# Patient Record
Sex: FEMALE | Race: BLACK OR AFRICAN AMERICAN | Employment: STUDENT | URBAN - METROPOLITAN AREA
[De-identification: names, ages, dates, MRNs, and addresses within clinical notes are randomized per-mention and may not be internally consistent; named-entity substitution may affect disease eponyms.]

---
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
## 2018-03-12 ENCOUNTER — HOSPITAL ENCOUNTER (EMERGENCY)
Facility: HOSPITAL | Age: 18
Discharge: HOME | End: 2018-03-12
Attending: EMERGENCY MEDICINE
Payer: COMMERCIAL

## 2018-03-12 VITALS
RESPIRATION RATE: 16 BRPM | TEMPERATURE: 99 F | SYSTOLIC BLOOD PRESSURE: 117 MMHG | DIASTOLIC BLOOD PRESSURE: 80 MMHG | OXYGEN SATURATION: 100 % | HEART RATE: 72 BPM

## 2018-03-12 DIAGNOSIS — L30.9 DERMATITIS: ICD-10-CM

## 2018-03-12 DIAGNOSIS — B35.4 RINGWORM OF BODY: Primary | ICD-10-CM

## 2018-03-12 PROCEDURE — 99283 EMERGENCY DEPT VISIT LOW MDM: CPT

## 2018-03-12 RX ORDER — CLOTRIMAZOLE 1 %
1 CREAM (GRAM) TOPICAL 2 TIMES DAILY
Qty: 1 TUBE | Refills: 0 | Status: SHIPPED | OUTPATIENT
Start: 2018-03-12 | End: 2019-07-02

## 2018-03-13 NOTE — ED PROVIDER NOTES
Patient Seen in: [facility] AND [facility] Emergency Department    History   Patient presents with:  Rash Skin Problem (integumentary)    Stated Complaint: rash    HPI    Patient presents with scattered rash she is noted on her arms one on her back as well as on shoulder over the scapula she has an area of what it looks to be ringworm. It is slightly raised flaky circular she had a bandage over it. I remove the bandage.   As far as the rash she is here with today she has a few scattered maculopapular areas on the

## 2018-03-13 NOTE — ED NOTES
Patient to ED from home for rash. Reviewed all discharge information and prescriptions with patient. Patient verbalized understanding, no further questions or complaints at this time.  Patient is alert and oriented x4, in no apparent distress, ambulating wi

## 2018-06-13 ENCOUNTER — HOSPITAL ENCOUNTER (EMERGENCY)
Facility: HOSPITAL | Age: 18
Discharge: HOME | End: 2018-06-13
Attending: EMERGENCY MEDICINE
Payer: COMMERCIAL

## 2018-06-13 VITALS
RESPIRATION RATE: 18 BRPM | HEART RATE: 76 BPM | OXYGEN SATURATION: 100 % | BODY MASS INDEX: 28.63 KG/M2 | WEIGHT: 200 LBS | SYSTOLIC BLOOD PRESSURE: 120 MMHG | DIASTOLIC BLOOD PRESSURE: 88 MMHG | TEMPERATURE: 97 F | HEIGHT: 70 IN

## 2018-06-13 DIAGNOSIS — N30.90 CYSTITIS: Primary | ICD-10-CM

## 2018-06-13 PROCEDURE — 87077 CULTURE AEROBIC IDENTIFY: CPT | Performed by: EMERGENCY MEDICINE

## 2018-06-13 PROCEDURE — 81001 URINALYSIS AUTO W/SCOPE: CPT | Performed by: EMERGENCY MEDICINE

## 2018-06-13 PROCEDURE — 87086 URINE CULTURE/COLONY COUNT: CPT | Performed by: EMERGENCY MEDICINE

## 2018-06-13 PROCEDURE — 99283 EMERGENCY DEPT VISIT LOW MDM: CPT

## 2018-06-13 PROCEDURE — 81025 URINE PREGNANCY TEST: CPT

## 2018-06-13 RX ORDER — NITROFURANTOIN 25; 75 MG/1; MG/1
100 CAPSULE ORAL 2 TIMES DAILY
Qty: 10 CAPSULE | Refills: 0 | Status: SHIPPED | OUTPATIENT
Start: 2018-06-13 | End: 2018-06-18

## 2018-06-13 NOTE — ED PROVIDER NOTES
Patient Seen in: [facility] AND [facility] Emergency Department    History   Patient presents with:  Urinary Symptoms (urologic): onset Monday    Stated Complaint: Painful urination    HPI  Patient is a 75-year-old female that complains of a few days of dysuria distension and no mass. There is no tenderness. There is no rebound and no guarding. Musculoskeletal: Normal range of motion. Exhibits no edema or tenderness. Lymphadenopathy: No cervical adenopathy.    Neurological: Alert and oriented to person, place,
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
## 2018-07-31 ENCOUNTER — HOSPITAL ENCOUNTER (EMERGENCY)
Facility: HOSPITAL | Age: 18
Discharge: HOME | End: 2018-07-31
Attending: PHYSICIAN ASSISTANT
Payer: COMMERCIAL

## 2018-07-31 VITALS
DIASTOLIC BLOOD PRESSURE: 69 MMHG | RESPIRATION RATE: 18 BRPM | BODY MASS INDEX: 33 KG/M2 | TEMPERATURE: 98 F | HEART RATE: 69 BPM | SYSTOLIC BLOOD PRESSURE: 121 MMHG | WEIGHT: 230 LBS | OXYGEN SATURATION: 98 %

## 2018-07-31 DIAGNOSIS — L30.9 ECZEMA OF BOTH HANDS: Primary | ICD-10-CM

## 2018-07-31 PROCEDURE — 99282 EMERGENCY DEPT VISIT SF MDM: CPT

## 2018-07-31 RX ORDER — DIPHENHYDRAMINE HCL 25 MG
50 CAPSULE ORAL ONCE
Status: COMPLETED | OUTPATIENT
Start: 2018-07-31 | End: 2018-07-31

## 2018-07-31 RX ORDER — DIPHENHYDRAMINE HCL 25 MG
CAPSULE ORAL EVERY 6 HOURS PRN
Qty: 40 CAPSULE | Refills: 0 | Status: SHIPPED | OUTPATIENT
Start: 2018-07-31 | End: 2018-08-05

## 2018-07-31 RX ORDER — HYDROCORTISONE VALERATE 2 MG/G
OINTMENT TOPICAL
Qty: 1 TUBE | Refills: 0 | Status: SHIPPED | OUTPATIENT
Start: 2018-07-31 | End: 2019-07-02

## 2018-07-31 NOTE — ED PROVIDER NOTES
Patient Seen in: [facility] AND [facility] Emergency Department    History   Patient presents with:  Rash Skin Problem (integumentary)    Stated Complaint: bumps on hand, itchy    HPI     Corrinne Nasuti is a 21year old female who presents with chief complaint o elements reviewed from today and agreed except as otherwise stated in HPI.     Physical Exam   ED Triage Vitals [07/31/18 1335]  BP: 121/69  Pulse: 69  Resp: 18  Temp: 97.9 °F (36.6 °C)  Temp src: Temporal  SpO2: 98 %  O2 Device: None (Room air)    Current: Physical exam remained stable over serial reexaminations as previously documented. Need for follow-up discussed with patient.             Disposition and Plan     Clinical Impression:  Eczema of both hands  (primary encounter diagnosis)    Dispositio

## 2018-07-31 NOTE — ED INITIAL ASSESSMENT (HPI)
Triage:  Patient noted itchy bumps to bilateral hands since Friday or Saturday. Has not tried anything for rash.

## 2018-09-01 ENCOUNTER — HOSPITAL ENCOUNTER (EMERGENCY)
Facility: HOSPITAL | Age: 18
Discharge: HOME | End: 2018-09-01
Attending: EMERGENCY MEDICINE
Payer: COMMERCIAL

## 2018-09-01 ENCOUNTER — APPOINTMENT (OUTPATIENT)
Dept: ULTRASOUND IMAGING | Facility: HOSPITAL | Age: 18
End: 2018-09-01
Attending: EMERGENCY MEDICINE
Payer: COMMERCIAL

## 2018-09-01 VITALS
OXYGEN SATURATION: 98 % | RESPIRATION RATE: 18 BRPM | HEART RATE: 54 BPM | HEIGHT: 70 IN | DIASTOLIC BLOOD PRESSURE: 76 MMHG | TEMPERATURE: 98 F | SYSTOLIC BLOOD PRESSURE: 103 MMHG

## 2018-09-01 DIAGNOSIS — M79.604 MUSCULOSKELETAL LEG PAIN, RIGHT: Primary | ICD-10-CM

## 2018-09-01 PROCEDURE — 93971 EXTREMITY STUDY: CPT | Performed by: EMERGENCY MEDICINE

## 2018-09-01 PROCEDURE — 99284 EMERGENCY DEPT VISIT MOD MDM: CPT

## 2018-09-01 NOTE — ED INITIAL ASSESSMENT (HPI)
Pt c/o right upper leg pain since Monday. Pt state she is unable to go to work d/t pain. Denies injury.

## 2018-09-01 NOTE — ED NOTES
Care assumed Alert and interactive Presents with several day hx nontraumatic RLE pain that radiates from hip to calf + distal pulses

## 2018-09-01 NOTE — ED PROVIDER NOTES
Patient Seen in: [facility] AND [facility] Emergency Department    History   Patient presents with:  Lower Extremity Injury (musculoskeletal)    Stated Complaint: right leg pain x 6 days.     HPI    The patient is a 12-year-old female who presents with 5 days of Abdominal: Soft. Bowel sounds are normal. She exhibits no mass. There is no tenderness. Musculoskeletal:        Right hip: She exhibits tenderness (Soft tissue see diagram). She exhibits normal range of motion, normal strength and no swelling.         Leg

## 2018-10-27 ENCOUNTER — HOSPITAL ENCOUNTER (EMERGENCY)
Facility: HOSPITAL | Age: 18
Discharge: HOME | End: 2018-10-27
Attending: EMERGENCY MEDICINE
Payer: COMMERCIAL

## 2018-10-27 VITALS
HEIGHT: 70 IN | BODY MASS INDEX: 31.5 KG/M2 | SYSTOLIC BLOOD PRESSURE: 121 MMHG | TEMPERATURE: 98 F | HEART RATE: 71 BPM | RESPIRATION RATE: 16 BRPM | WEIGHT: 220 LBS | DIASTOLIC BLOOD PRESSURE: 76 MMHG | OXYGEN SATURATION: 98 %

## 2018-10-27 DIAGNOSIS — N30.00 ACUTE CYSTITIS WITHOUT HEMATURIA: Primary | ICD-10-CM

## 2018-10-27 PROCEDURE — 81001 URINALYSIS AUTO W/SCOPE: CPT | Performed by: EMERGENCY MEDICINE

## 2018-10-27 PROCEDURE — 87186 SC STD MICRODIL/AGAR DIL: CPT | Performed by: EMERGENCY MEDICINE

## 2018-10-27 PROCEDURE — 87086 URINE CULTURE/COLONY COUNT: CPT | Performed by: EMERGENCY MEDICINE

## 2018-10-27 PROCEDURE — 99283 EMERGENCY DEPT VISIT LOW MDM: CPT

## 2018-10-27 PROCEDURE — 87088 URINE BACTERIA CULTURE: CPT | Performed by: EMERGENCY MEDICINE

## 2018-10-27 RX ORDER — NITROFURANTOIN 25; 75 MG/1; MG/1
100 CAPSULE ORAL 2 TIMES DAILY
Qty: 10 CAPSULE | Refills: 0 | Status: SHIPPED | OUTPATIENT
Start: 2018-10-27 | End: 2018-11-01

## 2018-10-27 NOTE — ED PROVIDER NOTES
Patient Seen in: [facility] Emergency Department    History   Patient presents with:  Dysuria    Stated Complaint: abdominal pain    HPI    23-year-old female with no significant past medical history presents emergency department for evaluation of Normal range of motion. No deformity. Lymphadenopathy: No sig cervical LAD   Neurological: Awake, alert. Normal reflexes. No cranial nerve deficit. Skin: Skin is warm and dry. No rash noted. No erythema.    Psychiatric:    ED Course     Labs Reviewed

## 2018-11-05 ENCOUNTER — APPOINTMENT (OUTPATIENT)
Dept: GENERAL RADIOLOGY | Facility: HOSPITAL | Age: 18
End: 2018-11-05
Attending: NURSE PRACTITIONER
Payer: COMMERCIAL

## 2018-11-05 ENCOUNTER — HOSPITAL ENCOUNTER (EMERGENCY)
Facility: HOSPITAL | Age: 18
Discharge: HOME | End: 2018-11-05
Payer: COMMERCIAL

## 2018-11-05 VITALS
HEART RATE: 72 BPM | TEMPERATURE: 99 F | SYSTOLIC BLOOD PRESSURE: 117 MMHG | OXYGEN SATURATION: 99 % | RESPIRATION RATE: 18 BRPM | DIASTOLIC BLOOD PRESSURE: 84 MMHG

## 2018-11-05 DIAGNOSIS — M79.671 FOOT PAIN, RIGHT: Primary | ICD-10-CM

## 2018-11-05 PROCEDURE — 73630 X-RAY EXAM OF FOOT: CPT | Performed by: NURSE PRACTITIONER

## 2018-11-05 PROCEDURE — 99283 EMERGENCY DEPT VISIT LOW MDM: CPT

## 2018-11-05 RX ORDER — IBUPROFEN 600 MG/1
600 TABLET ORAL EVERY 6 HOURS PRN
Qty: 20 TABLET | Refills: 0 | Status: SHIPPED | OUTPATIENT
Start: 2018-11-05 | End: 2018-11-12

## 2018-11-06 NOTE — ED NOTES
Pt provided with discharge instructions and prescriptions. Verbalized understanding for plan of care at home and follow up. All questions/concerns addressed prior to discharge. Pt wheeled out of er with family member.

## 2018-11-06 NOTE — ED PROVIDER NOTES
Patient Seen in: [facility] AND [facility] Emergency Department    History   CC: foot pain  HPI: Kim Jameson 19year old female  who presents to the ER c/o right sole of the foot pain since waking yesterday am. No known injury/trauma. No redness/wound/fever. General - Appears well, non-toxic and in NAD  Head - Appears symmetrical without deformity/swelling cranium, scalp, or facial bones  Skin - no rashes or petechiae noted, pink warm and dry throughout, mmm, no obvious signs of swelling/trauma/deformi swelling.    EFFUSION: None visible.    OTHER: Negative.                Results discussed with patient as well as general sprain/strain/contusion instructions. Patient is afebrile and without overlying warmth or erythema.   Will discharge home with  podiat

## 2019-01-08 ENCOUNTER — HOSPITAL ENCOUNTER (EMERGENCY)
Facility: HOSPITAL | Age: 19
Discharge: HOME | End: 2019-01-08
Payer: COMMERCIAL

## 2019-01-08 VITALS
DIASTOLIC BLOOD PRESSURE: 83 MMHG | HEIGHT: 70 IN | OXYGEN SATURATION: 99 % | RESPIRATION RATE: 15 BRPM | SYSTOLIC BLOOD PRESSURE: 128 MMHG | TEMPERATURE: 98 F | WEIGHT: 243.19 LBS | BODY MASS INDEX: 34.82 KG/M2 | HEART RATE: 73 BPM

## 2019-01-08 DIAGNOSIS — M72.2 PLANTAR FASCIITIS: Primary | ICD-10-CM

## 2019-01-08 PROCEDURE — 99282 EMERGENCY DEPT VISIT SF MDM: CPT

## 2019-01-08 RX ORDER — IBUPROFEN 600 MG/1
600 TABLET ORAL ONCE
Status: COMPLETED | OUTPATIENT
Start: 2019-01-08 | End: 2019-01-08

## 2019-01-09 NOTE — ED PROVIDER NOTES
Patient Seen in: [facility] Emergency Department    History   Patient presents with:  Musculoskeletal Problem    Stated Complaint: right foot pain    HPI    18-year-old female presents to the emergency department complaining of pain to the bottom time. She displays normal reflexes. No cranial nerve deficit. Skin: Skin is warm and dry. Capillary refill takes less than 2 seconds. No erythema. Nursing note and vitals reviewed.           ED Course   Labs Reviewed - No data to display             MDM

## 2019-01-09 NOTE — ED INITIAL ASSESSMENT (HPI)
Right foot pain 2 months ago, seen in ED and given post-op shoe. Patient presents wearing shoe saying that the pain is back.  Denies following up with ortho or podiatry

## 2019-03-04 ENCOUNTER — HOSPITAL ENCOUNTER (EMERGENCY)
Facility: HOSPITAL | Age: 19
Discharge: HOME | End: 2019-03-05
Attending: EMERGENCY MEDICINE
Payer: COMMERCIAL

## 2019-03-04 DIAGNOSIS — B34.9 VIRAL SYNDROME: Primary | ICD-10-CM

## 2019-03-04 LAB
BACTERIA UR QL AUTO: NEGATIVE /HPF
BILIRUB UR QL: NEGATIVE
CLARITY UR: CLEAR
COLOR UR: YELLOW
GLUCOSE UR-MCNC: NEGATIVE MG/DL
KETONES UR-MCNC: NEGATIVE MG/DL
LEUKOCYTE ESTERASE UR QL STRIP.AUTO: NEGATIVE
NITRITE UR QL STRIP.AUTO: NEGATIVE
PH UR: 5 [PH] (ref 5–8)
PROT UR-MCNC: NEGATIVE MG/DL
RBC #/AREA URNS AUTO: <1 /HPF
SP GR UR STRIP: 1.02 (ref 1–1.03)
UROBILINOGEN UR STRIP-ACNC: <2
VIT C UR-MCNC: NEGATIVE MG/DL
WBC #/AREA URNS AUTO: 1 /HPF

## 2019-03-04 PROCEDURE — 96374 THER/PROPH/DIAG INJ IV PUSH: CPT

## 2019-03-04 PROCEDURE — 81001 URINALYSIS AUTO W/SCOPE: CPT | Performed by: EMERGENCY MEDICINE

## 2019-03-04 PROCEDURE — 99284 EMERGENCY DEPT VISIT MOD MDM: CPT

## 2019-03-04 PROCEDURE — 96361 HYDRATE IV INFUSION ADD-ON: CPT

## 2019-03-04 RX ORDER — ONDANSETRON 2 MG/ML
4 INJECTION INTRAMUSCULAR; INTRAVENOUS ONCE
Status: COMPLETED | OUTPATIENT
Start: 2019-03-04 | End: 2019-03-05

## 2019-03-05 VITALS
DIASTOLIC BLOOD PRESSURE: 81 MMHG | OXYGEN SATURATION: 98 % | RESPIRATION RATE: 18 BRPM | SYSTOLIC BLOOD PRESSURE: 131 MMHG | WEIGHT: 246.94 LBS | BODY MASS INDEX: 35.35 KG/M2 | HEIGHT: 70 IN | TEMPERATURE: 99 F | HEART RATE: 87 BPM

## 2019-03-05 LAB
ANION GAP SERPL CALC-SCNC: 7 MMOL/L (ref 0–18)
BASOPHILS # BLD AUTO: 0.01 X10(3) UL (ref 0–0.2)
BASOPHILS NFR BLD AUTO: 0.2 %
BUN BLD-MCNC: 8 MG/DL (ref 7–18)
BUN/CREAT SERPL: 9.8 (ref 10–20)
CALCIUM BLD-MCNC: 8.3 MG/DL (ref 8.5–10.1)
CHLORIDE SERPL-SCNC: 107 MMOL/L (ref 98–107)
CO2 SERPL-SCNC: 22 MMOL/L (ref 21–32)
CREAT BLD-MCNC: 0.82 MG/DL (ref 0.55–1.02)
DEPRECATED RDW RBC AUTO: 44.2 FL (ref 35.1–46.3)
EOSINOPHIL # BLD AUTO: 0.06 X10(3) UL (ref 0–0.7)
EOSINOPHIL NFR BLD AUTO: 1 %
ERYTHROCYTE [DISTWIDTH] IN BLOOD BY AUTOMATED COUNT: 14.5 % (ref 11–15)
GLUCOSE BLD-MCNC: 92 MG/DL (ref 70–99)
HCT VFR BLD AUTO: 40.2 % (ref 35–48)
HGB BLD-MCNC: 12.7 G/DL (ref 12–16)
IMM GRANULOCYTES # BLD AUTO: 0.01 X10(3) UL (ref 0–1)
IMM GRANULOCYTES NFR BLD: 0.2 %
LYMPHOCYTES # BLD AUTO: 1.2 X10(3) UL (ref 1–4)
LYMPHOCYTES NFR BLD AUTO: 20.5 %
MCH RBC QN AUTO: 26.5 PG (ref 26–34)
MCHC RBC AUTO-ENTMCNC: 31.6 G/DL (ref 31–37)
MCV RBC AUTO: 83.9 FL (ref 80–100)
MONOCYTES # BLD AUTO: 0.32 X10(3) UL (ref 0.1–1)
MONOCYTES NFR BLD AUTO: 5.5 %
NEUTROPHILS # BLD AUTO: 4.26 X10 (3) UL (ref 1.5–7.7)
NEUTROPHILS # BLD AUTO: 4.26 X10(3) UL (ref 1.5–7.7)
NEUTROPHILS NFR BLD AUTO: 72.6 %
OSMOLALITY SERPL CALC.SUM OF ELEC: 280 MOSM/KG (ref 275–295)
PLATELET # BLD AUTO: 354 10(3)UL (ref 150–450)
POTASSIUM SERPL-SCNC: 3.4 MMOL/L (ref 3.5–5.1)
RBC # BLD AUTO: 4.79 X10(6)UL (ref 3.8–5.3)
SODIUM SERPL-SCNC: 136 MMOL/L (ref 136–145)
WBC # BLD AUTO: 5.9 X10(3) UL (ref 4–11)

## 2019-03-05 PROCEDURE — 80048 BASIC METABOLIC PNL TOTAL CA: CPT | Performed by: EMERGENCY MEDICINE

## 2019-03-05 PROCEDURE — 85025 COMPLETE CBC W/AUTO DIFF WBC: CPT | Performed by: EMERGENCY MEDICINE

## 2019-03-05 RX ORDER — ONDANSETRON 4 MG/1
4 TABLET, ORALLY DISINTEGRATING ORAL EVERY 6 HOURS PRN
Qty: 5 TABLET | Refills: 0 | Status: SHIPPED | OUTPATIENT
Start: 2019-03-05 | End: 2019-03-12

## 2019-03-05 NOTE — ED PROVIDER NOTES
Patient Seen in: [facility] AND [facility] Emergency Department    History   Patient presents with:  Fever (infectious)    Stated Complaint: fever    HPI    The patient is a 20-year-old female who presents with nausea abdominal pain diarrhea and subjective fever She exhibits no distension and no mass. There is no tenderness. Musculoskeletal: She exhibits no edema. Neurological: She is alert and oriented to person, place, and time. She has normal reflexes. Skin: Skin is warm and dry.  Capillary refill takes le am    Follow-up:  Jamshid Waters MD  [address] [place] [id number]  [phone]    Schedule an appointment as soon as possible for a visit in 2 days          Medications Prescribed:  Current Discharge Medication List    STA

## 2019-03-27 ENCOUNTER — HOSPITAL ENCOUNTER (EMERGENCY)
Facility: HOSPITAL | Age: 19
Discharge: HOME | End: 2019-03-27
Admitting: NURSE PRACTITIONER
Payer: COMMERCIAL

## 2019-03-27 VITALS
DIASTOLIC BLOOD PRESSURE: 82 MMHG | OXYGEN SATURATION: 98 % | RESPIRATION RATE: 18 BRPM | SYSTOLIC BLOOD PRESSURE: 124 MMHG | TEMPERATURE: 98 F | HEART RATE: 77 BPM

## 2019-03-27 DIAGNOSIS — R30.0 DYSURIA: Primary | ICD-10-CM

## 2019-03-27 LAB
BILIRUB UR QL: NEGATIVE
CLARITY UR: CLEAR
COLOR UR: YELLOW
GLUCOSE UR-MCNC: NEGATIVE MG/DL
HGB UR QL STRIP.AUTO: NEGATIVE
LEUKOCYTE ESTERASE UR QL STRIP.AUTO: NEGATIVE
NITRITE UR QL STRIP.AUTO: NEGATIVE
PH UR: 5 [PH] (ref 5–8)
PROT UR-MCNC: 30 MG/DL
RBC #/AREA URNS AUTO: 7 /HPF
SP GR UR STRIP: 1.03 (ref 1–1.03)
UROBILINOGEN UR STRIP-ACNC: <2
VIT C UR-MCNC: NEGATIVE MG/DL
WBC #/AREA URNS AUTO: 2 /HPF

## 2019-03-27 PROCEDURE — 81001 URINALYSIS AUTO W/SCOPE: CPT | Performed by: NURSE PRACTITIONER

## 2019-03-27 PROCEDURE — 99283 EMERGENCY DEPT VISIT LOW MDM: CPT

## 2019-03-27 RX ORDER — NITROFURANTOIN 25; 75 MG/1; MG/1
100 CAPSULE ORAL 2 TIMES DAILY
Qty: 20 CAPSULE | Refills: 0 | Status: SHIPPED | OUTPATIENT
Start: 2019-03-27 | End: 2019-04-06

## 2019-03-27 NOTE — ED INITIAL ASSESSMENT (HPI)
C/o lower abd pain for a few days, urge to go to bathroom, thinks its urine related because of urinary frequency. States it is intermittent pain. Denies vomiting. Denies fevers. Denies pain to back.

## 2019-03-27 NOTE — ED PROVIDER NOTES
Patient Seen in: [facility] AND [facility] Emergency Department    History   Patient presents with:  Abdomen/Flank Pain (GI/)    Stated Complaint: ABD pain/pressure     HPI    64-year-old female to the emergency department with complaints of lower abdominal pr (Room air)       Current:/82   Pulse 77   Temp 98.3 °F (36.8 °C) (Oral)   Resp 18   LMP 03/13/2019   SpO2 98%   Pulse ox 98% on RA      Physical Exam  General Appearance: alert, no distress  Eyes: pupils equal and round no pallor or injection  ENT, M

## 2019-03-27 NOTE — ED NOTES
Received pt from triage. Pt here with c/o low abd pain and painful urination. Pt states symptoms began a couple days ago. States she drank [place] ton\" of water and symptoms improved. Abd soft, no tender.  Resting comfortably on cart, urine specimen obtained a

## 2019-04-18 ENCOUNTER — HOSPITAL ENCOUNTER (EMERGENCY)
Facility: HOSPITAL | Age: 19
Discharge: HOME | End: 2019-04-18
Attending: EMERGENCY MEDICINE
Payer: COMMERCIAL

## 2019-04-18 VITALS
HEART RATE: 71 BPM | OXYGEN SATURATION: 99 % | TEMPERATURE: 97 F | SYSTOLIC BLOOD PRESSURE: 122 MMHG | DIASTOLIC BLOOD PRESSURE: 71 MMHG | HEIGHT: 70 IN | BODY MASS INDEX: 35.35 KG/M2 | WEIGHT: 246.94 LBS | RESPIRATION RATE: 18 BRPM

## 2019-04-18 DIAGNOSIS — N30.90 CYSTITIS: Primary | ICD-10-CM

## 2019-04-18 PROCEDURE — 87086 URINE CULTURE/COLONY COUNT: CPT | Performed by: EMERGENCY MEDICINE

## 2019-04-18 PROCEDURE — 81001 URINALYSIS AUTO W/SCOPE: CPT | Performed by: EMERGENCY MEDICINE

## 2019-04-18 PROCEDURE — 81025 URINE PREGNANCY TEST: CPT

## 2019-04-18 PROCEDURE — 99283 EMERGENCY DEPT VISIT LOW MDM: CPT

## 2019-04-18 RX ORDER — PHENAZOPYRIDINE HYDROCHLORIDE 100 MG/1
100 TABLET, FILM COATED ORAL 3 TIMES DAILY PRN
Qty: 6 TABLET | Refills: 0 | Status: SHIPPED | OUTPATIENT
Start: 2019-04-18 | End: 2019-04-25

## 2019-04-18 RX ORDER — NITROFURANTOIN 25; 75 MG/1; MG/1
100 CAPSULE ORAL 2 TIMES DAILY
Qty: 10 CAPSULE | Refills: 0 | Status: SHIPPED | OUTPATIENT
Start: 2019-04-18 | End: 2019-04-23

## 2019-04-18 NOTE — ED PROVIDER NOTES
Patient Seen in: [facility] AND [facility] Emergency Department    History   Patient presents with:  Urinary Symptoms (urologic)    Stated Complaint:     HPI    Patient complains of urinary frequency, urgency and dysuria that began 2 days ago.   Patient denies atraumatic, normocephalic, ears and throat are clear  EYES: sclera non icteric, conjunctiva non-injected  NECK: supple,  LUNGS:no resp distress  CARDIO:regular rate  EXTREMITIES: no cyanosis, clubbing or edema  BACK: no CVA tenderness  GI: soft, mild supra (two) times daily for 5 days. , Print Script, Disp-10 capsule, R-0

## 2019-05-23 ENCOUNTER — HOSPITAL ENCOUNTER (EMERGENCY)
Facility: HOSPITAL | Age: 19
Discharge: HOME | End: 2019-05-23
Attending: EMERGENCY MEDICINE
Payer: COMMERCIAL

## 2019-05-23 VITALS
SYSTOLIC BLOOD PRESSURE: 110 MMHG | TEMPERATURE: 98 F | HEIGHT: 70 IN | DIASTOLIC BLOOD PRESSURE: 66 MMHG | BODY MASS INDEX: 32.93 KG/M2 | RESPIRATION RATE: 17 BRPM | WEIGHT: 230 LBS | OXYGEN SATURATION: 98 % | HEART RATE: 72 BPM

## 2019-05-23 DIAGNOSIS — N30.00 ACUTE CYSTITIS WITHOUT HEMATURIA: Primary | ICD-10-CM

## 2019-05-23 PROCEDURE — 81025 URINE PREGNANCY TEST: CPT

## 2019-05-23 PROCEDURE — 81001 URINALYSIS AUTO W/SCOPE: CPT | Performed by: EMERGENCY MEDICINE

## 2019-05-23 PROCEDURE — 87086 URINE CULTURE/COLONY COUNT: CPT | Performed by: EMERGENCY MEDICINE

## 2019-05-23 PROCEDURE — 99283 EMERGENCY DEPT VISIT LOW MDM: CPT

## 2019-05-23 RX ORDER — CEPHALEXIN 500 MG/1
500 CAPSULE ORAL 2 TIMES DAILY
Qty: 10 CAPSULE | Refills: 0 | Status: SHIPPED | OUTPATIENT
Start: 2019-05-23 | End: 2019-05-28

## 2019-05-23 NOTE — ED PROVIDER NOTES
Patient Seen in: [facility] Emergency Department    History   Patient presents with:  Urinary Symptoms (urologic)    Stated Complaint: painful urination    HPI    25-year-old female with history of recurrent UTI presents to the emergency departmen Back:   : Musculoskeletal: Normal range of motion. No deformity. Lymphadenopathy: No sig cervical LAD   Neurological: Awake, alert. Normal reflexes. No cranial nerve deficit. Skin: Skin is warm and dry. No rash noted. No erythema.    Psychiatric:

## 2019-05-23 NOTE — ED INITIAL ASSESSMENT (HPI)
Pt came in for dysuria for 3 days. Hx of UTIs. RR even and nonlabored, speaking in full sentences, ambulatory with steady gait.

## 2019-06-27 ENCOUNTER — HOSPITAL ENCOUNTER (EMERGENCY)
Facility: HOSPITAL | Age: 19
Discharge: HOME | End: 2019-06-27
Attending: PHYSICIAN ASSISTANT
Payer: COMMERCIAL

## 2019-06-27 VITALS
HEIGHT: 70 IN | WEIGHT: 244.94 LBS | DIASTOLIC BLOOD PRESSURE: 71 MMHG | OXYGEN SATURATION: 99 % | HEART RATE: 64 BPM | TEMPERATURE: 98 F | RESPIRATION RATE: 14 BRPM | BODY MASS INDEX: 35.07 KG/M2 | SYSTOLIC BLOOD PRESSURE: 118 MMHG

## 2019-06-27 DIAGNOSIS — N93.9 ABNORMAL UTERINE BLEEDING: Primary | ICD-10-CM

## 2019-06-27 PROCEDURE — 96360 HYDRATION IV INFUSION INIT: CPT

## 2019-06-27 PROCEDURE — 80048 BASIC METABOLIC PNL TOTAL CA: CPT | Performed by: PHYSICIAN ASSISTANT

## 2019-06-27 PROCEDURE — 85025 COMPLETE CBC W/AUTO DIFF WBC: CPT | Performed by: PHYSICIAN ASSISTANT

## 2019-06-27 PROCEDURE — 81001 URINALYSIS AUTO W/SCOPE: CPT | Performed by: PHYSICIAN ASSISTANT

## 2019-06-27 PROCEDURE — 99284 EMERGENCY DEPT VISIT MOD MDM: CPT

## 2019-06-27 PROCEDURE — 81025 URINE PREGNANCY TEST: CPT

## 2019-06-27 RX ORDER — NAPROXEN 500 MG/1
500 TABLET ORAL 2 TIMES DAILY PRN
Qty: 20 TABLET | Refills: 0 | Status: SHIPPED | OUTPATIENT
Start: 2019-06-27 | End: 2019-07-02

## 2019-06-27 NOTE — ED PROVIDER NOTES
Patient Seen in: [facility] Emergency Department    History   Patient presents with:  Eval-G (genital)    Stated Complaint:     HPI    Saadia Arzola is a 19year old female who presents with chief complaint of vaginal bleeding.   Onset 1 month ago Current:/71   Pulse 64   Temp 97.6 °F (36.4 °C) (Temporal)   Resp 14   Ht 177.8 cm (5' 10\")   Wt 111.1 kg   LMP  (LMP Unknown)   SpO2 99%   BMI 35.14 kg/m²     PULSE OX within normal limits on room air as interpreted by this provider.         P RBC URINE 823 (*)     All other components within normal limits   CBC W/ DIFFERENTIAL - Abnormal; Notable for the following components:    RDW-SD 46.4 (*)     All other components within normal limits   BASIC METABOLIC PANEL (8) - Normal   [facility] POCT PREGNAN

## 2019-12-24 ENCOUNTER — HOSPITAL ENCOUNTER (EMERGENCY)
Facility: HOSPITAL | Age: 19
Discharge: HOME | End: 2019-12-24
Attending: PHYSICIAN ASSISTANT
Payer: COMMERCIAL

## 2019-12-24 VITALS
OXYGEN SATURATION: 95 % | HEART RATE: 90 BPM | DIASTOLIC BLOOD PRESSURE: 71 MMHG | SYSTOLIC BLOOD PRESSURE: 115 MMHG | TEMPERATURE: 98 F | RESPIRATION RATE: 18 BRPM

## 2019-12-24 DIAGNOSIS — N76.0 BACTERIAL VAGINOSIS: ICD-10-CM

## 2019-12-24 DIAGNOSIS — R31.9 URINARY TRACT INFECTION WITH HEMATURIA, SITE UNSPECIFIED: Primary | ICD-10-CM

## 2019-12-24 DIAGNOSIS — B96.89 BACTERIAL VAGINOSIS: ICD-10-CM

## 2019-12-24 DIAGNOSIS — Z20.2 POSSIBLE EXPOSURE TO STD: ICD-10-CM

## 2019-12-24 DIAGNOSIS — N39.0 URINARY TRACT INFECTION WITH HEMATURIA, SITE UNSPECIFIED: Primary | ICD-10-CM

## 2019-12-24 PROCEDURE — 87808 TRICHOMONAS ASSAY W/OPTIC: CPT | Performed by: PHYSICIAN ASSISTANT

## 2019-12-24 PROCEDURE — 87106 FUNGI IDENTIFICATION YEAST: CPT | Performed by: PHYSICIAN ASSISTANT

## 2019-12-24 PROCEDURE — 81001 URINALYSIS AUTO W/SCOPE: CPT | Performed by: PHYSICIAN ASSISTANT

## 2019-12-24 PROCEDURE — 99284 EMERGENCY DEPT VISIT MOD MDM: CPT

## 2019-12-24 PROCEDURE — 87086 URINE CULTURE/COLONY COUNT: CPT | Performed by: PHYSICIAN ASSISTANT

## 2019-12-24 PROCEDURE — 96372 THER/PROPH/DIAG INJ SC/IM: CPT

## 2019-12-24 PROCEDURE — 87205 SMEAR GRAM STAIN: CPT | Performed by: PHYSICIAN ASSISTANT

## 2019-12-24 PROCEDURE — 81025 URINE PREGNANCY TEST: CPT

## 2019-12-24 PROCEDURE — 87591 N.GONORRHOEAE DNA AMP PROB: CPT | Performed by: PHYSICIAN ASSISTANT

## 2019-12-24 PROCEDURE — 87491 CHLMYD TRACH DNA AMP PROBE: CPT | Performed by: PHYSICIAN ASSISTANT

## 2019-12-24 RX ORDER — AZITHROMYCIN 250 MG/1
1000 TABLET, FILM COATED ORAL ONCE
Status: COMPLETED | OUTPATIENT
Start: 2019-12-24 | End: 2019-12-24

## 2019-12-24 RX ORDER — CEPHALEXIN 500 MG/1
500 CAPSULE ORAL 3 TIMES DAILY
Qty: 21 CAPSULE | Refills: 0 | Status: SHIPPED | OUTPATIENT
Start: 2019-12-24 | End: 2019-12-31

## 2019-12-24 RX ORDER — METRONIDAZOLE 500 MG/1
500 TABLET ORAL 2 TIMES DAILY
Qty: 14 TABLET | Refills: 0 | Status: SHIPPED | OUTPATIENT
Start: 2019-12-24 | End: 2019-12-31

## 2019-12-25 NOTE — ED PROVIDER NOTES
Patient Seen in: [facility] AND [facility] Emergency Department    History   Patient presents with:  Urinary Symptoms    Stated Complaint: uti symptoms    HPI    Aditi Ackerman is a 22year old female who presents with chief complaint of dysuria.   Onset 3 days a within normal limits on room air as interpreted by this provider. Physical Exam    Constitutional: The patient is cooperative. Appears well-developed and well-nourished. No acute distress. Psychological: Alert, No abnormalities of mood, affect.   H normal limits   [facility] POCT PREGNANCY URINE - Normal   CHLAMYDIA/GONOCOCCUS, THANIA   GENITAL VAGINOSIS SCREEN   URINE CULTURE, ROUTINE       MDM       Physical exam remained stable over serial reexaminations as previously documented.   Results reviewed and need

## 2020-01-05 ENCOUNTER — HOSPITAL ENCOUNTER (EMERGENCY)
Facility: HOSPITAL | Age: 20
Discharge: HOME | End: 2020-01-05
Attending: EMERGENCY MEDICINE
Payer: MEDICAID

## 2020-01-05 VITALS
HEART RATE: 63 BPM | SYSTOLIC BLOOD PRESSURE: 124 MMHG | DIASTOLIC BLOOD PRESSURE: 75 MMHG | BODY MASS INDEX: 33.07 KG/M2 | HEIGHT: 70 IN | OXYGEN SATURATION: 98 % | TEMPERATURE: 98 F | RESPIRATION RATE: 20 BRPM | WEIGHT: 231 LBS

## 2020-01-05 DIAGNOSIS — N76.0 ACUTE VAGINITIS: ICD-10-CM

## 2020-01-05 DIAGNOSIS — A54.9 GONORRHEA: Primary | ICD-10-CM

## 2020-01-05 LAB
B-HCG UR QL: NEGATIVE
BILIRUB UR QL: NEGATIVE
COLOR UR: YELLOW
GLUCOSE UR-MCNC: NEGATIVE MG/DL
HGB UR QL STRIP.AUTO: NEGATIVE
NITRITE UR QL STRIP.AUTO: NEGATIVE
PH UR: 7 [PH] (ref 5–8)
PROT UR-MCNC: NEGATIVE MG/DL
RBC #/AREA URNS AUTO: 2 /HPF
SP GR UR STRIP: 1.02 (ref 1–1.03)
UROBILINOGEN UR STRIP-ACNC: 4
WBC #/AREA URNS AUTO: 15 /HPF

## 2020-01-05 PROCEDURE — 99283 EMERGENCY DEPT VISIT LOW MDM: CPT

## 2020-01-05 PROCEDURE — 87186 SC STD MICRODIL/AGAR DIL: CPT | Performed by: EMERGENCY MEDICINE

## 2020-01-05 PROCEDURE — 87077 CULTURE AEROBIC IDENTIFY: CPT | Performed by: EMERGENCY MEDICINE

## 2020-01-05 PROCEDURE — 81001 URINALYSIS AUTO W/SCOPE: CPT | Performed by: EMERGENCY MEDICINE

## 2020-01-05 PROCEDURE — 81025 URINE PREGNANCY TEST: CPT

## 2020-01-05 PROCEDURE — 87086 URINE CULTURE/COLONY COUNT: CPT | Performed by: EMERGENCY MEDICINE

## 2020-01-05 RX ORDER — DOXYCYCLINE HYCLATE 100 MG/1
100 CAPSULE ORAL 2 TIMES DAILY
Qty: 14 CAPSULE | Refills: 0 | Status: SHIPPED | OUTPATIENT
Start: 2020-01-05 | End: 2020-01-12

## 2020-01-06 NOTE — ED PROVIDER NOTES
Patient Seen in: [facility] AND [facility] Emergency Department      History   Patient presents with:  Juan    Stated Complaint: \"bacterial vaginosis\"    HPI    25-year-old female presents the ER with complaint of vaginal discharge.   Patient was seen on Dece Pupils: Pupils are equal, round, and reactive to light. Neck:      Musculoskeletal: Normal range of motion and neck supple. Cardiovascular:      Rate and Rhythm: Normal rate and regular rhythm. Pulses: Normal pulses.       Heart sounds: Normal hear Cap  Take 1 capsule (100 mg total) by mouth 2 (two) times daily for 7 days. Qty: 14 capsule Refills: 0    !! Doxycycline Hyclate 100 MG Oral Cap  Take 1 capsule (100 mg total) by mouth 2 (two) times daily for 7 days.   Qty: 14 capsule Refills: 0    !! - Po

## 2020-01-06 NOTE — ED INITIAL ASSESSMENT (HPI)
Pt was seen in ER last week and was dx with bacterial vaginosis. Treated with cephalexin and meloxicam. Completed treatment but still having same symptoms. +discharge and itching. Denies fevers. No OTC medications taken pta.

## 2020-01-06 NOTE — ED NOTES
Pt took the ANBX though continued to have same symptoms: whitish vaginal discharge & itchiness. Pt denied having intercourse since her diagnosis. Pt denied fevers or urinary symptoms.

## 2020-01-08 NOTE — ED NOTES
Called in Cipro script per Dr. Mike Rodriguez orders to Lindsay at [facility], informed pt of results and needed rx.

## 2020-01-21 ENCOUNTER — HOSPITAL ENCOUNTER (EMERGENCY)
Facility: HOSPITAL | Age: 20
Discharge: HOME | End: 2020-01-21
Attending: PHYSICIAN ASSISTANT
Payer: MEDICAID

## 2020-01-21 VITALS
BODY MASS INDEX: 34.09 KG/M2 | DIASTOLIC BLOOD PRESSURE: 58 MMHG | SYSTOLIC BLOOD PRESSURE: 119 MMHG | TEMPERATURE: 98 F | RESPIRATION RATE: 18 BRPM | HEIGHT: 70 IN | OXYGEN SATURATION: 100 % | WEIGHT: 238.13 LBS | HEART RATE: 74 BPM

## 2020-01-21 DIAGNOSIS — R11.0 NAUSEA: Primary | ICD-10-CM

## 2020-01-21 DIAGNOSIS — R10.9 ABDOMINAL PAIN, ACUTE: ICD-10-CM

## 2020-01-21 LAB
ALBUMIN SERPL-MCNC: 3.4 G/DL (ref 3.4–5)
ALP LIVER SERPL-CCNC: 47 U/L (ref 52–144)
ALT SERPL-CCNC: 15 U/L (ref 13–56)
ANION GAP SERPL CALC-SCNC: 6 MMOL/L (ref 0–18)
AST SERPL-CCNC: 13 U/L (ref 15–37)
B-HCG UR QL: NEGATIVE
BASOPHILS # BLD AUTO: 0.02 X10(3) UL (ref 0–0.2)
BASOPHILS NFR BLD AUTO: 0.4 %
BILIRUB DIRECT SERPL-MCNC: <0.1 MG/DL (ref 0–0.2)
BILIRUB SERPL-MCNC: 0.3 MG/DL (ref 0.1–2)
BILIRUB UR QL: NEGATIVE
BUN BLD-MCNC: 9 MG/DL (ref 7–18)
BUN/CREAT SERPL: 9.9 (ref 10–20)
CALCIUM BLD-MCNC: 8.5 MG/DL (ref 8.5–10.1)
CHLORIDE SERPL-SCNC: 106 MMOL/L (ref 98–112)
CLARITY UR: CLEAR
CO2 SERPL-SCNC: 26 MMOL/L (ref 21–32)
COLOR UR: YELLOW
CREAT BLD-MCNC: 0.91 MG/DL (ref 0.55–1.02)
DEPRECATED RDW RBC AUTO: 48.6 FL (ref 35.1–46.3)
EOSINOPHIL # BLD AUTO: 0.11 X10(3) UL (ref 0–0.7)
EOSINOPHIL NFR BLD AUTO: 2.2 %
ERYTHROCYTE [DISTWIDTH] IN BLOOD BY AUTOMATED COUNT: 15.7 % (ref 11–15)
GLUCOSE BLD-MCNC: 85 MG/DL (ref 70–99)
GLUCOSE UR-MCNC: NEGATIVE MG/DL
HCT VFR BLD AUTO: 36.2 % (ref 35–48)
HGB BLD-MCNC: 11.5 G/DL (ref 12–16)
HGB UR QL STRIP.AUTO: NEGATIVE
IMM GRANULOCYTES # BLD AUTO: 0.01 X10(3) UL (ref 0–1)
IMM GRANULOCYTES NFR BLD: 0.2 %
KETONES UR-MCNC: NEGATIVE MG/DL
LEUKOCYTE ESTERASE UR QL STRIP.AUTO: NEGATIVE
LIPASE SERPL-CCNC: 113 U/L (ref 73–393)
LYMPHOCYTES # BLD AUTO: 2.04 X10(3) UL (ref 1–4)
LYMPHOCYTES NFR BLD AUTO: 40.6 %
M PROTEIN MFR SERPL ELPH: 7.9 G/DL (ref 6.4–8.2)
MCH RBC QN AUTO: 26.9 PG (ref 26–34)
MCHC RBC AUTO-ENTMCNC: 31.8 G/DL (ref 31–37)
MCV RBC AUTO: 84.6 FL (ref 80–100)
MONOCYTES # BLD AUTO: 0.28 X10(3) UL (ref 0.1–1)
MONOCYTES NFR BLD AUTO: 5.6 %
NEUTROPHILS # BLD AUTO: 2.57 X10 (3) UL (ref 1.5–7.7)
NEUTROPHILS # BLD AUTO: 2.57 X10(3) UL (ref 1.5–7.7)
NEUTROPHILS NFR BLD AUTO: 51 %
NITRITE UR QL STRIP.AUTO: NEGATIVE
OSMOLALITY SERPL CALC.SUM OF ELEC: 284 MOSM/KG (ref 275–295)
PH UR: 6 [PH] (ref 5–8)
PLATELET # BLD AUTO: 304 10(3)UL (ref 150–450)
POTASSIUM SERPL-SCNC: 3.6 MMOL/L (ref 3.5–5.1)
PROT UR-MCNC: NEGATIVE MG/DL
RBC # BLD AUTO: 4.28 X10(6)UL (ref 3.8–5.3)
SODIUM SERPL-SCNC: 138 MMOL/L (ref 136–145)
SP GR UR STRIP: 1.02 (ref 1–1.03)
UROBILINOGEN UR STRIP-ACNC: 4
WBC # BLD AUTO: 5 X10(3) UL (ref 4–11)

## 2020-01-21 PROCEDURE — 87808 TRICHOMONAS ASSAY W/OPTIC: CPT | Performed by: PHYSICIAN ASSISTANT

## 2020-01-21 PROCEDURE — 99284 EMERGENCY DEPT VISIT MOD MDM: CPT

## 2020-01-21 PROCEDURE — S0028 INJECTION, FAMOTIDINE, 20 MG: HCPCS | Performed by: PHYSICIAN ASSISTANT

## 2020-01-21 PROCEDURE — 80048 BASIC METABOLIC PNL TOTAL CA: CPT | Performed by: PHYSICIAN ASSISTANT

## 2020-01-21 PROCEDURE — 83690 ASSAY OF LIPASE: CPT | Performed by: PHYSICIAN ASSISTANT

## 2020-01-21 PROCEDURE — 96374 THER/PROPH/DIAG INJ IV PUSH: CPT

## 2020-01-21 PROCEDURE — 85025 COMPLETE CBC W/AUTO DIFF WBC: CPT | Performed by: PHYSICIAN ASSISTANT

## 2020-01-21 PROCEDURE — 80076 HEPATIC FUNCTION PANEL: CPT | Performed by: PHYSICIAN ASSISTANT

## 2020-01-21 PROCEDURE — 87591 N.GONORRHOEAE DNA AMP PROB: CPT | Performed by: PHYSICIAN ASSISTANT

## 2020-01-21 PROCEDURE — 81003 URINALYSIS AUTO W/O SCOPE: CPT | Performed by: PHYSICIAN ASSISTANT

## 2020-01-21 PROCEDURE — 87205 SMEAR GRAM STAIN: CPT | Performed by: PHYSICIAN ASSISTANT

## 2020-01-21 PROCEDURE — 87491 CHLMYD TRACH DNA AMP PROBE: CPT | Performed by: PHYSICIAN ASSISTANT

## 2020-01-21 PROCEDURE — 81025 URINE PREGNANCY TEST: CPT

## 2020-01-21 PROCEDURE — 96375 TX/PRO/DX INJ NEW DRUG ADDON: CPT

## 2020-01-21 PROCEDURE — 87106 FUNGI IDENTIFICATION YEAST: CPT | Performed by: PHYSICIAN ASSISTANT

## 2020-01-21 PROCEDURE — 96361 HYDRATE IV INFUSION ADD-ON: CPT

## 2020-01-21 RX ORDER — ONDANSETRON 4 MG/1
4 TABLET, ORALLY DISINTEGRATING ORAL EVERY 6 HOURS PRN
Qty: 10 TABLET | Refills: 0 | Status: SHIPPED | OUTPATIENT
Start: 2020-01-21 | End: 2020-01-24

## 2020-01-21 RX ORDER — FAMOTIDINE 10 MG/ML
20 INJECTION, SOLUTION INTRAVENOUS ONCE
Status: COMPLETED | OUTPATIENT
Start: 2020-01-21 | End: 2020-01-21

## 2020-01-21 RX ORDER — ONDANSETRON 2 MG/ML
4 INJECTION INTRAMUSCULAR; INTRAVENOUS ONCE
Status: COMPLETED | OUTPATIENT
Start: 2020-01-21 | End: 2020-01-21

## 2020-01-21 RX ORDER — FAMOTIDINE 20 MG/1
20 TABLET ORAL 2 TIMES DAILY
Qty: 28 TABLET | Refills: 0 | Status: SHIPPED | OUTPATIENT
Start: 2020-01-21 | End: 2020-02-04

## 2020-01-21 NOTE — ED PROVIDER NOTES
Patient Seen in: [facility] AND [facility] Emergency Department    History   Patient presents with:  Abdomen/Flank Pain    Stated Complaint: Nausea, abd pain    HPI    Mason Del Real is a 22year old female who presents with chief complaint of epigastric abdomin PULSE OX within normal limits on room air as interpreted by this provider. Physical Exam    Constitutional: The patient is cooperative. Appears well-developed and well-nourished. No acute distress.   Psychological: Alert, No abnormalities of moo FUNCTION PANEL (7) - Abnormal; Notable for the following components:    AST 13 (*)     Alkaline Phosphatase 47 (*)     All other components within normal limits   CBC W/ DIFFERENTIAL - Abnormal; Notable for the following components:    HGB 11.5 (*)     RDW Medication List    START taking these medications    famoTIDine (PEPCID) 20 MG Oral Tab  Take 1 tablet (20 mg total) by mouth 2 (two) times daily for 14 days.   Qty: 28 tablet Refills: 0    ondansetron 4 MG Oral Tablet Dispersible  Take 1 tablet (4 mg total

## 2020-01-22 ENCOUNTER — TELEPHONE (OUTPATIENT)
Dept: INTERNAL MEDICINE CLINIC | Facility: CLINIC | Age: 20
End: 2020-01-22

## 2020-01-22 LAB
C TRACH DNA SPEC QL NAA+PROBE: NEGATIVE
GENITAL VAGINOSIS SCREEN: NEGATIVE
N GONORRHOEA DNA SPEC QL NAA+PROBE: NEGATIVE
TRICHOMONAS SCREEN: NEGATIVE

## 2020-02-05 ENCOUNTER — HOSPITAL ENCOUNTER (EMERGENCY)
Facility: HOSPITAL | Age: 20
Discharge: HOME | End: 2020-02-06
Attending: EMERGENCY MEDICINE
Payer: MEDICAID

## 2020-02-05 DIAGNOSIS — N30.00 ACUTE CYSTITIS WITHOUT HEMATURIA: Primary | ICD-10-CM

## 2020-02-05 LAB
BILIRUB UR QL: NEGATIVE
COLOR UR: YELLOW
GLUCOSE UR-MCNC: NEGATIVE MG/DL
KETONES UR-MCNC: NEGATIVE MG/DL
NITRITE UR QL STRIP.AUTO: NEGATIVE
PH UR: 7 [PH] (ref 5–8)
PROT UR-MCNC: NEGATIVE MG/DL
RBC #/AREA URNS AUTO: 6 /HPF
SP GR UR STRIP: 1.02 (ref 1–1.03)
UROBILINOGEN UR STRIP-ACNC: <2
WBC #/AREA URNS AUTO: 10 /HPF

## 2020-02-05 PROCEDURE — 87591 N.GONORRHOEAE DNA AMP PROB: CPT | Performed by: EMERGENCY MEDICINE

## 2020-02-05 PROCEDURE — 87086 URINE CULTURE/COLONY COUNT: CPT | Performed by: EMERGENCY MEDICINE

## 2020-02-05 PROCEDURE — 87808 TRICHOMONAS ASSAY W/OPTIC: CPT | Performed by: EMERGENCY MEDICINE

## 2020-02-05 PROCEDURE — 87106 FUNGI IDENTIFICATION YEAST: CPT | Performed by: EMERGENCY MEDICINE

## 2020-02-05 PROCEDURE — 87205 SMEAR GRAM STAIN: CPT | Performed by: EMERGENCY MEDICINE

## 2020-02-05 PROCEDURE — 81001 URINALYSIS AUTO W/SCOPE: CPT | Performed by: EMERGENCY MEDICINE

## 2020-02-05 PROCEDURE — 99283 EMERGENCY DEPT VISIT LOW MDM: CPT

## 2020-02-05 PROCEDURE — 87491 CHLMYD TRACH DNA AMP PROBE: CPT | Performed by: EMERGENCY MEDICINE

## 2020-02-06 VITALS
OXYGEN SATURATION: 99 % | SYSTOLIC BLOOD PRESSURE: 106 MMHG | HEART RATE: 53 BPM | TEMPERATURE: 98 F | DIASTOLIC BLOOD PRESSURE: 59 MMHG | RESPIRATION RATE: 18 BRPM

## 2020-02-06 LAB
B-HCG UR QL: NEGATIVE
C TRACH DNA SPEC QL NAA+PROBE: NEGATIVE
N GONORRHOEA DNA SPEC QL NAA+PROBE: NEGATIVE

## 2020-02-06 PROCEDURE — 81025 URINE PREGNANCY TEST: CPT

## 2020-02-06 RX ORDER — METRONIDAZOLE 500 MG/1
500 TABLET ORAL 2 TIMES DAILY
Qty: 14 TABLET | Refills: 0 | Status: SHIPPED | OUTPATIENT
Start: 2020-02-06 | End: 2020-02-13

## 2020-02-06 RX ORDER — CEPHALEXIN 500 MG/1
500 CAPSULE ORAL 2 TIMES DAILY
Qty: 10 CAPSULE | Refills: 0 | Status: SHIPPED | OUTPATIENT
Start: 2020-02-06 | End: 2020-02-11

## 2020-02-06 RX ORDER — FLUCONAZOLE 150 MG/1
150 TABLET ORAL ONCE
Qty: 1 TABLET | Refills: 0 | Status: SHIPPED | OUTPATIENT
Start: 2020-02-06 | End: 2020-02-06

## 2020-02-06 NOTE — ED INITIAL ASSESSMENT (HPI)
The patient reports 2 days of vaginal itching. The patient also complains of right anterior lower to upper arm pain that she attributes to getting an intravenous catheter here on 1/21/2020.

## 2020-02-06 NOTE — ED PROVIDER NOTES
Patient Seen in: [facility] AND [facility] Emergency Department      History   Patient presents with:  Yeast Infection  Pain    Stated Complaint: vaginal itching and right arm pain    HPI    68-year-old female with no significant past medical history presents to tracheal deviation present. CV: s1s2+, RRR, no m/r/g, normal distal pulses  Pulmonary/Chest: CTA b/l with no rales, wheezes. No chest wall tenderness  Abdominal: Nontender. Nondistended. Soft.  Bowel sounds are normal.   Back:   : Yellowish discharge 0    fluconazole 150 MG Oral Tab  Take 1 tablet (150 mg total) by mouth once for 1 dose.   Qty: 1 tablet Refills: 0

## 2020-02-08 LAB
GENITAL VAGINOSIS SCREEN: NEGATIVE
TRICHOMONAS SCREEN: NEGATIVE

## 2020-04-01 PROCEDURE — 36415 COLL VENOUS BLD VENIPUNCTURE: CPT

## 2020-04-01 PROCEDURE — 99283 EMERGENCY DEPT VISIT LOW MDM: CPT

## 2020-04-02 ENCOUNTER — HOSPITAL ENCOUNTER (EMERGENCY)
Facility: HOSPITAL | Age: 20
Discharge: HOME | End: 2020-04-02
Attending: EMERGENCY MEDICINE
Payer: MEDICAID

## 2020-04-02 VITALS
WEIGHT: 235.69 LBS | SYSTOLIC BLOOD PRESSURE: 112 MMHG | DIASTOLIC BLOOD PRESSURE: 80 MMHG | OXYGEN SATURATION: 100 % | RESPIRATION RATE: 18 BRPM | BODY MASS INDEX: 33.74 KG/M2 | HEIGHT: 70 IN | TEMPERATURE: 98 F | HEART RATE: 76 BPM

## 2020-04-02 DIAGNOSIS — R20.8 SUPRAPUBIC ABDOMINAL BURNING SENSATION: Primary | ICD-10-CM

## 2020-04-02 PROCEDURE — 83690 ASSAY OF LIPASE: CPT | Performed by: EMERGENCY MEDICINE

## 2020-04-02 PROCEDURE — 85025 COMPLETE CBC W/AUTO DIFF WBC: CPT | Performed by: EMERGENCY MEDICINE

## 2020-04-02 PROCEDURE — 81001 URINALYSIS AUTO W/SCOPE: CPT | Performed by: EMERGENCY MEDICINE

## 2020-04-02 PROCEDURE — 80076 HEPATIC FUNCTION PANEL: CPT | Performed by: EMERGENCY MEDICINE

## 2020-04-02 PROCEDURE — 80048 BASIC METABOLIC PNL TOTAL CA: CPT | Performed by: EMERGENCY MEDICINE

## 2020-04-02 RX ORDER — CEPHALEXIN 500 MG/1
500 CAPSULE ORAL 3 TIMES DAILY
Qty: 15 CAPSULE | Refills: 0 | Status: SHIPPED | OUTPATIENT
Start: 2020-04-02 | End: 2020-04-07

## 2020-04-02 NOTE — ED INITIAL ASSESSMENT (HPI)
C/o mid abd burning that radiates to lower abd. Also with increased frequency with burning that started today. Pt states +nauea x1 week. No medications taken pta.

## 2020-04-02 NOTE — ED PROVIDER NOTES
Patient Seen in: [facility] AND [facility] Emergency Department      History   Patient presents with:  Abdomen/Flank Pain    Stated Complaint: Periumbilical Burning -Denies radiating pain or pos UCG    HPI    56-year-old female presenting for evaluation of lower effort is normal.      Breath sounds: Normal breath sounds. Abdominal:      General: There is no distension. Palpations: Abdomen is soft. Tenderness: There is no tenderness. There is no right CVA tenderness or left CVA tenderness.    Musculoskel no abdominal pain on exam.    Laboratory studies reviewed as above. There is no leukocytosis, electrolyte imbalance or anemia. Urine pregnancy negative.   Possible UTI however there is some contamination with squamous cells, will send for culture and alex

## 2020-04-06 ENCOUNTER — HOSPITAL ENCOUNTER (EMERGENCY)
Facility: HOSPITAL | Age: 20
Discharge: HOME | End: 2020-04-06
Attending: EMERGENCY MEDICINE
Payer: MEDICAID

## 2020-04-06 ENCOUNTER — APPOINTMENT (OUTPATIENT)
Dept: ULTRASOUND IMAGING | Facility: HOSPITAL | Age: 20
End: 2020-04-06
Attending: EMERGENCY MEDICINE
Payer: MEDICAID

## 2020-04-06 VITALS
HEIGHT: 70 IN | TEMPERATURE: 98 F | BODY MASS INDEX: 33.64 KG/M2 | HEART RATE: 77 BPM | WEIGHT: 235 LBS | SYSTOLIC BLOOD PRESSURE: 119 MMHG | OXYGEN SATURATION: 99 % | DIASTOLIC BLOOD PRESSURE: 70 MMHG | RESPIRATION RATE: 15 BRPM

## 2020-04-06 DIAGNOSIS — M72.2 PLANTAR FASCIITIS OF LEFT FOOT: Primary | ICD-10-CM

## 2020-04-06 DIAGNOSIS — S86.812A STRAIN OF CALF MUSCLE, LEFT, INITIAL ENCOUNTER: ICD-10-CM

## 2020-04-06 PROCEDURE — 99284 EMERGENCY DEPT VISIT MOD MDM: CPT

## 2020-04-06 PROCEDURE — 93971 EXTREMITY STUDY: CPT | Performed by: EMERGENCY MEDICINE

## 2020-04-07 NOTE — ED PROVIDER NOTES
Patient Seen in: [facility] AND [facility] Emergency Department      History   Patient presents with:  Lower Extremity Injury    Stated Complaint: left foot and calf pain    HPI    The patient is a 69-year-old female who woke up yesterday morning with severe sanchez Breath sounds: Normal breath sounds. Musculoskeletal:      Left lower leg: She exhibits tenderness. She exhibits no bony tenderness and no swelling. No edema. Legs:       Left foot: Normal range of motion and normal capillary refill.  Tenderness

## 2020-04-07 NOTE — ED INITIAL ASSESSMENT (HPI)
Pain in foot starting yesterday morning after waking. No trauma. Patient unsure if there is swelling.

## 2020-04-13 ENCOUNTER — HOSPITAL ENCOUNTER (EMERGENCY)
Facility: HOSPITAL | Age: 20
Discharge: HOME | End: 2020-04-13
Attending: EMERGENCY MEDICINE
Payer: MEDICAID

## 2020-04-13 ENCOUNTER — APPOINTMENT (OUTPATIENT)
Dept: GENERAL RADIOLOGY | Facility: HOSPITAL | Age: 20
End: 2020-04-13
Attending: EMERGENCY MEDICINE
Payer: MEDICAID

## 2020-04-13 VITALS
WEIGHT: 235 LBS | HEART RATE: 107 BPM | DIASTOLIC BLOOD PRESSURE: 88 MMHG | HEIGHT: 70 IN | TEMPERATURE: 97 F | SYSTOLIC BLOOD PRESSURE: 122 MMHG | BODY MASS INDEX: 33.64 KG/M2 | RESPIRATION RATE: 18 BRPM | OXYGEN SATURATION: 100 %

## 2020-04-13 DIAGNOSIS — B34.9 VIRAL SYNDROME: Primary | ICD-10-CM

## 2020-04-13 PROCEDURE — 99284 EMERGENCY DEPT VISIT MOD MDM: CPT

## 2020-04-13 PROCEDURE — 93005 ELECTROCARDIOGRAM TRACING: CPT

## 2020-04-13 PROCEDURE — 71045 X-RAY EXAM CHEST 1 VIEW: CPT | Performed by: EMERGENCY MEDICINE

## 2020-04-13 PROCEDURE — 93010 ELECTROCARDIOGRAM REPORT: CPT | Performed by: EMERGENCY MEDICINE

## 2020-04-14 NOTE — ED NOTES
Patient complains of upper back pain for past few days. Patient states this may be due to sleeping on floor. Patient notes when she turns to her side she feels the pain across her body. Patient alert and talking in full complete sentences.

## 2020-04-14 NOTE — ED PROVIDER NOTES
Patient Seen in: [facility] AND [facility] Emergency Department      History   Patient presents with:  Back Pain  Chest Pain Angina    Stated Complaint: chest pain and back pain    HPI    17-year-old female with no significant past medical history presents to  Neck supple. No tracheal deviation present. CV: s1s2+, RRR, no m/r/g, normal distal pulses  Pulmonary/Chest: CTA b/l with no rales, wheezes. No chest wall tenderness  Abdominal: Nontender. Nondistended. Soft. Bowel sounds are normal.   Back:   :    Mu List

## 2020-04-18 ENCOUNTER — HOSPITAL ENCOUNTER (EMERGENCY)
Facility: HOSPITAL | Age: 20
Discharge: HOME | End: 2020-04-18
Attending: EMERGENCY MEDICINE
Payer: MEDICAID

## 2020-04-18 ENCOUNTER — APPOINTMENT (OUTPATIENT)
Dept: GENERAL RADIOLOGY | Facility: HOSPITAL | Age: 20
End: 2020-04-18
Attending: EMERGENCY MEDICINE
Payer: MEDICAID

## 2020-04-18 VITALS
RESPIRATION RATE: 16 BRPM | WEIGHT: 235 LBS | HEART RATE: 93 BPM | SYSTOLIC BLOOD PRESSURE: 124 MMHG | OXYGEN SATURATION: 100 % | DIASTOLIC BLOOD PRESSURE: 95 MMHG | HEIGHT: 70 IN | TEMPERATURE: 98 F | BODY MASS INDEX: 33.64 KG/M2

## 2020-04-18 DIAGNOSIS — R06.02 SOB (SHORTNESS OF BREATH): Primary | ICD-10-CM

## 2020-04-18 DIAGNOSIS — M54.9 MODERATE BACK PAIN: ICD-10-CM

## 2020-04-18 DIAGNOSIS — R10.84 ABDOMINAL PAIN, GENERALIZED: ICD-10-CM

## 2020-04-18 PROCEDURE — 93005 ELECTROCARDIOGRAM TRACING: CPT

## 2020-04-18 PROCEDURE — 71045 X-RAY EXAM CHEST 1 VIEW: CPT | Performed by: EMERGENCY MEDICINE

## 2020-04-18 PROCEDURE — 80048 BASIC METABOLIC PNL TOTAL CA: CPT | Performed by: EMERGENCY MEDICINE

## 2020-04-18 PROCEDURE — 99284 EMERGENCY DEPT VISIT MOD MDM: CPT

## 2020-04-18 PROCEDURE — 85025 COMPLETE CBC W/AUTO DIFF WBC: CPT | Performed by: EMERGENCY MEDICINE

## 2020-04-18 PROCEDURE — 93010 ELECTROCARDIOGRAM REPORT: CPT | Performed by: EMERGENCY MEDICINE

## 2020-04-18 PROCEDURE — 36415 COLL VENOUS BLD VENIPUNCTURE: CPT

## 2020-04-19 NOTE — ED INITIAL ASSESSMENT (HPI)
Patient reports lightheadedness, rapid heart rate, shortness of breath, medial upper back pain, trouble sleeping and abd pain with bending over. Adds diarrhea and intermittent nausea. Denies vomiting, fever/cough.  Started taking cephalexin for an infection

## 2020-06-02 ENCOUNTER — HOSPITAL ENCOUNTER (EMERGENCY)
Facility: HOSPITAL | Age: 20
Discharge: HOME | End: 2020-06-02
Attending: EMERGENCY MEDICINE
Payer: MEDICAID

## 2020-06-02 VITALS
SYSTOLIC BLOOD PRESSURE: 127 MMHG | DIASTOLIC BLOOD PRESSURE: 87 MMHG | HEART RATE: 77 BPM | TEMPERATURE: 98 F | OXYGEN SATURATION: 99 % | RESPIRATION RATE: 16 BRPM

## 2020-06-02 DIAGNOSIS — N30.01 ACUTE CYSTITIS WITH HEMATURIA: Primary | ICD-10-CM

## 2020-06-02 PROCEDURE — 81001 URINALYSIS AUTO W/SCOPE: CPT | Performed by: EMERGENCY MEDICINE

## 2020-06-02 PROCEDURE — 99283 EMERGENCY DEPT VISIT LOW MDM: CPT

## 2020-06-02 PROCEDURE — 87086 URINE CULTURE/COLONY COUNT: CPT | Performed by: EMERGENCY MEDICINE

## 2020-06-02 PROCEDURE — 81025 URINE PREGNANCY TEST: CPT

## 2020-06-02 RX ORDER — NITROFURANTOIN 25; 75 MG/1; MG/1
100 CAPSULE ORAL 2 TIMES DAILY
Qty: 6 CAPSULE | Refills: 0 | Status: SHIPPED | OUTPATIENT
Start: 2020-06-02 | End: 2020-06-02

## 2020-06-02 RX ORDER — NITROFURANTOIN 25; 75 MG/1; MG/1
100 CAPSULE ORAL 2 TIMES DAILY
Qty: 10 CAPSULE | Refills: 0 | Status: SHIPPED | OUTPATIENT
Start: 2020-06-02 | End: 2020-06-07

## 2020-06-02 NOTE — ED PROVIDER NOTES
Patient Seen in: [facility] AND [facility] Emergency Department      History   Patient presents with:  Urinary Symptoms    Stated Complaint: uti    HPI    58-year-old female with no significant past medical history here with complaints of dysuria for 1 day.    ashly membranes are moist.   Eyes:      Extraocular Movements: Extraocular movements intact. Neck:      Musculoskeletal: Neck supple. Cardiovascular:      Rate and Rhythm: Regular rhythm.    Pulmonary:      Effort: Pulmonary effort is normal.   Abdominal: I personally reviewed available prior medical records for any recent pertinent discharge summaries, testing, and procedures, and reviewed those reports. Complicating Factors: The patient already has does not have a problem list on file.  to contribute to

## 2020-06-16 ENCOUNTER — HOSPITAL ENCOUNTER (EMERGENCY)
Facility: HOSPITAL | Age: 20
Discharge: HOME | End: 2020-06-16
Attending: EMERGENCY MEDICINE
Payer: MEDICAID

## 2020-06-16 VITALS
HEART RATE: 113 BPM | SYSTOLIC BLOOD PRESSURE: 128 MMHG | RESPIRATION RATE: 18 BRPM | OXYGEN SATURATION: 96 % | BODY MASS INDEX: 35.03 KG/M2 | HEIGHT: 66 IN | DIASTOLIC BLOOD PRESSURE: 74 MMHG | TEMPERATURE: 98 F | WEIGHT: 218 LBS

## 2020-06-16 DIAGNOSIS — N30.01 ACUTE CYSTITIS WITH HEMATURIA: Primary | ICD-10-CM

## 2020-06-16 PROCEDURE — 87086 URINE CULTURE/COLONY COUNT: CPT | Performed by: EMERGENCY MEDICINE

## 2020-06-16 PROCEDURE — 81001 URINALYSIS AUTO W/SCOPE: CPT | Performed by: EMERGENCY MEDICINE

## 2020-06-16 PROCEDURE — 99283 EMERGENCY DEPT VISIT LOW MDM: CPT

## 2020-06-16 PROCEDURE — 81025 URINE PREGNANCY TEST: CPT

## 2020-06-16 RX ORDER — SULFAMETHOXAZOLE AND TRIMETHOPRIM 800; 160 MG/1; MG/1
1 TABLET ORAL 2 TIMES DAILY
Qty: 14 TABLET | Refills: 0 | Status: ON HOLD | OUTPATIENT
Start: 2020-06-16 | End: 2020-06-18

## 2020-06-16 RX ORDER — CEPHALEXIN 500 MG/1
500 CAPSULE ORAL 2 TIMES DAILY
Qty: 14 CAPSULE | Refills: 0 | Status: SHIPPED | OUTPATIENT
Start: 2020-06-16 | End: 2020-06-16

## 2020-06-16 NOTE — ED PROVIDER NOTES
Patient Seen in: [facility] AND [facility] Emergency Department      History   No chief complaint on file. Stated Complaint: Urinary Symptoms    HPI    18-year-old female presents for complaint of urinary frequency urgency and dysuria since yesterday.   She re Lids are normal.      Extraocular Movements: Extraocular movements intact. Neck:      Musculoskeletal: Normal range of motion and neck supple. Cardiovascular:      Rate and Rhythm: Normal rate and regular rhythm. Pulses: Normal pulses.    Pulmonary radiology findings were discussed with the patient. Patient is advised to follow up with PCP for reevaluation. Return precautions were given. Patient voices understanding and agreement with the treatment plan. All questions were addressed and answered.

## 2020-06-16 NOTE — ED INITIAL ASSESSMENT (HPI)
Patient here with complaints of urinary burning and frequency that began yesterday. States she has a history of UTI.

## 2020-06-17 ENCOUNTER — ANESTHESIA (OUTPATIENT)
Dept: SURGERY | Facility: HOSPITAL | Age: 20
DRG: 163 | End: 2020-06-17
Payer: MEDICAID

## 2020-06-17 ENCOUNTER — ANESTHESIA EVENT (OUTPATIENT)
Dept: SURGERY | Facility: HOSPITAL | Age: 20
DRG: 163 | End: 2020-06-17
Payer: MEDICAID

## 2020-06-17 ENCOUNTER — APPOINTMENT (OUTPATIENT)
Dept: GENERAL RADIOLOGY | Facility: HOSPITAL | Age: 20
DRG: 163 | End: 2020-06-17
Attending: THORACIC SURGERY (CARDIOTHORACIC VASCULAR SURGERY)
Payer: MEDICAID

## 2020-06-17 ENCOUNTER — APPOINTMENT (OUTPATIENT)
Dept: GENERAL RADIOLOGY | Facility: HOSPITAL | Age: 20
DRG: 163 | End: 2020-06-17
Attending: NURSE PRACTITIONER
Payer: MEDICAID

## 2020-06-17 ENCOUNTER — HOSPITAL ENCOUNTER (INPATIENT)
Facility: HOSPITAL | Age: 20
LOS: 1 days | Discharge: ANOTHER HOSPITAL | DRG: 163 | End: 2020-06-18
Attending: EMERGENCY MEDICINE | Admitting: THORACIC SURGERY (CARDIOTHORACIC VASCULAR SURGERY)
Payer: MEDICAID

## 2020-06-17 ENCOUNTER — APPOINTMENT (OUTPATIENT)
Dept: GENERAL RADIOLOGY | Facility: HOSPITAL | Age: 20
DRG: 163 | End: 2020-06-17
Attending: EMERGENCY MEDICINE
Payer: MEDICAID

## 2020-06-17 ENCOUNTER — APPOINTMENT (OUTPATIENT)
Dept: INTERVENTIONAL RADIOLOGY/VASCULAR | Facility: HOSPITAL | Age: 20
DRG: 163 | End: 2020-06-17
Attending: INTERNAL MEDICINE
Payer: MEDICAID

## 2020-06-17 DIAGNOSIS — R58 BLEEDING: ICD-10-CM

## 2020-06-17 DIAGNOSIS — R57.0 CARDIOGENIC SHOCK (HCC): Primary | ICD-10-CM

## 2020-06-17 PROBLEM — I26.99 PULMONARY EMBOLISM AND INFARCTION (HCC): Status: ACTIVE | Noted: 2020-06-17

## 2020-06-17 PROCEDURE — 71045 X-RAY EXAM CHEST 1 VIEW: CPT | Performed by: EMERGENCY MEDICINE

## 2020-06-17 PROCEDURE — 4A023N6 MEASUREMENT OF CARDIAC SAMPLING AND PRESSURE, RIGHT HEART, PERCUTANEOUS APPROACH: ICD-10-PCS | Performed by: INTERNAL MEDICINE

## 2020-06-17 PROCEDURE — 71045 X-RAY EXAM CHEST 1 VIEW: CPT | Performed by: THORACIC SURGERY (CARDIOTHORACIC VASCULAR SURGERY)

## 2020-06-17 PROCEDURE — 02CQ3ZZ EXTIRPATION OF MATTER FROM RIGHT PULMONARY ARTERY, PERCUTANEOUS APPROACH: ICD-10-PCS | Performed by: THORACIC SURGERY (CARDIOTHORACIC VASCULAR SURGERY)

## 2020-06-17 PROCEDURE — 77001 FLUOROGUIDE FOR VEIN DEVICE: CPT | Performed by: THORACIC SURGERY (CARDIOTHORACIC VASCULAR SURGERY)

## 2020-06-17 PROCEDURE — 30233N1 TRANSFUSION OF NONAUTOLOGOUS RED BLOOD CELLS INTO PERIPHERAL VEIN, PERCUTANEOUS APPROACH: ICD-10-PCS | Performed by: THORACIC SURGERY (CARDIOTHORACIC VASCULAR SURGERY)

## 2020-06-17 PROCEDURE — 76942 ECHO GUIDE FOR BIOPSY: CPT | Performed by: ANESTHESIOLOGY

## 2020-06-17 PROCEDURE — 0BH17EZ INSERTION OF ENDOTRACHEAL AIRWAY INTO TRACHEA, VIA NATURAL OR ARTIFICIAL OPENING: ICD-10-PCS | Performed by: EMERGENCY MEDICINE

## 2020-06-17 PROCEDURE — 02QQ0ZZ REPAIR RIGHT PULMONARY ARTERY, OPEN APPROACH: ICD-10-PCS | Performed by: THORACIC SURGERY (CARDIOTHORACIC VASCULAR SURGERY)

## 2020-06-17 PROCEDURE — 02CP0ZZ EXTIRPATION OF MATTER FROM PULMONARY TRUNK, OPEN APPROACH: ICD-10-PCS | Performed by: THORACIC SURGERY (CARDIOTHORACIC VASCULAR SURGERY)

## 2020-06-17 PROCEDURE — 02CR3ZZ EXTIRPATION OF MATTER FROM LEFT PULMONARY ARTERY, PERCUTANEOUS APPROACH: ICD-10-PCS | Performed by: THORACIC SURGERY (CARDIOTHORACIC VASCULAR SURGERY)

## 2020-06-17 PROCEDURE — 02HA3RZ INSERTION OF SHORT-TERM EXTERNAL HEART ASSIST SYSTEM INTO HEART, PERCUTANEOUS APPROACH: ICD-10-PCS | Performed by: INTERNAL MEDICINE

## 2020-06-17 PROCEDURE — 5A1945Z RESPIRATORY VENTILATION, 24-96 CONSECUTIVE HOURS: ICD-10-PCS | Performed by: INTERNAL MEDICINE

## 2020-06-17 PROCEDURE — 93312 ECHO TRANSESOPHAGEAL: CPT | Performed by: ANESTHESIOLOGY

## 2020-06-17 PROCEDURE — 71045 X-RAY EXAM CHEST 1 VIEW: CPT | Performed by: NURSE PRACTITIONER

## 2020-06-17 PROCEDURE — 30233R1 TRANSFUSION OF NONAUTOLOGOUS PLATELETS INTO PERIPHERAL VEIN, PERCUTANEOUS APPROACH: ICD-10-PCS | Performed by: THORACIC SURGERY (CARDIOTHORACIC VASCULAR SURGERY)

## 2020-06-17 PROCEDURE — B24BZZ4 ULTRASONOGRAPHY OF HEART WITH AORTA, TRANSESOPHAGEAL: ICD-10-PCS | Performed by: ANESTHESIOLOGY

## 2020-06-17 PROCEDURE — 36430 TRANSFUSION BLD/BLD COMPNT: CPT | Performed by: ANESTHESIOLOGY

## 2020-06-17 PROCEDURE — 5A12012 PERFORMANCE OF CARDIAC OUTPUT, SINGLE, MANUAL: ICD-10-PCS | Performed by: THORACIC SURGERY (CARDIOTHORACIC VASCULAR SURGERY)

## 2020-06-17 PROCEDURE — 5A0221D ASSISTANCE WITH CARDIAC OUTPUT USING IMPELLER PUMP, CONTINUOUS: ICD-10-PCS | Performed by: INTERNAL MEDICINE

## 2020-06-17 PROCEDURE — 3E03317 INTRODUCTION OF OTHER THROMBOLYTIC INTO PERIPHERAL VEIN, PERCUTANEOUS APPROACH: ICD-10-PCS | Performed by: THORACIC SURGERY (CARDIOTHORACIC VASCULAR SURGERY)

## 2020-06-17 PROCEDURE — 30233M1 TRANSFUSION OF NONAUTOLOGOUS PLASMA CRYOPRECIPITATE INTO PERIPHERAL VEIN, PERCUTANEOUS APPROACH: ICD-10-PCS | Performed by: THORACIC SURGERY (CARDIOTHORACIC VASCULAR SURGERY)

## 2020-06-17 PROCEDURE — 99291 CRITICAL CARE FIRST HOUR: CPT | Performed by: INTERNAL MEDICINE

## 2020-06-17 PROCEDURE — 0W3C0ZZ CONTROL BLEEDING IN MEDIASTINUM, OPEN APPROACH: ICD-10-PCS | Performed by: THORACIC SURGERY (CARDIOTHORACIC VASCULAR SURGERY)

## 2020-06-17 PROCEDURE — 30233K1 TRANSFUSION OF NONAUTOLOGOUS FROZEN PLASMA INTO PERIPHERAL VEIN, PERCUTANEOUS APPROACH: ICD-10-PCS | Performed by: THORACIC SURGERY (CARDIOTHORACIC VASCULAR SURGERY)

## 2020-06-17 PROCEDURE — 74019 RADEX ABDOMEN 2 VIEWS: CPT | Performed by: THORACIC SURGERY (CARDIOTHORACIC VASCULAR SURGERY)

## 2020-06-17 RX ORDER — MILRINONE LACTATE 0.2 MG/ML
0.38 INJECTION, SOLUTION INTRAVENOUS AS NEEDED
Status: DISCONTINUED | OUTPATIENT
Start: 2020-06-17 | End: 2020-06-18

## 2020-06-17 RX ORDER — HEPARIN SODIUM 1000 [USP'U]/ML
INJECTION, SOLUTION INTRAVENOUS; SUBCUTANEOUS AS NEEDED
Status: DISCONTINUED | OUTPATIENT
Start: 2020-06-17 | End: 2020-06-17 | Stop reason: HOSPADM

## 2020-06-17 RX ORDER — GLYCOPYRROLATE 0.2 MG/ML
0.6 INJECTION, SOLUTION INTRAMUSCULAR; INTRAVENOUS ONCE
Status: DISCONTINUED | OUTPATIENT
Start: 2020-06-17 | End: 2020-06-18

## 2020-06-17 RX ORDER — SODIUM CHLORIDE 9 MG/ML
INJECTION, SOLUTION INTRAVENOUS ONCE
Status: DISCONTINUED | OUTPATIENT
Start: 2020-06-17 | End: 2020-06-18

## 2020-06-17 RX ORDER — MAGNESIUM OXIDE 400 MG (241.3 MG MAGNESIUM) TABLET
3 TABLET NIGHTLY PRN
Status: DISCONTINUED | OUTPATIENT
Start: 2020-06-17 | End: 2020-06-18

## 2020-06-17 RX ORDER — POLYETHYLENE GLYCOL 3350 17 G/17G
17 POWDER, FOR SOLUTION ORAL DAILY PRN
Status: DISCONTINUED | OUTPATIENT
Start: 2020-06-17 | End: 2020-06-17

## 2020-06-17 RX ORDER — MILRINONE LACTATE 0.2 MG/ML
0.38 INJECTION, SOLUTION INTRAVENOUS AS NEEDED
Status: DISCONTINUED | OUTPATIENT
Start: 2020-06-17 | End: 2020-06-17

## 2020-06-17 RX ORDER — ACETAMINOPHEN 325 MG/1
650 TABLET ORAL EVERY 4 HOURS PRN
Status: DISCONTINUED | OUTPATIENT
Start: 2020-06-18 | End: 2020-06-18

## 2020-06-17 RX ORDER — MAGNESIUM SULFATE 1 G/100ML
1 INJECTION INTRAVENOUS AS NEEDED
Status: DISCONTINUED | OUTPATIENT
Start: 2020-06-17 | End: 2020-06-17

## 2020-06-17 RX ORDER — DOCUSATE SODIUM 100 MG/1
100 CAPSULE, LIQUID FILLED ORAL 2 TIMES DAILY
Status: DISCONTINUED | OUTPATIENT
Start: 2020-06-17 | End: 2020-06-17

## 2020-06-17 RX ORDER — CHLORHEXIDINE GLUCONATE 0.12 MG/ML
15 RINSE ORAL 2 TIMES DAILY
Status: DISCONTINUED | OUTPATIENT
Start: 2020-06-17 | End: 2020-06-17

## 2020-06-17 RX ORDER — NEOSTIGMINE METHYLSULFATE 1 MG/ML
3 INJECTION INTRAVENOUS ONCE
Status: DISCONTINUED | OUTPATIENT
Start: 2020-06-17 | End: 2020-06-18

## 2020-06-17 RX ORDER — POTASSIUM CHLORIDE 29.8 MG/ML
40 INJECTION INTRAVENOUS AS NEEDED
Status: DISCONTINUED | OUTPATIENT
Start: 2020-06-17 | End: 2020-06-17

## 2020-06-17 RX ORDER — MAGNESIUM SULFATE HEPTAHYDRATE 40 MG/ML
2 INJECTION, SOLUTION INTRAVENOUS AS NEEDED
Status: DISCONTINUED | OUTPATIENT
Start: 2020-06-17 | End: 2020-06-18

## 2020-06-17 RX ORDER — ONDANSETRON 2 MG/ML
4 INJECTION INTRAMUSCULAR; INTRAVENOUS EVERY 6 HOURS PRN
Status: DISCONTINUED | OUTPATIENT
Start: 2020-06-17 | End: 2020-06-18

## 2020-06-17 RX ORDER — METHYLPREDNISOLONE SODIUM SUCCINATE 125 MG/2ML
125 INJECTION, POWDER, LYOPHILIZED, FOR SOLUTION INTRAMUSCULAR; INTRAVENOUS ONCE
Status: COMPLETED | OUTPATIENT
Start: 2020-06-17 | End: 2020-06-17

## 2020-06-17 RX ORDER — ONDANSETRON 2 MG/ML
4 INJECTION INTRAMUSCULAR; INTRAVENOUS EVERY 6 HOURS PRN
Status: DISCONTINUED | OUTPATIENT
Start: 2020-06-17 | End: 2020-06-17

## 2020-06-17 RX ORDER — BISACODYL 10 MG
10 SUPPOSITORY, RECTAL RECTAL
Status: DISCONTINUED | OUTPATIENT
Start: 2020-06-17 | End: 2020-06-17

## 2020-06-17 RX ORDER — PHENYLEPHRINE HCL 10 MG/ML
VIAL (ML) INJECTION AS NEEDED
Status: DISCONTINUED | OUTPATIENT
Start: 2020-06-17 | End: 2020-06-17 | Stop reason: SURG

## 2020-06-17 RX ORDER — POTASSIUM CHLORIDE 14.9 MG/ML
20 INJECTION INTRAVENOUS AS NEEDED
Status: DISCONTINUED | OUTPATIENT
Start: 2020-06-17 | End: 2020-06-18

## 2020-06-17 RX ORDER — MORPHINE SULFATE 2 MG/ML
2 INJECTION, SOLUTION INTRAMUSCULAR; INTRAVENOUS EVERY 2 HOUR PRN
Status: DISCONTINUED | OUTPATIENT
Start: 2020-06-17 | End: 2020-06-18

## 2020-06-17 RX ORDER — ACETAMINOPHEN 160 MG/5ML
650 SOLUTION ORAL EVERY 6 HOURS PRN
Status: DISCONTINUED | OUTPATIENT
Start: 2020-06-17 | End: 2020-06-18

## 2020-06-17 RX ORDER — METOCLOPRAMIDE HYDROCHLORIDE 5 MG/ML
10 INJECTION INTRAMUSCULAR; INTRAVENOUS EVERY 6 HOURS
Status: DISCONTINUED | OUTPATIENT
Start: 2020-06-17 | End: 2020-06-17

## 2020-06-17 RX ORDER — METOCLOPRAMIDE HYDROCHLORIDE 5 MG/ML
5 INJECTION INTRAMUSCULAR; INTRAVENOUS EVERY 6 HOURS
Status: DISCONTINUED | OUTPATIENT
Start: 2020-06-17 | End: 2020-06-18

## 2020-06-17 RX ORDER — DOBUTAMINE HYDROCHLORIDE 100 MG/100ML
INJECTION INTRAVENOUS CONTINUOUS PRN
Status: DISCONTINUED | OUTPATIENT
Start: 2020-06-17 | End: 2020-06-17 | Stop reason: SURG

## 2020-06-17 RX ORDER — ASCORBIC ACID 500 MG
500 TABLET ORAL 3 TIMES DAILY
Status: DISCONTINUED | OUTPATIENT
Start: 2020-06-18 | End: 2020-06-18

## 2020-06-17 RX ORDER — DOXEPIN HYDROCHLORIDE 50 MG/1
1 CAPSULE ORAL DAILY
Status: DISCONTINUED | OUTPATIENT
Start: 2020-06-18 | End: 2020-06-18

## 2020-06-17 RX ORDER — ACETAMINOPHEN 10 MG/ML
1000 INJECTION, SOLUTION INTRAVENOUS EVERY 8 HOURS
Status: DISPENSED | OUTPATIENT
Start: 2020-06-17 | End: 2020-06-18

## 2020-06-17 RX ORDER — FAMOTIDINE 10 MG/ML
20 INJECTION, SOLUTION INTRAVENOUS ONCE
Status: COMPLETED | OUTPATIENT
Start: 2020-06-17 | End: 2020-06-17

## 2020-06-17 RX ORDER — MAGNESIUM SULFATE 1 G/100ML
1 INJECTION INTRAVENOUS AS NEEDED
Status: DISCONTINUED | OUTPATIENT
Start: 2020-06-17 | End: 2020-06-18

## 2020-06-17 RX ORDER — FAMOTIDINE 10 MG/ML
20 INJECTION, SOLUTION INTRAVENOUS 2 TIMES DAILY
Status: DISCONTINUED | OUTPATIENT
Start: 2020-06-17 | End: 2020-06-17

## 2020-06-17 RX ORDER — SODIUM PHOSPHATE, DIBASIC AND SODIUM PHOSPHATE, MONOBASIC 7; 19 G/133ML; G/133ML
1 ENEMA RECTAL ONCE AS NEEDED
Status: DISCONTINUED | OUTPATIENT
Start: 2020-06-17 | End: 2020-06-17

## 2020-06-17 RX ORDER — ASCORBIC ACID 500 MG
500 TABLET ORAL 3 TIMES DAILY
Status: DISCONTINUED | OUTPATIENT
Start: 2020-06-18 | End: 2020-06-17

## 2020-06-17 RX ORDER — FAMOTIDINE 20 MG/1
20 TABLET ORAL 2 TIMES DAILY
Status: DISCONTINUED | OUTPATIENT
Start: 2020-06-17 | End: 2020-06-17

## 2020-06-17 RX ORDER — POTASSIUM CHLORIDE 14.9 MG/ML
20 INJECTION INTRAVENOUS AS NEEDED
Status: DISCONTINUED | OUTPATIENT
Start: 2020-06-17 | End: 2020-06-17

## 2020-06-17 RX ORDER — POLYETHYLENE GLYCOL 3350 17 G/17G
17 POWDER, FOR SOLUTION ORAL DAILY PRN
Status: DISCONTINUED | OUTPATIENT
Start: 2020-06-17 | End: 2020-06-18

## 2020-06-17 RX ORDER — FAMOTIDINE 20 MG/1
20 TABLET ORAL DAILY
Status: DISCONTINUED | OUTPATIENT
Start: 2020-06-17 | End: 2020-06-18

## 2020-06-17 RX ORDER — ALBUMIN, HUMAN INJ 5% 5 %
250 SOLUTION INTRAVENOUS ONCE AS NEEDED
Status: COMPLETED | OUTPATIENT
Start: 2020-06-17 | End: 2020-06-17

## 2020-06-17 RX ORDER — MORPHINE SULFATE 4 MG/ML
4 INJECTION, SOLUTION INTRAMUSCULAR; INTRAVENOUS EVERY 2 HOUR PRN
Status: DISCONTINUED | OUTPATIENT
Start: 2020-06-17 | End: 2020-06-18

## 2020-06-17 RX ORDER — METOCLOPRAMIDE HYDROCHLORIDE 5 MG/ML
10 INJECTION INTRAMUSCULAR; INTRAVENOUS EVERY 8 HOURS PRN
Status: DISCONTINUED | OUTPATIENT
Start: 2020-06-17 | End: 2020-06-17

## 2020-06-17 RX ORDER — POTASSIUM CHLORIDE 29.8 MG/ML
40 INJECTION INTRAVENOUS AS NEEDED
Status: DISCONTINUED | OUTPATIENT
Start: 2020-06-17 | End: 2020-06-18

## 2020-06-17 RX ORDER — HYDROCODONE BITARTRATE AND ACETAMINOPHEN 5; 325 MG/1; MG/1
2 TABLET ORAL EVERY 4 HOURS PRN
Status: DISCONTINUED | OUTPATIENT
Start: 2020-06-18 | End: 2020-06-18

## 2020-06-17 RX ORDER — DEXMEDETOMIDINE HYDROCHLORIDE 4 UG/ML
INJECTION, SOLUTION INTRAVENOUS CONTINUOUS
Status: DISCONTINUED | OUTPATIENT
Start: 2020-06-17 | End: 2020-06-18

## 2020-06-17 RX ORDER — CHLORHEXIDINE GLUCONATE 0.12 MG/ML
15 RINSE ORAL 2 TIMES DAILY
Status: DISCONTINUED | OUTPATIENT
Start: 2020-06-17 | End: 2020-06-18

## 2020-06-17 RX ORDER — SENNOSIDES 8.6 MG
8.6 TABLET ORAL 2 TIMES DAILY
Status: DISCONTINUED | OUTPATIENT
Start: 2020-06-18 | End: 2020-06-18

## 2020-06-17 RX ORDER — ALBUMIN, HUMAN INJ 5% 5 %
250 SOLUTION INTRAVENOUS ONCE AS NEEDED
Status: COMPLETED | OUTPATIENT
Start: 2020-06-17 | End: 2020-06-18

## 2020-06-17 RX ORDER — PROTAMINE SULFATE 10 MG/ML
INJECTION, SOLUTION INTRAVENOUS AS NEEDED
Status: DISCONTINUED | OUTPATIENT
Start: 2020-06-17 | End: 2020-06-17 | Stop reason: SURG

## 2020-06-17 RX ORDER — CHLORHEXIDINE GLUCONATE 0.12 MG/ML
15 RINSE ORAL
Status: DISCONTINUED | OUTPATIENT
Start: 2020-06-17 | End: 2020-06-17

## 2020-06-17 RX ORDER — ACETAMINOPHEN 325 MG/1
650 TABLET ORAL EVERY 6 HOURS PRN
Status: DISCONTINUED | OUTPATIENT
Start: 2020-06-17 | End: 2020-06-18

## 2020-06-17 RX ORDER — SODIUM CHLORIDE, SODIUM LACTATE, POTASSIUM CHLORIDE, CALCIUM CHLORIDE 600; 310; 30; 20 MG/100ML; MG/100ML; MG/100ML; MG/100ML
INJECTION, SOLUTION INTRAVENOUS CONTINUOUS PRN
Status: DISCONTINUED | OUTPATIENT
Start: 2020-06-17 | End: 2020-06-17 | Stop reason: SURG

## 2020-06-17 RX ORDER — MAGNESIUM SULFATE HEPTAHYDRATE 40 MG/ML
2 INJECTION, SOLUTION INTRAVENOUS AS NEEDED
Status: DISCONTINUED | OUTPATIENT
Start: 2020-06-17 | End: 2020-06-17

## 2020-06-17 RX ORDER — DEXMEDETOMIDINE HYDROCHLORIDE 4 UG/ML
INJECTION, SOLUTION INTRAVENOUS CONTINUOUS PRN
Status: DISCONTINUED | OUTPATIENT
Start: 2020-06-17 | End: 2020-06-17 | Stop reason: SURG

## 2020-06-17 RX ORDER — NITROGLYCERIN 20 MG/100ML
INJECTION INTRAVENOUS CONTINUOUS PRN
Status: DISCONTINUED | OUTPATIENT
Start: 2020-06-17 | End: 2020-06-18

## 2020-06-17 RX ORDER — NITROGLYCERIN 20 MG/100ML
INJECTION INTRAVENOUS CONTINUOUS PRN
Status: DISCONTINUED | OUTPATIENT
Start: 2020-06-17 | End: 2020-06-17

## 2020-06-17 RX ORDER — DIPHENHYDRAMINE HYDROCHLORIDE 50 MG/ML
50 INJECTION INTRAMUSCULAR; INTRAVENOUS ONCE
Status: COMPLETED | OUTPATIENT
Start: 2020-06-17 | End: 2020-06-17

## 2020-06-17 RX ORDER — CEFAZOLIN SODIUM/WATER 2 G/20 ML
2 SYRINGE (ML) INTRAVENOUS EVERY 8 HOURS
Status: DISCONTINUED | OUTPATIENT
Start: 2020-06-17 | End: 2020-06-18

## 2020-06-17 RX ORDER — ROCURONIUM BROMIDE 10 MG/ML
INJECTION, SOLUTION INTRAVENOUS AS NEEDED
Status: DISCONTINUED | OUTPATIENT
Start: 2020-06-17 | End: 2020-06-17 | Stop reason: SURG

## 2020-06-17 RX ORDER — MIDAZOLAM HYDROCHLORIDE 1 MG/ML
INJECTION INTRAMUSCULAR; INTRAVENOUS AS NEEDED
Status: DISCONTINUED | OUTPATIENT
Start: 2020-06-17 | End: 2020-06-17 | Stop reason: SURG

## 2020-06-17 RX ORDER — MAGNESIUM OXIDE 400 MG (241.3 MG MAGNESIUM) TABLET
3 TABLET NIGHTLY PRN
Status: DISCONTINUED | OUTPATIENT
Start: 2020-06-17 | End: 2020-06-17

## 2020-06-17 RX ORDER — HYDROCODONE BITARTRATE AND ACETAMINOPHEN 5; 325 MG/1; MG/1
1 TABLET ORAL EVERY 4 HOURS PRN
Status: DISCONTINUED | OUTPATIENT
Start: 2020-06-18 | End: 2020-06-18

## 2020-06-17 RX ORDER — CALCIUM CHLORIDE 100 MG/ML
INJECTION INTRAVENOUS; INTRAVENTRICULAR AS NEEDED
Status: DISCONTINUED | OUTPATIENT
Start: 2020-06-17 | End: 2020-06-17 | Stop reason: SURG

## 2020-06-17 RX ORDER — HEPARIN SODIUM 5000 [USP'U]/ML
5000 INJECTION, SOLUTION INTRAVENOUS; SUBCUTANEOUS EVERY 12 HOURS SCHEDULED
Status: DISCONTINUED | OUTPATIENT
Start: 2020-06-18 | End: 2020-06-18

## 2020-06-17 RX ORDER — MILRINONE LACTATE 0.2 MG/ML
INJECTION, SOLUTION INTRAVENOUS CONTINUOUS
Status: DISCONTINUED | OUTPATIENT
Start: 2020-06-17 | End: 2020-06-18

## 2020-06-17 RX ORDER — DOBUTAMINE HYDROCHLORIDE 200 MG/100ML
INJECTION INTRAVENOUS CONTINUOUS PRN
Status: DISCONTINUED | OUTPATIENT
Start: 2020-06-17 | End: 2020-06-18

## 2020-06-17 RX ORDER — CEFAZOLIN SODIUM/WATER 2 G/20 ML
SYRINGE (ML) INTRAVENOUS AS NEEDED
Status: DISCONTINUED | OUTPATIENT
Start: 2020-06-17 | End: 2020-06-17 | Stop reason: SURG

## 2020-06-17 RX ORDER — BISACODYL 10 MG
10 SUPPOSITORY, RECTAL RECTAL
Status: DISCONTINUED | OUTPATIENT
Start: 2020-06-17 | End: 2020-06-18

## 2020-06-17 RX ORDER — MORPHINE SULFATE 4 MG/ML
8 INJECTION, SOLUTION INTRAMUSCULAR; INTRAVENOUS EVERY 2 HOUR PRN
Status: DISCONTINUED | OUTPATIENT
Start: 2020-06-17 | End: 2020-06-18

## 2020-06-17 RX ORDER — DEXTROSE MONOHYDRATE 25 G/50ML
50 INJECTION, SOLUTION INTRAVENOUS
Status: DISCONTINUED | OUTPATIENT
Start: 2020-06-17 | End: 2020-06-18

## 2020-06-17 RX ORDER — DEXTROSE AND SODIUM CHLORIDE 5; .9 G/100ML; G/100ML
INJECTION, SOLUTION INTRAVENOUS CONTINUOUS
Status: DISCONTINUED | OUTPATIENT
Start: 2020-06-17 | End: 2020-06-18

## 2020-06-17 RX ORDER — LORAZEPAM 1 MG/1
1 TABLET ORAL ONCE
Status: DISCONTINUED | OUTPATIENT
Start: 2020-06-17 | End: 2020-06-17 | Stop reason: HOSPADM

## 2020-06-17 RX ORDER — SODIUM PHOSPHATE, DIBASIC AND SODIUM PHOSPHATE, MONOBASIC 7; 19 G/133ML; G/133ML
1 ENEMA RECTAL ONCE AS NEEDED
Status: DISCONTINUED | OUTPATIENT
Start: 2020-06-17 | End: 2020-06-18

## 2020-06-17 RX ORDER — ASPIRIN 81 MG/1
81 TABLET ORAL DAILY
Status: DISCONTINUED | OUTPATIENT
Start: 2020-06-18 | End: 2020-06-18

## 2020-06-17 RX ORDER — MAGNESIUM HYDROXIDE 1200 MG/15ML
LIQUID ORAL CONTINUOUS PRN
Status: DISCONTINUED | OUTPATIENT
Start: 2020-06-17 | End: 2020-06-18

## 2020-06-17 RX ORDER — HEPARIN SODIUM 1000 [USP'U]/ML
INJECTION, SOLUTION INTRAVENOUS; SUBCUTANEOUS AS NEEDED
Status: DISCONTINUED | OUTPATIENT
Start: 2020-06-17 | End: 2020-06-17 | Stop reason: SURG

## 2020-06-17 RX ORDER — DOXEPIN HYDROCHLORIDE 50 MG/1
1 CAPSULE ORAL DAILY
Status: DISCONTINUED | OUTPATIENT
Start: 2020-06-18 | End: 2020-06-17

## 2020-06-17 RX ORDER — ACETAMINOPHEN 650 MG/1
650 SUPPOSITORY RECTAL EVERY 6 HOURS PRN
Status: DISCONTINUED | OUTPATIENT
Start: 2020-06-17 | End: 2020-06-18

## 2020-06-17 RX ORDER — FAMOTIDINE 10 MG/ML
20 INJECTION, SOLUTION INTRAVENOUS DAILY
Status: DISCONTINUED | OUTPATIENT
Start: 2020-06-17 | End: 2020-06-18

## 2020-06-17 RX ORDER — DOBUTAMINE HYDROCHLORIDE 200 MG/100ML
INJECTION INTRAVENOUS CONTINUOUS PRN
Status: DISCONTINUED | OUTPATIENT
Start: 2020-06-17 | End: 2020-06-17

## 2020-06-17 RX ADMIN — ROCURONIUM BROMIDE 50 MG: 10 INJECTION, SOLUTION INTRAVENOUS at 10:35:00

## 2020-06-17 RX ADMIN — Medication 50 ML: at 13:59:00

## 2020-06-17 RX ADMIN — PROTAMINE SULFATE 400 MG: 10 INJECTION, SOLUTION INTRAVENOUS at 14:02:00

## 2020-06-17 RX ADMIN — CEFAZOLIN SODIUM/WATER 2 G: 2 G/20 ML SYRINGE (ML) INTRAVENOUS at 09:37:00

## 2020-06-17 RX ADMIN — CEFAZOLIN SODIUM/WATER 2 G: 2 G/20 ML SYRINGE (ML) INTRAVENOUS at 20:01:00

## 2020-06-17 RX ADMIN — DOBUTAMINE HYDROCHLORIDE 3 MCG/KG/MIN: 100 INJECTION INTRAVENOUS at 10:57:00

## 2020-06-17 RX ADMIN — MIDAZOLAM HYDROCHLORIDE 2 MG: 1 INJECTION INTRAMUSCULAR; INTRAVENOUS at 10:36:00

## 2020-06-17 RX ADMIN — SODIUM CHLORIDE, SODIUM LACTATE, POTASSIUM CHLORIDE, CALCIUM CHLORIDE: 600; 310; 30; 20 INJECTION, SOLUTION INTRAVENOUS at 15:25:00

## 2020-06-17 RX ADMIN — SODIUM CHLORIDE, SODIUM LACTATE, POTASSIUM CHLORIDE, CALCIUM CHLORIDE: 600; 310; 30; 20 INJECTION, SOLUTION INTRAVENOUS at 09:44:00

## 2020-06-17 RX ADMIN — DEXMEDETOMIDINE HYDROCHLORIDE 0.8 MCG/KG/HR: 4 INJECTION, SOLUTION INTRAVENOUS at 19:43:00

## 2020-06-17 RX ADMIN — Medication 50 ML: at 10:00:00

## 2020-06-17 RX ADMIN — CALCIUM CHLORIDE 1 G: 100 INJECTION INTRAVENOUS; INTRAVENTRICULAR at 11:55:00

## 2020-06-17 RX ADMIN — CALCIUM CHLORIDE 1 G: 100 INJECTION INTRAVENOUS; INTRAVENTRICULAR at 09:49:00

## 2020-06-17 RX ADMIN — HEPARIN SODIUM 42000 UNITS: 1000 INJECTION, SOLUTION INTRAVENOUS; SUBCUTANEOUS at 10:10:00

## 2020-06-17 RX ADMIN — CEFAZOLIN SODIUM/WATER 2 G: 2 G/20 ML SYRINGE (ML) INTRAVENOUS at 13:37:00

## 2020-06-17 RX ADMIN — DOBUTAMINE HYDROCHLORIDE 5 MCG/KG/MIN: 100 INJECTION INTRAVENOUS at 11:09:00

## 2020-06-17 RX ADMIN — ROCURONIUM BROMIDE 40 MG: 10 INJECTION, SOLUTION INTRAVENOUS at 19:47:00

## 2020-06-17 RX ADMIN — PHENYLEPHRINE HCL 200 MCG: 10 MG/ML VIAL (ML) INJECTION at 11:55:00

## 2020-06-17 RX ADMIN — Medication 50 ML: at 13:51:00

## 2020-06-17 RX ADMIN — CALCIUM CHLORIDE 1 G: 100 INJECTION INTRAVENOUS; INTRAVENTRICULAR at 10:05:00

## 2020-06-17 RX ADMIN — PROTAMINE SULFATE 100 MG: 10 INJECTION, SOLUTION INTRAVENOUS at 14:32:00

## 2020-06-17 RX ADMIN — Medication 50 ML: at 14:09:00

## 2020-06-17 RX ADMIN — Medication 50 ML: at 09:49:00

## 2020-06-17 RX ADMIN — DOBUTAMINE HYDROCHLORIDE 3 MCG/KG/MIN: 100 INJECTION INTRAVENOUS at 13:35:00

## 2020-06-17 RX ADMIN — MILRINONE LACTATE 0.38 MCG/KG/MIN: 0.2 INJECTION, SOLUTION INTRAVENOUS at 11:10:00

## 2020-06-17 RX ADMIN — MILRINONE LACTATE 0.25 MCG/KG/MIN: 0.2 INJECTION, SOLUTION INTRAVENOUS at 14:31:00

## 2020-06-17 RX ADMIN — MIDAZOLAM HYDROCHLORIDE 2 MG: 1 INJECTION INTRAMUSCULAR; INTRAVENOUS at 13:36:00

## 2020-06-17 NOTE — CONSULTS
[facility]    Report of Consultation    Nancy Johnson Patient Status:  Emergency    2000 MRN [id number]   Location [address] Attending [address] # 0 PCP Yon Naylor MD, [place] Constitutional: No fever chills weight loss  HEENT: Denies eye pain, hearing loss, nosebleed  Pulmonary: No cough hemoptysis or wheeze.   Cardiac denies chest pain, syncope, palpitations  GI: No hematemesis melena or change in bowel habits   denies dysu 06/17/2020    BUN 10 06/17/2020     06/17/2020    K 2.8 (LL) 06/17/2020     06/17/2020    CO2 17.0 (L) 06/17/2020     (H) 06/17/2020    CA 8.6 06/17/2020    ALB 3.2 (L) 06/17/2020    ALKPHO 42 (L) 06/17/2020    TP 8.0 06/17/2020    AST 1

## 2020-06-17 NOTE — ED NOTES
1mg of epi given by Jennifer Feliciano - per MD Tiffany Gongora, pt hypotensive BP 57/41 - HR @ 152.

## 2020-06-17 NOTE — ANESTHESIA PROCEDURE NOTES
Central Line  Performed by: Jarek Vance MD  Authorized by: Jarek Vance MD     General Information and Staff    Procedure Start:   Anesthesiologist: Jarek Vance MD  Performed by:   Anesthesiologist  Patient Location:  OR  Indication: central venous

## 2020-06-17 NOTE — H&P
5 [facility] Patient Status:  Emergency    2000 MRN [id number]   Location [address] Attending [address] # 0 PCP Gilberto West MD, [facility] Intravenous, Code/Trauma Med  midazolam HCl (VERSED) 50 mg in sodium chloride 0.9% 100 mL infusion, 0.02-0.1 mg/kg/hr, Intravenous, Continuous      (Not in a hospital admission)      Allergies    Sulfa Antibiotics       UNKNOWN    Review of Systems:   Tavares Castillo vasculature. MEDIAST/SHOLA:   No visible mass or adenopathy. LUNGS/PLEURA: Normal.  No significant pulmonary parenchymal abnormalities. No effusion or pleural thickening. BONES: No fracture or visible bony lesion.  OTHER: Endotracheal tube is in good posit epinephrine, vasopressin. Requiring inotropic support with dobutamine and milrinone. Continue full ventilatory support. Receiving nitric oxide. Further recommendations per cardiology and thoracic surgery. Discussed with mother.

## 2020-06-17 NOTE — ANESTHESIA PROCEDURE NOTES
Procedure Performed: JOSE    Start Time:        End Time:      Preanesthesia Checklist:  Patient identified, IV assessed, risks and benefits discussed, monitors and equipment assessed, procedure being performed at surgeon's request and anesthesia consent ob

## 2020-06-17 NOTE — RESPIRATORY THERAPY NOTE
RESPIRATORY THERAPY PATIENT TRANSPORT NOTE    Transported from: ED- 32  Transported to: OR    Patient is intubated?:yes  Transport ventilator used?:no  Resuscitation bag used during transport?:yes   ETT placement verified post-transport.

## 2020-06-17 NOTE — ED PROVIDER NOTES
Patient Seen in: [facility] AND [facility] Operating Room      History   Patient presents with:  Altered Mental Status    Stated Complaint: dyspnea, ams    HPI    68-year-old female presents via EMS for shortness of breath.   On arrival patient was altered, and s distress. Appearance: She is diaphoretic. HENT:      Mouth/Throat:      Lips: Pink.       Mouth: Mucous membranes are moist.   Eyes:      General: Lids are normal.      Conjunctiva/sclera: Conjunctivae normal.   Neck:      Musculoskeletal: Normal ran Natriuretic Peptide 5,674 (*)     All other components within normal limits   C-REACTIVE PROTEIN - Abnormal; Notable for the following components:    C-Reactive Protein 2.35 (*)     All other components within normal limits   D-DIMER - Abnormal; Notable fo Normal   CBC WITH DIFFERENTIAL WITH PLATELET    Narrative: The following orders were created for panel order CBC WITH DIFFERENTIAL WITH PLATELET.   Procedure                               Abnormality         Status                     --------- NA  Cricoid Pressure:  no  Cords Visualized:  yes  Cuff inflated?   yes  ETT to lip (cm): 23  ETT to teeth (cm):    Tube secured with:  ETT murphy  Tube Size: 7.5  Tube type: Cuffed  Post-Procedure Assessment:  Chest Rise, CO2 Detector and auscultation   X- surgery, Dr. Jude Orta, who then showed up. We placed the [facility] machine on the patient to assist with perfusion. Patient's pulses were severely diminished with poor cardiac output and this did help to maintain perfusion.   Dr. Blanche Artis with pulmonology was also diagnosis)  Bleeding    Disposition:  Send to or/cath/gi  6/17/2020 10:53 am    Follow-up:  No follow-up provider specified.         Medications Prescribed:  Current Discharge Medication List

## 2020-06-17 NOTE — PROCEDURES
[facility]    [facility] Cardiac Cath Procedure Note  Carolyn Carreno Patient Status:  Inpatient    2000 MRN [id number]   Location [facility] 2W/SW Attending Sravani Nguyen MD   Hosp Day # 0 PCP Oralia Harrell MD, Katya Cobos groin accessed via palpation and a 25 Turkish introducer sheath was inserted into the left femoral vein.       Impella placement as above      Specimen sent to: No specimen collected  Estimated blood loss: 10 cc  Closure:  Sheath left in        [facility]

## 2020-06-17 NOTE — PROGRESS NOTES
[facility] Pharmacy Note:  Renal Dose Adjustment    Casandra Thomas has been prescribed famotidine (PEPCID) 20 mg intravenously OR orally every 12 hours. Estimated Creatinine Clearance: 46.5 mL/min (A) (based on SCr of 1.86 mg/dL (H)).     Her calculated

## 2020-06-17 NOTE — CONSULTS
[facility] AND [facility]  [facility] Cardiology Consult Note    Sharla Rivera Patient Status:  Emergency    2000 MRN [id number]   Location [address] Attending [address]  Brody Yuen Day # 0 PCP Akash Rivera MD, KEILA 0675  Gross per 24 hour   Intake 1000 ml   Output —   Net 1000 ml       Physical Exam:     General: intubated, sedated  HEENT: Normocephalic, anicteric sclera, neck supple. Neck: No JVD, carotids 2+, no bruits. Cardiac: Regular rate and rhythm.  S1, S2 no

## 2020-06-17 NOTE — ANESTHESIA PROCEDURE NOTES
Arterial Line  Performed by: Jeannie Boast, MD  Authorized by: Jeannie Boast, MD     General Information and Staff    Procedure Start:   Anesthesiologist: Alina Jeong MD  Performed By:  Anesthesiologist  Patient Location:  OR  Indication: continuous b

## 2020-06-17 NOTE — CM/SW NOTE
SW received MDO for home health evaluation. Per chart review, pt admitted for SOB, placed on vent support. SW/CM to continue to follow pt case/progress and assist with DC planning pending pt progress and needs.      SW/CM to remain available for suppor

## 2020-06-17 NOTE — ANESTHESIA PREPROCEDURE EVALUATION
Anesthesia PreOp Note    HPI:     Pepe Valencia is a 22year old female who presents for preoperative consultation requested by: Raven Reagan MD    Date of Surgery: 6/17/2020    Procedure(s):   HEART CORONARY ARTERY BYPASS GRAFT  Indication: Daisha Cortes Socioeconomic History      Marital status: Single      Spouse name: Not on file      Number of children: Not on file      Years of education: Not on file      Highest education level: Not on file    Occupational History      Not on file    Social Needs 06/17/2020    CREATSERUM 1.86 (H) 06/17/2020     (H) 06/17/2020    PGLU 124 (H) 06/17/2020    CA 8.6 06/17/2020     Lab Results   Component Value Date    INR 1.17 06/17/2020       Vital Signs: Body mass index is 36.81 kg/m².    height is 1.702 m (5'

## 2020-06-17 NOTE — RESPIRATORY THERAPY NOTE
Upon arrival to ER rm 26 found pt intubated with a 7.5cm ett 23 cm at the lip. Due to respiratory distress, massive PE, unresponsiveness and PEA arrest placed on vent post CPR. Before transport pt was on AC20, 450, 100%,+18 peep.  Transported pt to surgery

## 2020-06-17 NOTE — ANESTHESIA POSTPROCEDURE EVALUATION
Patient: Rick Butler    Procedure Summary     Date:  06/17/20 Room / Location:  [facility] OR  / [facility] OR    Anesthesia Start:  0502 Anesthesia Stop:  8492    Procedure:  HEART CORONARY ARTERY BYPASS GRAFT (N/A ) Diagnosis:       Bleeding

## 2020-06-17 NOTE — ANESTHESIA PROCEDURE NOTES
Arterial Line  Performed by: Radha Martin MD  Authorized by: Radha Martin MD     General Information and Staff    Procedure Start:   Anesthesiologist: Radha Martin MD  Performed By:  Anesthesiologist  Preanesthetic Checklist: 2 patient identifiers,

## 2020-06-18 ENCOUNTER — APPOINTMENT (OUTPATIENT)
Dept: INTERVENTIONAL RADIOLOGY/VASCULAR | Facility: HOSPITAL | Age: 20
DRG: 163 | End: 2020-06-18
Attending: RADIOLOGY
Payer: MEDICAID

## 2020-06-18 ENCOUNTER — HOSPITAL (OUTPATIENT)
Dept: OTHER | Age: 20
End: 2020-06-18
Attending: THORACIC SURGERY (CARDIOTHORACIC VASCULAR SURGERY)

## 2020-06-18 ENCOUNTER — APPOINTMENT (OUTPATIENT)
Dept: CV DIAGNOSTICS | Facility: HOSPITAL | Age: 20
DRG: 163 | End: 2020-06-18
Attending: INTERNAL MEDICINE
Payer: MEDICAID

## 2020-06-18 ENCOUNTER — APPOINTMENT (OUTPATIENT)
Dept: GENERAL RADIOLOGY | Facility: HOSPITAL | Age: 20
DRG: 163 | End: 2020-06-18
Attending: RADIOLOGY
Payer: MEDICAID

## 2020-06-18 ENCOUNTER — APPOINTMENT (OUTPATIENT)
Dept: GENERAL RADIOLOGY | Facility: HOSPITAL | Age: 20
DRG: 163 | End: 2020-06-18
Attending: INTERNAL MEDICINE
Payer: MEDICAID

## 2020-06-18 VITALS
WEIGHT: 235 LBS | DIASTOLIC BLOOD PRESSURE: 71 MMHG | HEART RATE: 128 BPM | HEIGHT: 67 IN | TEMPERATURE: 98 F | OXYGEN SATURATION: 99 % | SYSTOLIC BLOOD PRESSURE: 132 MMHG | BODY MASS INDEX: 36.88 KG/M2 | RESPIRATION RATE: 38 BRPM

## 2020-06-18 LAB
ALBUMIN SERPL-MCNC: 2 G/DL (ref 3.6–5.1)
ALBUMIN/GLOB SERPL: 1 {RATIO} (ref 1–2.4)
ALP SERPL-CCNC: 43 UNITS/L (ref 45–117)
ALP SERPL-CCNC: ABNORMAL U/L
ALT SERPL-CCNC: 173 UNITS/L
ANALYZER ANC (IANC): ABNORMAL
ANION GAP SERPL CALC-SCNC: 19 MMOL/L (ref 10–20)
APTT PPP: 34 SEC (ref 22–32)
APTT PPP: ABNORMAL S
APTT PPP: ABNORMAL S
AST SERPL-CCNC: 1274 UNITS/L
BASE DEFICIT BLDA-SCNC: 3 MMOL/L (ref 0–2)
BASE DEFICIT BLDA-SCNC: 4 MMOL/L (ref 0–2)
BASE DEFICIT BLDA-SCNC: 4 MMOL/L (ref 0–2)
BASE DEFICIT BLDA-SCNC: 6 MMOL/L (ref 0–2)
BASE DEFICIT BLDA-SCNC: ABNORMAL MMOL/L
BASE EXCESS BLDA CALC-SCNC: 1 MMOL/L (ref 0–3)
BASE EXCESS BLDA CALC-SCNC: ABNORMAL MMOL/L
BASOPHILS # BLD: 0 K/MCL (ref 0–0.3)
BASOPHILS NFR BLD: 0 %
BDY SITE: ABNORMAL
BILIRUB SERPL-MCNC: 0.6 MG/DL (ref 0.2–1)
BLOOD BANK CMNT PATIENT-IMP: NORMAL
BLOOD BANK CMNT PATIENT-IMP: NORMAL
BODY TEMPERATURE: 36.4 DEGREES
BODY TEMPERATURE: 37 DEGREES
BUN SERPL-MCNC: 32 MG/DL (ref 6–20)
BUN/CREAT SERPL: 7 (ref 7–25)
CA-I BLD ISE-SCNC: 0.79 MMOL/L (ref 1.15–1.29)
CA-I BLD ISE-SCNC: 0.85 MMOL/L (ref 1.15–1.29)
CA-I BLD ISE-SCNC: 0.86 MMOL/L (ref 1.15–1.29)
CA-I BLD ISE-SCNC: 1.02 MMOL/L (ref 1.15–1.29)
CA-I BLD ISE-SCNC: 1.02 MMOL/L (ref 1.15–1.29)
CA-I BLD ISE-SCNC: 1.09 MMOL/L (ref 1.15–1.29)
CA-I BLD ISE-SCNC: 1.13 MMOL/L (ref 1.15–1.29)
CA-I BLD ISE-SCNC: 1.13 MMOL/L (ref 1.15–1.29)
CA-I BLDA-SCNC: 10 ML/DL (ref 15–23)
CA-I BLDA-SCNC: 11 ML/DL (ref 15–23)
CA-I BLDA-SCNC: 11 ML/DL (ref 15–23)
CA-I BLDA-SCNC: 12 ML/DL (ref 15–23)
CA-I BLDA-SCNC: 12 ML/DL (ref 15–23)
CA-I BLDA-SCNC: 13 % (ref 15–23)
CA-I BLDA-SCNC: 13 ML/DL (ref 15–23)
CA-I BLDA-SCNC: 14 % (ref 15–23)
CALCIUM SERPL-MCNC: 8.2 MG/DL (ref 8.4–10.2)
CHLORIDE SERPL-SCNC: 111 MMOL/L (ref 98–107)
CO2 SERPL-SCNC: 24 MMOL/L (ref 21–32)
COHGB MFR BLD: 0 %
COHGB MFR BLD: 0.1 %
COHGB MFR BLD: 0.2 %
COHGB MFR BLD: 0.3 %
COHGB MFR BLD: 0.4 %
COHGB MFR BLD: 0.4 %
COHGB MFR BLD: 1.2 %
COHGB MFR BLD: 1.3 %
CONDITION-RC: ABNORMAL
CONDITION-RC: ABNORMAL
CONDITION: ABNORMAL
CONDITION: ABNORMAL
CREAT SERPL-MCNC: 4.41 MG/DL (ref 0.51–0.95)
DIFFERENTIAL METHOD BLD: ABNORMAL
EOSINOPHIL # BLD: 0 K/MCL (ref 0.1–0.5)
EOSINOPHIL NFR BLD: 0 %
ERYTHROCYTE [DISTWIDTH] IN BLOOD: 15.9 % (ref 11–15)
FIBRINOGEN PPP-MCNC: 243 MG/DL (ref 190–425)
GLOBULIN SER-MCNC: 2 G/DL (ref 2–4)
GLUCOSE BLD-MCNC: 177 MG/DL (ref 70–99)
GLUCOSE BLD-MCNC: 206 MG/DL (ref 70–99)
GLUCOSE BLD-MCNC: 216 MG/DL (ref 70–99)
GLUCOSE BLD-MCNC: 223 MG/DL (ref 70–99)
GLUCOSE BLD-MCNC: 224 MG/DL (ref 70–99)
GLUCOSE BLD-MCNC: 232 MG/DL (ref 70–99)
GLUCOSE SERPL-MCNC: 205 MG/DL (ref 65–99)
HCO3 BLDA-SCNC: 20 MMOL/L (ref 22–28)
HCO3 BLDA-SCNC: 21 MMOL/L (ref 22–28)
HCO3 BLDA-SCNC: 22 MMOL/L (ref 22–28)
HCO3 BLDA-SCNC: 23 MMOL/L (ref 22–28)
HCO3 BLDA-SCNC: 24 MMOL/L (ref 22–28)
HCT VFR BLD CALC: 24.6 % (ref 36–46.5)
HCT VFR BLD CALC: ABNORMAL %
HGB BLD-MCNC: 6.3 G/DL (ref 12–15.5)
HGB BLD-MCNC: 7.5 G/DL (ref 12–15.5)
HGB BLD-MCNC: 7.5 G/DL (ref 12–15.5)
HGB BLD-MCNC: 7.6 G/DL (ref 12–15.5)
HGB BLD-MCNC: 8.2 G/DL (ref 12–15.5)
HGB BLD-MCNC: 8.3 G/DL (ref 12–15.5)
HGB BLD-MCNC: 8.6 G/DL (ref 12–15.5)
HGB BLD-MCNC: 9 G/DL (ref 12–15.5)
HGB BLD-MCNC: 9.4 G/DL (ref 12–15.5)
HOROWITZ INDEX BLD+IHG-RTO: ABNORMAL MM[HG]
HOROWITZ INDEX BLD+IHG-RTO: ABNORMAL MM[HG]
IMM GRANULOCYTES # BLD AUTO: 0.1 K/MCL (ref 0–0.2)
IMM GRANULOCYTES NFR BLD: 1 %
INR PPP: 0.9
INR PPP: NORMAL
LACTATE BLDA-MCNC: 7.2 MMOL/L
LACTATE BLDA-MCNC: 7.9 MMOL/L
LACTATE BLDA-MCNC: 8 MMOL/L
LACTATE BLDA-MCNC: 8.4 MMOL/L
LACTATE BLDA-MCNC: 8.6 MMOL/L
LACTATE BLDA-MCNC: 8.8 MMOL/L
LACTATE BLDA-MCNC: 9 MMOL/L
LACTATE BLDA-MCNC: 9.3 MMOL/L
LDH SERPL L TO P-CCNC: 1187 UNITS/L (ref 82–240)
LYMPHOCYTES # BLD: 1 K/MCL (ref 1–4.8)
LYMPHOCYTES NFR BLD: 9 %
MAGNESIUM SERPL-MCNC: 1.8 MG/DL (ref 1.7–2.4)
MCH RBC QN AUTO: 29.3 PG (ref 26–34)
MCHC RBC AUTO-ENTMCNC: 33.7 G/DL (ref 32–36.5)
MCV RBC AUTO: 86.9 FL (ref 78–100)
METHGB MFR BLD: 1.4 %
METHGB MFR BLD: 1.5 %
METHGB MFR BLD: 1.6 %
METHGB MFR BLD: 1.6 %
METHGB MFR BLD: 1.7 %
METHGB MFR BLD: 2.2 %
METHGB MFR BLD: 2.3 %
METHGB MFR BLD: 2.6 %
MONOCYTES # BLD: 0.3 K/MCL (ref 0.3–0.9)
MONOCYTES NFR BLD: 3 %
NEUTROPHILS # BLD: 9.3 K/MCL (ref 1.8–7.7)
NEUTROPHILS NFR BLD: 87 %
NEUTS SEG NFR BLD: ABNORMAL %
NRBC (NRBCRE): 0 /100 WBC
NUM BPU REQUESTED: 1
NUM BPU REQUESTED: 4
NUM BPU REQUESTED: 8
OXYHGB MFR BLD: 97 % (ref 94–98)
OXYHGB MFR BLD: 97.1 % (ref 94–98)
OXYHGB MFR BLDA: 96 % (ref 94–98)
OXYHGB MFR BLDA: 96.1 % (ref 94–98)
OXYHGB MFR BLDA: 96.3 % (ref 94–98)
OXYHGB MFR BLDA: 96.9 % (ref 94–98)
OXYHGB MFR BLDA: 97 % (ref 94–98)
OXYHGB MFR BLDA: 97 % (ref 94–98)
PAO2 / FIO2 RATIO (RPFR): ABNORMAL
PAO2 / FIO2 RATIO (RPFR): ABNORMAL
PCO2 BLDA: 32 MM HG (ref 32–45)
PCO2 BLDA: 34 MM HG (ref 32–45)
PCO2 BLDA: 35 MM HG (ref 32–45)
PCO2 BLDA: 36 MM HG (ref 32–45)
PCO2 BLDA: 38 MM HG (ref 32–45)
PCO2 BLDA: 43 MM HG (ref 32–45)
PH BLDA: 7.32 UNITS (ref 7.35–7.45)
PH BLDA: 7.33 UNITS (ref 7.35–7.45)
PH BLDA: 7.38 UNITS (ref 7.35–7.45)
PH BLDA: 7.39 UNITS (ref 7.35–7.45)
PH BLDA: 7.48 UNITS (ref 7.35–7.45)
PHOSPHATE SERPL-MCNC: 6.9 MG/DL (ref 2.4–4.7)
PLATELET # BLD: 153 K/MCL (ref 140–450)
PO2 BLDA: 304 MM HG (ref 83–108)
PO2 BLDA: 307 MM HG (ref 83–108)
PO2 BLDA: 363 MM HG (ref 83–108)
PO2 BLDA: 386 MM HG (ref 83–108)
PO2 BLDA: 391 MM HG (ref 83–108)
PO2 BLDA: 403 MM HG (ref 83–108)
PO2 BLDA: 405 MM HG (ref 83–108)
PO2 BLDA: 407 MM HG (ref 83–108)
POTASSIUM BLD-SCNC: 2.7 MMOL/L (ref 3.4–5.1)
POTASSIUM BLD-SCNC: 3.1 MMOL/L (ref 3.4–5.1)
POTASSIUM BLD-SCNC: 3.2 MMOL/L (ref 3.4–5.1)
POTASSIUM BLD-SCNC: 3.2 MMOL/L (ref 3.4–5.1)
POTASSIUM BLD-SCNC: 3.3 MMOL/L (ref 3.4–5.1)
POTASSIUM BLD-SCNC: 3.4 MMOL/L (ref 3.4–5.1)
POTASSIUM BLD-SCNC: 3.4 MMOL/L (ref 3.4–5.1)
POTASSIUM BLD-SCNC: 3.5 MMOL/L (ref 3.4–5.1)
POTASSIUM SERPL-SCNC: 3.2 MMOL/L (ref 3.4–5.1)
PROT SERPL-MCNC: 4 G/DL (ref 6.4–8.2)
PROTHROMBIN TIME (PRT2): NORMAL
PROTHROMBIN TIME: 9.8 SEC (ref 9.7–11.8)
RBC # BLD: 2.83 MIL/MCL (ref 4–5.2)
SAO2 % BLDA: 100 % (ref 95–99)
SAO2 % BLDA: 100 % (ref 95–99)
SAO2 % BLDA: 98 %
SAO2 % BLDA: 99 %
SERVICE CMNT-IMP: NORMAL
SERVICE CMNT-IMP: NORMAL
SODIUM BLD-SCNC: 146 MMOL/L (ref 135–145)
SODIUM BLD-SCNC: 146 MMOL/L (ref 135–145)
SODIUM BLD-SCNC: 147 MMOL/L (ref 135–145)
SODIUM BLD-SCNC: 147 MMOL/L (ref 135–145)
SODIUM BLD-SCNC: 148 MMOL/L (ref 135–145)
SODIUM BLD-SCNC: 148 MMOL/L (ref 135–145)
SODIUM BLD-SCNC: 149 MMOL/L (ref 135–145)
SODIUM BLD-SCNC: 149 MMOL/L (ref 135–145)
SODIUM SERPL-SCNC: 151 MMOL/L (ref 135–145)
WBC # BLD: 10.7 K/MCL (ref 4.2–11)

## 2020-06-18 PROCEDURE — 93306 TTE W/DOPPLER COMPLETE: CPT | Performed by: INTERNAL MEDICINE

## 2020-06-18 PROCEDURE — 71045 X-RAY EXAM CHEST 1 VIEW: CPT | Performed by: RADIOLOGY

## 2020-06-18 PROCEDURE — 99291 CRITICAL CARE FIRST HOUR: CPT | Performed by: INTERNAL MEDICINE

## 2020-06-18 PROCEDURE — 5A1D90Z PERFORMANCE OF URINARY FILTRATION, CONTINUOUS, GREATER THAN 18 HOURS PER DAY: ICD-10-PCS | Performed by: INTERNAL MEDICINE

## 2020-06-18 PROCEDURE — 02HV33Z INSERTION OF INFUSION DEVICE INTO SUPERIOR VENA CAVA, PERCUTANEOUS APPROACH: ICD-10-PCS | Performed by: RADIOLOGY

## 2020-06-18 PROCEDURE — 71045 X-RAY EXAM CHEST 1 VIEW: CPT | Performed by: INTERNAL MEDICINE

## 2020-06-18 RX ORDER — DOBUTAMINE HYDROCHLORIDE 200 MG/100ML
INJECTION INTRAVENOUS CONTINUOUS PRN
Qty: 1 ML | Refills: 0 | Status: SHIPPED | OUTPATIENT
Start: 2020-06-18

## 2020-06-18 RX ORDER — MILRINONE LACTATE 0.2 MG/ML
0.38 INJECTION, SOLUTION INTRAVENOUS AS NEEDED
Qty: 1 CONTAINER | Refills: 0 | Status: SHIPPED | OUTPATIENT
Start: 2020-06-18

## 2020-06-18 RX ORDER — SODIUM CHLORIDE 9 MG/ML
INJECTION, SOLUTION INTRAVENOUS ONCE
Status: COMPLETED | OUTPATIENT
Start: 2020-06-18 | End: 2020-06-18

## 2020-06-18 RX ORDER — CEFAZOLIN SODIUM/WATER 2 G/20 ML
2 SYRINGE (ML) INTRAVENOUS
Status: DISCONTINUED | OUTPATIENT
Start: 2020-06-19 | End: 2020-06-18

## 2020-06-18 RX ORDER — ACETAMINOPHEN 325 MG/1
650 TABLET ORAL EVERY 6 HOURS PRN
Qty: 1 TABLET | Refills: 0 | Status: SHIPPED | OUTPATIENT
Start: 2020-06-18

## 2020-06-18 RX ORDER — MILRINONE LACTATE 0.2 MG/ML
INJECTION, SOLUTION INTRAVENOUS CONTINUOUS
Qty: 1 CONTAINER | Refills: 0 | Status: SHIPPED | OUTPATIENT
Start: 2020-06-18

## 2020-06-18 RX ORDER — CEFAZOLIN SODIUM/WATER 2 G/20 ML
2 SYRINGE (ML) INTRAVENOUS EVERY 8 HOURS
Qty: 600 ML | Refills: 0 | Status: SHIPPED | OUTPATIENT
Start: 2020-06-18

## 2020-06-18 RX ORDER — CHLORHEXIDINE GLUCONATE 0.12 MG/ML
15 RINSE ORAL 2 TIMES DAILY
Qty: 1 BOTTLE | Refills: 0 | Status: SHIPPED | OUTPATIENT
Start: 2020-06-18

## 2020-06-18 RX ORDER — HEPARIN SODIUM 1000 [USP'U]/ML
INJECTION, SOLUTION INTRAVENOUS; SUBCUTANEOUS
Status: COMPLETED
Start: 2020-06-18 | End: 2020-06-18

## 2020-06-18 RX ORDER — LIDOCAINE HYDROCHLORIDE 20 MG/ML
INJECTION, SOLUTION EPIDURAL; INFILTRATION; INTRACAUDAL; PERINEURAL
Status: COMPLETED
Start: 2020-06-18 | End: 2020-06-18

## 2020-06-18 RX ORDER — ACETAMINOPHEN 10 MG/ML
1000 INJECTION, SOLUTION INTRAVENOUS EVERY 8 HOURS
Qty: 1 VIAL | Refills: 0 | Status: SHIPPED | OUTPATIENT
Start: 2020-06-18

## 2020-06-18 RX ORDER — ASPIRIN 81 MG/1
81 TABLET ORAL DAILY
Qty: 1 TABLET | Refills: 1 | Status: SHIPPED | OUTPATIENT
Start: 2020-06-18

## 2020-06-18 RX ORDER — DEXMEDETOMIDINE HYDROCHLORIDE 4 UG/ML
INJECTION, SOLUTION INTRAVENOUS CONTINUOUS
Qty: 1 ML | Refills: 1 | Status: SHIPPED | OUTPATIENT
Start: 2020-06-18

## 2020-06-18 RX ORDER — DEXTROSE AND SODIUM CHLORIDE 5; .9 G/100ML; G/100ML
INJECTION, SOLUTION INTRAVENOUS CONTINUOUS
Qty: 1 CONTAINER | Refills: 0 | Status: SHIPPED | OUTPATIENT
Start: 2020-06-18

## 2020-06-18 RX ORDER — ALBUMIN, HUMAN INJ 5% 5 %
SOLUTION INTRAVENOUS
Status: COMPLETED
Start: 2020-06-18 | End: 2020-06-18

## 2020-06-18 NOTE — PROGRESS NOTES
[place] [facility] - [facility]    Progress Note    Ricardo Sahu Patient Status:  Inpatient    2000 MRN [id number]   Location [facility] 2W/SW Attending Calista Zafar MD   Hosp Day # 1 PCP Lisa Sol MD, Nitza KELLEY       Subjective: famoTIDine  20 mg Intravenous Daily   • Metoclopramide HCl  5 mg Intravenous Q6H       Continuous Infusions:   • propofol 15 mcg/kg/min (06/18/20 0813)   • NxStage Pure Flow 400 CRRT/PIRRT fluid 5000mL     • midazolam (VERSED) infusion     • insulin regula patient spoke with MOM IR placed Temporary dialysis catheter  Left IJ For CRRT today  Elevated Liver enzymes  shock liver labs tomorrow to trend  Very loose stools rectal tube in Cdiff  labwas sent per night RN  AET 0056 AIC 5.9 will keep on insulin drip t above.    A preliminary report was issued by the [address] [place] [address] Radiology teleradiology service. There are no major discrepancies.    Dictated by (CST): Von Berman MD on 6/18/2020 at 6:45 AM     Finalized by (CST): Von Berman MD on 6/18/2020 at 6:49 AM 6/17/2020  ECG Report  Interpretation  -------------------------- Probable Sinus Tachycardia -Right bundle branch block and right axis -consider right ventricular hypertrophy -consider congenital heart disease or pulmonary disease.  -Anterior ST-elevation

## 2020-06-18 NOTE — PLAN OF CARE
Patient sent to [facility] to be started on ECMO.  Transported via [facility] per Dr. Yue Peck order at [facility]. Also per Dr. Yue Peck patient to be transported off Nitric Oxide and will be restarted at [facility]. Family updated and at bedside fartun Oxygen supplementation based on oxygen saturation or ABGs  - Provide Smoking Cessation handout, if applicable  - Encourage broncho-pulmonary hygiene including cough, deep breathe, Incentive Spirometry  - Assess the need for suctioning and perform as needed range  Description  INTERVENTIONS:  - Monitor Blood Glucose as ordered  - Assess for signs and symptoms of hyperglycemia and hypoglycemia  - Administer ordered medications to maintain glucose within target range  - Assess barriers to adequate nutritional i development  - Assess and document skin integrity  - Assess and document dressing/incision, wound bed, drain sites and surrounding tissue  - Implement wound care per orders  - Initiate isolation precautions as appropriate  - Initiate Pressure Ulcer prevent neurological status  Description  INTERVENTIONS  - Assess for and report changes in neurological status  - Initiate measures to prevent increased intracranial pressure  - Maintain blood pressure and fluid volume within ordered parameters to optimize cerebr consult to assist with strengthening/mobility  - Encourage toileting schedule  Outcome: Progressing     Problem: DISCHARGE PLANNING  Goal: Discharge to home or other facility with appropriate resources  Description  INTERVENTIONS:  - Identify barriers to d

## 2020-06-18 NOTE — PROGRESS NOTES
Pharmacy note: Duplicate Proton Pump Inhibitor with Histamine 2 blocker. Jens Chen is a 22year old female who has been prescribed both Famotidine and Protonix.   Pepcid was discontinued per P&T approved protocol for duplicate therapy in adul

## 2020-06-18 NOTE — PROGRESS NOTES
Patient received on full vent support A/C 20 450 80% +8 with 40ppm LALITA in line and 7.5 ETT taped 23 at the lip. RR increased to 25 per MD order. FiO2 weaned to 70% post am abg. Patient resting comfortably.

## 2020-06-18 NOTE — DIETARY NOTE
Nutrition Education Defer Note     Consult received for diet education per protocol. Education deferred until s/p intervention and when appropriate for teaching. Pt is sedated and intubated at this time.  Plan is for transfer to [facility] when bed becomes ruddy

## 2020-06-18 NOTE — PROGRESS NOTES
[facility]     Progress Note        Karla Monroy Patient Status:  Inpatient    2000 MRN [id number]   Location [facility] 2W/SW Attending Ana Brown MD   Hosp Day # 1 PCP Ariel Cr MD, [address] [place] [address] Hold] dextrose 50 % injection 50 mL, 50 mL, Intravenous, Q15 Min PRN    Or  [MAR Hold] glucose (DEX4) oral liquid 30 g, 30 g, Oral, Q15 Min PRN    Or  [MAR Hold] Glucose-Vitamin C (DEX-4) chewable tab 8 tablet, 8 tablet, Oral, Q15 Min PRN  [MAR Hold] jonathan sulfate (PF) 2 MG/ML injection 2 mg, 2 mg, Intravenous, Q2H PRN    Or  morphINE sulfate (PF) 4 MG/ML injection 4 mg, 4 mg, Intravenous, Q2H PRN    Or  morphINE sulfate (PF) 4 MG/ML injection 8 mg, 8 mg, Intravenous, Q2H PRN  Dexmedetomidine HCl in NaCl (DC Pure Flow 400 CRRT/PIRRT fluid 5000mL 5 L/hr (06/18/20 0815)   • midazolam (VERSED) infusion     • insulin regular 7 Units/hr (06/18/20 1100)   • DOBUTamine 9 mcg/kg/min (06/18/20 1000)   • Nitroglycerin in D5W     • norepinephrine Stopped (06/18/20 0500) Mahesh Thornton MD on 6/18/2020 at 9:15 AM     Finalized by (CST): Mahesh Thornton MD on 6/18/2020 at 9:20 AM          Xr Chest Ap Portable  (cpt=71045)    Result Date: 6/18/2020  CONCLUSION:  1. Mild right perihilar opacification, unchanged.     Dictat Jaime Young MD on 6/17/2020 at 3:41 PM          Xr Abdomen 2 Views(cpt=74019)    Result Date: 6/17/2020  CONCLUSION:  * Postop changes are noted. * Small radiopaque densities are seen in the right femoral region. Significance?  * Otherwise no unexpected radiopa insertion of right ventricular assist device on 6/17/2020. Underwent mediastinal exploration later on 6/17/2020 with some evidence of postoperative hemorrhage.   Plan for washout in the operating room tomorrow.  -Pressor requirements and inotropic requirem

## 2020-06-18 NOTE — CONSULTS
[facility]    Report of Consultation    Janet Capellan Patient Status:  Inpatient    2000 MRN [id number]   Location [facility] 2W/SW Attending Brigida Hebert MD   Hosp Day # 1 PCP Joe Zafar MD, Iona Bobo     Date o 100 mL infusion, 0.02-0.1 mg/kg/hr, Intravenous, Continuous  Insulin Regular Human (NOVOLIN R) 100 Units in sodium chloride 0.9% 100 mL infusion, 1-28 Units/hr, Intravenous, Continuous  [MAR Hold] glucose (DEX4) oral liquid 15 g, 15 g, Oral, Q15 Min PRN multivitamin (ADULT) tab 1 tablet, 1 tablet, Oral, Daily  [MAR Hold] Vitamin C tab 500 mg, 500 mg, Oral, TID  mupirocin (BACTROBAN) 2% nasal ointment OINT 1 Application, 1 Application, Nasal, BID  Chlorhexidine Gluconate (PERIDEX) 0.12 % solution 15 mL, 15 Oral, Daily    Or  famoTIDine (PEPCID) injection 20 mg, 20 mg, Intravenous, Daily  Metoclopramide HCl (REGLAN) injection 5 mg, 5 mg, Intravenous, Q6H  EPINEPHrine HCl (ADRENALIN) 4 mg in sodium chloride 0.9% 250 mL infusion, 1-10 mcg/min, Intravenous, Cont 1.63 (H) 06/18/2020    PTP 19.0 (H) 06/18/2020     04/02/2020    DDIMER >20.00 (H) 06/17/2020    CRP 2.35 (H) 06/17/2020    MG 1.8 06/18/2020    TROP 0.173 (HH) 06/17/2020    CK 62 06/17/2020         Imaging:  Xr Chest Ap Portable  (cpt=71045)    Re Dictated by (CST): John Ferreira MD on 6/17/2020 at 3:40 PM     Finalized by (CST): John Ferreira MD on 6/17/2020 at 3:41 PM          Xr Abdomen 2 Views(cpt=74019)    Result Date: 6/17/2020  CONCLUSION:  * Postop changes are noted.  * Small radiopaq

## 2020-06-18 NOTE — PLAN OF CARE
[facility] dialysis notified about orders for stat CRRT at bedside , will dispatch

## 2020-06-18 NOTE — PLAN OF CARE
Going back to OR due to continued bleeding after multiple blood products.  Family at bedside and aware of situation, discussed with MD. Mother to stay in the room, father chose to leave with understanding that he will not be able to come back to unit once h

## 2020-06-18 NOTE — ANESTHESIA PREPROCEDURE EVALUATION
Anesthesia PreOp Note    HPI:     Rosa Cam is a 22year old female who presents for preoperative consultation requested by: Martínez Burns MD    Date of Surgery: 6/17/2020    Procedure(s):  MEDIASTINAL EXPLORATION  Indication: Pulmonary embolism M, NP    Or  Glucose-Vitamin C (DEX-4) chewable tab 4 tablet, 4 tablet, Oral, Q15 Min PRN, Mary Lima, NP    Or  dextrose 50 % injection 50 mL, 50 mL, Intravenous, Q15 Min PRN, Mary Lima, NP    Or  glucose (DEX4) oral liquid 30 g, 30 g, Oral, Enrique Caban, TORI  potassium chloride IVPB premix 20 mEq, 20 mEq, Intravenous, PRN, Enrique Lima, NP    Or  potassium chloride IVPB premix 40 mEq, 40 mEq, Intravenous, PRN, Enrique Lima, NP  Magnesium Sulfate in D5W IVPB premix 1 g, 1 g, Intravenous, PRN syringe 2 g, 2 g, Intravenous, Q8H, Jez Lima, NP  [START ON 6/18/2020] Senna (SENOKOT) tab 8.6 mg, 8.6 mg, Oral, BID, Jez Lima, NP  sodium chloride 0.9 % irrigation, , , Continuous PRN, Theresa Farmer MD, 3,000 mL at 06/17/20 Mariela  milrinone [facility]-ordered outpatient medications on file. No Active Allergies    No family history on file.   Social History    Socioeconomic History      Marital status: Single      Spouse name: Not on file      Number of children: Not on file      Years of educat Results   Component Value Date     (H) 06/17/2020    K 3.5 06/17/2020     (H) 06/17/2020    CO2 21.0 06/17/2020    BUN 19 (H) 06/17/2020    CREATSERUM 2.81 (H) 06/17/2020     (H) 06/17/2020    PGLU 206 (H) 06/17/2020    CA 10.2 (H) 06/17

## 2020-06-18 NOTE — PLAN OF CARE
Received pt mechanically ventilated with settings of AC 16/500/+8+70%. Size 7.5 ETT secured @ lips. Nitric being administered at this time 40ppm Suctioned small white secretions. ABG drawn.     Pt pending transfer to [facility] for further tx    Respir

## 2020-06-18 NOTE — PROGRESS NOTES
[facility] Pharmacy Note: Antimicrobial Weight Based Dose Adjustment for: piperacillin/tazobactam (Oanh Bradley)    Tiana Lucero is a 22year old female who has been prescribed piperacillin/tazobactam (ZOSYN) 3.375g every 8 hours.       Pt currently on [employer]

## 2020-06-18 NOTE — PLAN OF CARE
Updated Dr Yadav Grew, repeat ABG, increase PEEP to 8 and wean fio2 to 80%; ordered to give 2 amps bicarb, replace K of 2.7

## 2020-06-18 NOTE — PROGRESS NOTES
[facility] AND [facility]  MHS/AMG Cardiology Progress Note    Dahlia Nguyen Patient Status:  Inpatient    2000 MRN [id number]   Location [facility] 2W/SW Attending Arjun Santos MD   Hosp Day # 1 PCP Kendrick Marroquin MD, [facility]     21 yo f on file. reports that she has never smoked. She has never used smokeless tobacco. She reports previous alcohol use. She reports that she does not use drugs.     Objective:   Temp: 98.4 °F (36.9 °C)  Pulse: 117  Resp: 28  BP: 146/48  FiO2 (%): 70 %    [place]

## 2020-06-18 NOTE — CM/SW NOTE
1500:Addendum    [facility] here to transport patient to [facility]. Patient will be going directly to the OR at [facility]. PCS form Completed and placed in [address] [place] [address] for Printing.    [facility] transfer center [phone] ,   inform

## 2020-06-18 NOTE — PROGRESS NOTES
[place] [facility] - [facility]    Brief Note    Sravani King Patient Status:  Inpatient    2000 MRN [id number]   Location [facility] 2W/SW Attending Corie Zaldivar MD   Hosp Day # 1 PCP Shari Campbell MD, Ebenezer Gomez     Date of Note:

## 2020-06-18 NOTE — CONSULTS
Ita Chen  9/16/2000    Reason for consult: RV shock      HPI:    22year old female who was admitted to the hospital for acute Pulmonary embolism. Seen in ER initially on 6/16/20 for UTI & started on Bactrim.  She returned 6/17 with acute respi abused: Not on file        Physically abused: Not on file        Forced sexual activity: Not on file    Other Topics      Concerns:        Not on file    Social History Narrative      Not on file        Review of Systems:    Constitutional: No fevers, chil 06/18/2020    BUN 32 06/18/2020     06/18/2020    K 3.6 06/18/2020     06/18/2020    CO2 32.0 06/18/2020     06/18/2020    CA 7.9 06/18/2020    ALB 2.4 06/18/2020    ALKPHO 56 06/18/2020    BILT 1.0 06/18/2020    TP 5.0 06/18/2020    AST Date: 6/18/2020  CONCLUSION:  1. Mild right perihilar opacification, unchanged.     Dictated by (CST): Markel Ennis MD on 6/18/2020 at 7:25 AM     Finalized by (CST): Markel Ennis MD on 6/18/2020 at 7:29 AM          Xr Chest Ap Portabl Catheter    Result Date: 6/18/2020  CONCLUSION:  1.  Ultrasound-guided placement of temporary [place] dialysis catheter    Dictated by (CST): Tri Lam MD on 6/18/2020 at 9:35 AM     Finalized by (CST): Tri Lam MD on 6/18/2020 at 9:37 AM

## 2020-06-18 NOTE — IVS NOTE
Patient in IR for US Guided temp dialysis cath placement- left completed in patient rm 233. Timeout/universal protocol completed. 5 ml of 2% lidocaine given by Car Ludwig MD to left neck. Patient monitored, vented. Patient tolerated procedure well.   Report

## 2020-06-18 NOTE — OPERATIVE REPORT
[facility] OPERATING ROOM  Operative Note     Panchito Mckenna Location: OR   [facility] [id number] MRN [id number]   Admission Date 6/17/2020 Operation Date 6/17/2020   Attending Physician Love Vargas MD Operating Physician Jan Cano MD      Pr then packed with 2 Kerlex. An Esmark dressing was stapled to the skin edges and the wound covered with an [place] dressing. The patient tolerated the procedure well and was transported back to the ICU in stable but critical condition.       Johnna Canas MD

## 2020-06-18 NOTE — ANESTHESIA POSTPROCEDURE EVALUATION
Patient: Rosa Cam    Procedure Summary     Date:  06/17/20 Room / Location:  [facility] OR  / [facility] OR    Anesthesia Start:  1946 Anesthesia Stop:      Procedure:  MEDIASTINAL EXPLORATION (N/A ) Diagnosis:       Pulmonary embolism and infar

## 2020-06-18 NOTE — PROGRESS NOTES
[facility] Pharmacy Note:  Renal Dose Adjustment (CRRT)    Sary Shaw has been prescribed cefazolin (ANCEF) every 8 hours. Pharmacy has been asked to review medications for appropriateness for CRRT. Renal Replacement fluid rate is 5 L/hr.     Judd Olguin

## 2020-06-18 NOTE — PROGRESS NOTES
[facility] Pharmacy Note:  Renal Dose Adjustment for Metoclopramide (REGLAN)    Dahlia Nguyen has been prescribed Metoclopramide (REGLAN) 10 mg every 6 hours as needed for N/V.     Estimated Creatinine Clearance: 30.8 mL/min (A) (based on SCr of 2.81 mg/dL

## 2020-06-18 NOTE — PLAN OF CARE
Received from OR at 1520. Unresponsive to painful stimulus. No sedation on board from OR. Pupils 5 brisk. Vented on 100% FIO2, ETT 23cm at lip. Sats % lungs dimished. ABG drawn and bicarb x4. CT placed to 20cm suction. No leak noted.  Draining sangui

## 2020-06-19 ENCOUNTER — CASE MANAGEMENT (OUTPATIENT)
Dept: CARE COORDINATION | Age: 20
End: 2020-06-19

## 2020-06-19 ENCOUNTER — HOSPITAL (OUTPATIENT)
Dept: OTHER | Age: 20
End: 2020-06-19

## 2020-06-19 ENCOUNTER — DIAGNOSTIC TRANS (OUTPATIENT)
Dept: OTHER | Age: 20
End: 2020-06-19

## 2020-06-19 ENCOUNTER — HOSPITAL (OUTPATIENT)
Dept: OTHER | Age: 20
End: 2020-06-19
Attending: THORACIC SURGERY (CARDIOTHORACIC VASCULAR SURGERY)

## 2020-06-19 LAB
ALBUMIN SERPL-MCNC: 2 G/DL (ref 3.6–5.1)
ALBUMIN/GLOB SERPL: 1.1 {RATIO} (ref 1–2.4)
ALP SERPL-CCNC: 40 UNITS/L (ref 45–117)
ALP SERPL-CCNC: ABNORMAL U/L
ALT SERPL-CCNC: 149 UNITS/L
ANALYZER ANC (IANC): ABNORMAL
ANION GAP SERPL CALC-SCNC: 12 MMOL/L (ref 10–20)
ANION GAP SERPL CALC-SCNC: 12 MMOL/L (ref 10–20)
ANION GAP SERPL CALC-SCNC: 14 MMOL/L (ref 10–20)
APTT PPP: 33 SEC (ref 22–32)
APTT PPP: ABNORMAL S
APTT PPP: ABNORMAL S
AST SERPL-CCNC: 1105 UNITS/L
BASE DEFICIT BLDA-SCNC: ABNORMAL MMOL/L
BASE EXCESS BLDA CALC-SCNC: 0 MMOL/L (ref 0–3)
BASE EXCESS BLDA CALC-SCNC: 1 MMOL/L (ref 0–3)
BASE EXCESS BLDA CALC-SCNC: 2 MMOL/L (ref 0–3)
BASE EXCESS BLDA CALC-SCNC: 2 MMOL/L (ref 0–3)
BASE EXCESS BLDA CALC-SCNC: 3 MMOL/L (ref 0–3)
BASE EXCESS BLDA CALC-SCNC: 3 MMOL/L (ref 0–3)
BASOPHILS # BLD: 0 K/MCL (ref 0–0.3)
BASOPHILS NFR BLD: 0 %
BDY SITE: ABNORMAL
BILIRUB SERPL-MCNC: 0.8 MG/DL (ref 0.2–1)
BLD PROD TYP BPU: NORMAL
BLD UNIT ID BPU: NORMAL
BLOOD BANK CMNT PATIENT-IMP: NORMAL
BODY TEMPERATURE: 36.2 DEGREES
BODY TEMPERATURE: 36.2 DEGREES
BODY TEMPERATURE: 36.6 DEGREES
BODY TEMPERATURE: 36.6 DEGREES
BODY TEMPERATURE: 36.7 DEGREES
BODY TEMPERATURE: 37 DEGREES
BODY TEMPERATURE: 37 DEGREES
BUN SERPL-MCNC: 26 MG/DL (ref 6–20)
BUN SERPL-MCNC: 27 MG/DL (ref 6–20)
BUN SERPL-MCNC: 30 MG/DL (ref 6–20)
BUN/CREAT SERPL: 6 (ref 7–25)
BUN/CREAT SERPL: 6 (ref 7–25)
BUN/CREAT SERPL: 7 (ref 7–25)
CA-I BLD ISE-SCNC: 0.98 MMOL/L (ref 1.15–1.29)
CA-I BLD ISE-SCNC: 0.99 MMOL/L (ref 1.15–1.29)
CA-I BLD ISE-SCNC: 1 MMOL/L (ref 1.15–1.29)
CA-I BLD ISE-SCNC: 1.02 MMOL/L (ref 1.15–1.29)
CA-I BLD ISE-SCNC: 1.04 MMOL/L (ref 1.15–1.29)
CA-I BLD ISE-SCNC: 1.05 MMOL/L (ref 1.15–1.29)
CA-I BLD ISE-SCNC: 1.08 MMOL/L (ref 1.15–1.29)
CA-I BLD ISE-SCNC: 1.08 MMOL/L (ref 1.15–1.29)
CA-I BLDA-SCNC: 13 % (ref 15–23)
CA-I BLDA-SCNC: 13 % (ref 15–23)
CA-I BLDA-SCNC: 14 % (ref 15–23)
CA-I BLDA-SCNC: 16 % (ref 15–23)
CA-I BLDA-SCNC: 16 ML/DL (ref 15–23)
CALCIUM SERPL-MCNC: 7 MG/DL (ref 8.4–10.2)
CALCIUM SERPL-MCNC: 7 MG/DL (ref 8.4–10.2)
CALCIUM SERPL-MCNC: 7.5 MG/DL (ref 8.4–10.2)
CHLORIDE SERPL-SCNC: 110 MMOL/L (ref 98–107)
CHLORIDE SERPL-SCNC: 112 MMOL/L (ref 98–107)
CHLORIDE SERPL-SCNC: 113 MMOL/L (ref 98–107)
CK SERPL-CCNC: 6408 UNITS/L (ref 26–192)
CO2 SERPL-SCNC: 25 MMOL/L (ref 21–32)
CO2 SERPL-SCNC: 26 MMOL/L (ref 21–32)
CO2 SERPL-SCNC: 27 MMOL/L (ref 21–32)
COHGB MFR BLD: 0.6 %
COHGB MFR BLD: 0.9 %
COHGB MFR BLD: 1.1 %
COHGB MFR BLD: 1.2 %
COHGB MFR BLD: 1.4 %
COHGB MFR BLD: 1.5 %
CONDITION, POC: ABNORMAL
CONDITION-RC: ABNORMAL
CONDITION: ABNORMAL
CREAT SERPL-MCNC: 4.12 MG/DL (ref 0.51–0.95)
CREAT SERPL-MCNC: 4.38 MG/DL (ref 0.51–0.95)
CREAT SERPL-MCNC: 4.53 MG/DL (ref 0.51–0.95)
CRP SERPL-MCNC: 8.2 MG/DL
D DIMER PPP FEU-MCNC: 9.95 MG/L (FEU)
D DIMER PPP FEU-MCNC: ABNORMAL
DIFFERENTIAL METHOD BLD: ABNORMAL
EOSINOPHIL # BLD: 0 K/MCL (ref 0.1–0.5)
EOSINOPHIL NFR BLD: 0 %
ERYTHROCYTE [DISTWIDTH] IN BLOOD: 14.9 % (ref 11–15)
FERRITIN SERPL-MCNC: 755 NG/ML (ref 8–252)
FIBRINOGEN PPP-MCNC: 274 MG/DL (ref 190–425)
GLOBULIN SER-MCNC: 1.9 G/DL (ref 2–4)
GLUCOSE BLD-MCNC: 142 MG/DL (ref 70–99)
GLUCOSE BLDC GLUCOMTR-MCNC: 115 MG/DL (ref 70–99)
GLUCOSE BLDC GLUCOMTR-MCNC: 124 MG/DL (ref 70–99)
GLUCOSE BLDC GLUCOMTR-MCNC: 126 MG/DL (ref 70–99)
GLUCOSE BLDC GLUCOMTR-MCNC: 128 MG/DL (ref 70–99)
GLUCOSE BLDC GLUCOMTR-MCNC: 142 MG/DL (ref 70–99)
GLUCOSE BLDC GLUCOMTR-MCNC: 162 MG/DL (ref 70–99)
GLUCOSE BLDC GLUCOMTR-MCNC: 177 MG/DL (ref 70–99)
GLUCOSE BLDC GLUCOMTR-MCNC: 184 MG/DL (ref 70–99)
GLUCOSE BLDC GLUCOMTR-MCNC: 195 MG/DL (ref 70–99)
GLUCOSE BLDC GLUCOMTR-MCNC: 92 MG/DL (ref 70–99)
GLUCOSE BLDC GLUCOMTR-MCNC: 93 MG/DL (ref 70–99)
GLUCOSE BLDC GLUCOMTR-MCNC: ABNORMAL MG/DL
GLUCOSE SERPL-MCNC: 126 MG/DL (ref 65–99)
GLUCOSE SERPL-MCNC: 135 MG/DL (ref 65–99)
GLUCOSE SERPL-MCNC: 162 MG/DL (ref 65–99)
HCO3 BLDA-SCNC: 25 MMOL/L (ref 22–28)
HCO3 BLDA-SCNC: 26 MMOL/L (ref 22–28)
HCO3 BLDA-SCNC: 26 MMOL/L (ref 22–28)
HCO3 BLDA-SCNC: 27 MMOL/L (ref 22–28)
HCT VFR BLD CALC: 27.4 % (ref 36–46.5)
HCT VFR BLD CALC: 28.2 % (ref 36–46.5)
HCT VFR BLD CALC: 30.2 % (ref 36–46.5)
HCT VFR BLD CALC: ABNORMAL %
HGB BLD-MCNC: 10 G/DL (ref 12–15.5)
HGB BLD-MCNC: 10.6 G/DL (ref 12–15.5)
HGB BLD-MCNC: 10.7 G/DL (ref 12–15.5)
HGB BLD-MCNC: 11 G/DL (ref 12–15.5)
HGB BLD-MCNC: 8.9 G/DL (ref 12–15.5)
HGB BLD-MCNC: 9.1 G/DL (ref 12–15.5)
HGB BLD-MCNC: 9.2 G/DL (ref 12–15.5)
HGB BLD-MCNC: 9.5 G/DL (ref 12–15.5)
HGB BLD-MCNC: 9.6 G/DL (ref 12–15.5)
HGB BLD-MCNC: 9.7 G/DL (ref 12–15.5)
HGB BLD-MCNC: 9.7 G/DL (ref 12–15.5)
HOROWITZ INDEX BLD+IHG-RTO: ABNORMAL MM[HG]
IMM GRANULOCYTES # BLD AUTO: 0.1 K/MCL (ref 0–0.2)
IMM GRANULOCYTES NFR BLD: 1 %
INR PPP: 1
INR PPP: NORMAL
ISOLATION GUIDELINES: ABNORMAL
LACTATE BLDA-MCNC: 1.9 MMOL/L
LACTATE BLDA-MCNC: 2 MMOL/L
LACTATE BLDA-MCNC: 2.2 MMOL/L
LACTATE BLDA-MCNC: 2.3 MMOL/L
LACTATE BLDA-MCNC: 2.5 MMOL/L
LACTATE BLDA-MCNC: 4.2 MMOL/L
LACTATE BLDA-MCNC: 4.9 MMOL/L
LACTATE BLDA-MCNC: 6.2 MMOL/L
LDH SERPL L TO P-CCNC: 1181 UNITS/L (ref 82–240)
LYMPHOCYTES # BLD: 1 K/MCL (ref 1–4.8)
LYMPHOCYTES NFR BLD: 9 %
MAGNESIUM SERPL-MCNC: 1.6 MG/DL (ref 1.7–2.4)
MAGNESIUM SERPL-MCNC: 2.1 MG/DL (ref 1.7–2.4)
MAGNESIUM SERPL-MCNC: 2.2 MG/DL (ref 1.7–2.4)
MCH RBC QN AUTO: 29.2 PG (ref 26–34)
MCHC RBC AUTO-ENTMCNC: 34.7 G/DL (ref 32–36.5)
MCV RBC AUTO: 84.3 FL (ref 78–100)
METHGB MFR BLD: 1.2 %
METHGB MFR BLD: 1.3 %
METHGB MFR BLD: 1.4 %
METHGB MFR BLD: 1.6 %
METHGB MFR BLD: 1.7 %
METHGB MFR BLD: 2 %
MONOCYTES # BLD: 0.3 K/MCL (ref 0.3–0.9)
MONOCYTES NFR BLD: 3 %
MRSA DNA SPEC QL NAA+PROBE: NORMAL
MRSA DNA SPEC QL NAA+PROBE: NOT DETECTED
NEUTROPHILS # BLD: 9.9 K/MCL (ref 1.8–7.7)
NEUTROPHILS NFR BLD: 87 %
NEUTS SEG NFR BLD: ABNORMAL %
NRBC (NRBCRE): 0 /100 WBC
NT-PROBNP SERPL-MCNC: ABNORMAL PG/ML
NUM BPU REQUESTED: 1
NUM BPU REQUESTED: 2
NUM BPU REQUESTED: 2
NUM BPU REQUESTED: 4
NUM BPU REQUESTED: 5
OXYHGB MFR BLD: 96.9 % (ref 94–98)
OXYHGB MFR BLD: 97 % (ref 94–98)
OXYHGB MFR BLD: 97.1 % (ref 94–98)
OXYHGB MFR BLD: 97.2 % (ref 94–98)
OXYHGB MFR BLD: 97.4 % (ref 94–98)
OXYHGB MFR BLD: 97.4 % (ref 94–98)
OXYHGB MFR BLD: 97.5 % (ref 94–98)
OXYHGB MFR BLDA: 97.5 % (ref 94–98)
PAO2 / FIO2 RATIO (RPFR): ABNORMAL
PCO2 BLDA: 33 MM HG (ref 32–45)
PCO2 BLDA: 35 MM HG (ref 32–45)
PCO2 BLDA: 35 MM HG (ref 32–45)
PCO2 BLDA: 36 MM HG (ref 32–45)
PCO2 BLDA: 37 MM HG (ref 32–45)
PCO2 BLDA: 37 MM HG (ref 32–45)
PH BLDA: 7.43 UNITS (ref 7.35–7.45)
PH BLDA: 7.44 UNITS (ref 7.35–7.45)
PH BLDA: 7.44 UNITS (ref 7.35–7.45)
PH BLDA: 7.46 UNITS (ref 7.35–7.45)
PH BLDA: 7.46 UNITS (ref 7.35–7.45)
PH BLDA: 7.48 UNITS (ref 7.35–7.45)
PH BLDA: 7.48 UNITS (ref 7.35–7.45)
PH BLDA: 7.49 UNITS (ref 7.35–7.45)
PHOSPHATE SERPL-MCNC: 3.5 MG/DL (ref 2.4–4.7)
PLATELET # BLD: 112 K/MCL (ref 140–450)
PO2 BLDA: 248 MM HG (ref 83–108)
PO2 BLDA: 251 MM HG (ref 83–108)
PO2 BLDA: 255 MM HG (ref 83–108)
PO2 BLDA: 260 MM HG (ref 83–108)
PO2 BLDA: 281 MM HG (ref 83–108)
PO2 BLDA: 339 MM HG (ref 83–108)
PO2 BLDA: 377 MM HG (ref 83–108)
PO2 BLDA: 499 MM HG (ref 83–108)
POTASSIUM BLD-SCNC: 3.4 MMOL/L (ref 3.4–5.1)
POTASSIUM BLD-SCNC: 3.5 MMOL/L (ref 3.4–5.1)
POTASSIUM BLD-SCNC: 3.8 MMOL/L (ref 3.4–5.1)
POTASSIUM BLD-SCNC: 4.2 MMOL/L (ref 3.4–5.1)
POTASSIUM BLD-SCNC: 4.3 MMOL/L (ref 3.4–5.1)
POTASSIUM BLD-SCNC: 4.4 MMOL/L (ref 3.4–5.1)
POTASSIUM BLD-SCNC: 4.4 MMOL/L (ref 3.4–5.1)
POTASSIUM BLD-SCNC: 4.5 MMOL/L (ref 3.4–5.1)
POTASSIUM SERPL-SCNC: 3.5 MMOL/L (ref 3.4–5.1)
POTASSIUM SERPL-SCNC: 4.3 MMOL/L (ref 3.4–5.1)
POTASSIUM SERPL-SCNC: 4.5 MMOL/L (ref 3.4–5.1)
PROCALCITONIN SERPL IA-MCNC: 110.06 NG/ML
PROCALCITONIN SERPL IA-MCNC: ABNORMAL NG/ML
PROT SERPL-MCNC: 3.9 G/DL (ref 6.4–8.2)
PROTHROMBIN TIME (PRT2): NORMAL
PROTHROMBIN TIME: 10.6 SEC (ref 9.7–11.8)
RBC # BLD: 3.25 MIL/MCL (ref 4–5.2)
SAO2 % BLDA: 100 %
SAO2 % BLDA: 100 % (ref 95–99)
SARS-COV-2 RNA RESP QL NAA+PROBE: DETECTED
SERVICE CMNT-IMP: NORMAL
SODIUM BLD-SCNC: 141 MMOL/L (ref 135–145)
SODIUM BLD-SCNC: 142 MMOL/L (ref 135–145)
SODIUM BLD-SCNC: 142 MMOL/L (ref 135–145)
SODIUM BLD-SCNC: 143 MMOL/L (ref 135–145)
SODIUM BLD-SCNC: 144 MMOL/L (ref 135–145)
SODIUM BLD-SCNC: 145 MMOL/L (ref 135–145)
SODIUM BLD-SCNC: 146 MMOL/L (ref 135–145)
SODIUM BLD-SCNC: 146 MMOL/L (ref 135–145)
SODIUM SERPL-SCNC: 143 MMOL/L (ref 135–145)
SODIUM SERPL-SCNC: 147 MMOL/L (ref 135–145)
SODIUM SERPL-SCNC: 149 MMOL/L (ref 135–145)
SPECIMEN SOURCE: ABNORMAL
SPECIMEN SOURCE: NORMAL
STATUS OF UNIT: NORMAL
TRANSFUSION STATUS: NORMAL
UNIT DIVISION: 0
UNIT DIVISION: NORMAL
UNIT DIVISION: NORMAL
WBC # BLD: 11.4 K/MCL (ref 4.2–11)

## 2020-06-19 NOTE — RESPIRATORY THERAPY NOTE
RESPIRATORY THERAPY PATIENT TRANSPORT NOTE    Transported from:  To and from OR  Transported to:       Patient is intubated?:yes  Transport ventilator used?:no  Resuscitation bag used during transport?:yes   ETT placement and ventilator function were

## 2020-06-20 LAB
ALBUMIN SERPL-MCNC: 2.4 G/DL (ref 3.6–5.1)
ALBUMIN/GLOB SERPL: 1.1 {RATIO} (ref 1–2.4)
ALP SERPL-CCNC: 43 UNITS/L (ref 45–117)
ALP SERPL-CCNC: ABNORMAL U/L
ALT SERPL-CCNC: 98 UNITS/L
ANALYZER ANC (IANC): ABNORMAL
ANALYZER ANC (IANC): ABNORMAL
ANION GAP SERPL CALC-SCNC: 10 MMOL/L (ref 10–20)
ANION GAP SERPL CALC-SCNC: 12 MMOL/L (ref 10–20)
ANION GAP SERPL CALC-SCNC: 12 MMOL/L (ref 10–20)
APTT PPP: 26 SEC (ref 22–32)
APTT PPP: 31 SEC (ref 22–32)
APTT PPP: NORMAL S
AST SERPL-CCNC: 507 UNITS/L
BASE DEFICIT BLDA-SCNC: 0 MMOL/L (ref 0–2)
BASE DEFICIT BLDA-SCNC: 1 MMOL/L (ref 0–2)
BASE DEFICIT BLDA-SCNC: ABNORMAL MMOL/L
BASE DEFICIT BLDA-SCNC: ABNORMAL MMOL/L
BASE EXCESS BLDA CALC-SCNC: 1 MMOL/L (ref 0–3)
BASE EXCESS BLDA CALC-SCNC: 2 MMOL/L (ref 0–3)
BASE EXCESS BLDA CALC-SCNC: ABNORMAL MMOL/L
BASOPHILS # BLD: 0 K/MCL (ref 0–0.3)
BASOPHILS # BLD: 0 K/MCL (ref 0–0.3)
BASOPHILS NFR BLD: 0 %
BASOPHILS NFR BLD: 0 %
BDY SITE: ABNORMAL
BILIRUB SERPL-MCNC: 1.3 MG/DL (ref 0.2–1)
BLOOD BANK CMNT PATIENT-IMP: NORMAL
BLOOD BANK CMNT PATIENT-IMP: NORMAL
BODY TEMPERATURE: 36.7 DEGREES
BODY TEMPERATURE: 36.7 DEGREES
BODY TEMPERATURE: 36.8 DEGREES
BODY TEMPERATURE: 36.8 DEGREES
BODY TEMPERATURE: 36.9 DEGREES
BUN SERPL-MCNC: 29 MG/DL (ref 6–20)
BUN SERPL-MCNC: 32 MG/DL (ref 6–20)
BUN SERPL-MCNC: 32 MG/DL (ref 6–20)
BUN/CREAT SERPL: 7 (ref 7–25)
CA-I BLD ISE-SCNC: 0.95 MMOL/L (ref 1.15–1.29)
CA-I BLD ISE-SCNC: 0.99 MMOL/L (ref 1.15–1.29)
CA-I BLD ISE-SCNC: 1.01 MMOL/L (ref 1.15–1.29)
CA-I BLD ISE-SCNC: 1.01 MMOL/L (ref 1.15–1.29)
CA-I BLD ISE-SCNC: 1.03 MMOL/L (ref 1.15–1.29)
CA-I BLD ISE-SCNC: 1.07 MMOL/L (ref 1.15–1.29)
CA-I BLDA-SCNC: 14 % (ref 15–23)
CA-I BLDA-SCNC: 15 % (ref 15–23)
CA-I BLDA-SCNC: 15 ML/DL (ref 15–23)
CA-I BLDA-SCNC: 17 % (ref 15–23)
CALCIUM SERPL-MCNC: 6.6 MG/DL (ref 8.4–10.2)
CALCIUM SERPL-MCNC: 7.1 MG/DL (ref 8.4–10.2)
CALCIUM SERPL-MCNC: 7.3 MG/DL (ref 8.4–10.2)
CHLORIDE SERPL-SCNC: 109 MMOL/L (ref 98–107)
CHLORIDE SERPL-SCNC: 109 MMOL/L (ref 98–107)
CHLORIDE SERPL-SCNC: 110 MMOL/L (ref 98–107)
CK SERPL-CCNC: 6302 UNITS/L (ref 26–192)
CO2 SERPL-SCNC: 25 MMOL/L (ref 21–32)
CO2 SERPL-SCNC: 26 MMOL/L (ref 21–32)
CO2 SERPL-SCNC: 26 MMOL/L (ref 21–32)
COHGB MFR BLD: 0.4 %
COHGB MFR BLD: 0.7 %
COHGB MFR BLD: 0.8 %
COHGB MFR BLD: 0.8 %
COHGB MFR BLD: 1 %
COHGB MFR BLD: 1.1 %
CONDITION, POC: ABNORMAL
CONDITION-RC: ABNORMAL
CONDITION: ABNORMAL
CREAT SERPL-MCNC: 4.03 MG/DL (ref 0.51–0.95)
CREAT SERPL-MCNC: 4.28 MG/DL (ref 0.51–0.95)
CREAT SERPL-MCNC: 4.42 MG/DL (ref 0.51–0.95)
CRP SERPL-MCNC: 11 MG/DL
D DIMER PPP FEU-MCNC: 27.42 MG/L (FEU)
D DIMER PPP FEU-MCNC: ABNORMAL
DIFFERENTIAL METHOD BLD: ABNORMAL
DIFFERENTIAL METHOD BLD: ABNORMAL
EOSINOPHIL # BLD: 0 K/MCL (ref 0.1–0.5)
EOSINOPHIL # BLD: 0 K/MCL (ref 0.1–0.5)
EOSINOPHIL NFR BLD: 0 %
EOSINOPHIL NFR BLD: 0 %
ERYTHROCYTE [DISTWIDTH] IN BLOOD: 15.4 % (ref 11–15)
ERYTHROCYTE [DISTWIDTH] IN BLOOD: 15.6 % (ref 11–15)
FERRITIN SERPL-MCNC: 479 NG/ML (ref 8–252)
FIBRINOGEN PPP-MCNC: 218 MG/DL (ref 190–425)
FIBRINOGEN PPP-MCNC: 280 MG/DL (ref 190–425)
GLOBULIN SER-MCNC: 2.2 G/DL (ref 2–4)
GLUCOSE BLD-MCNC: 142 MG/DL (ref 70–99)
GLUCOSE BLDC GLUCOMTR-MCNC: 135 MG/DL (ref 70–99)
GLUCOSE BLDC GLUCOMTR-MCNC: 136 MG/DL (ref 70–99)
GLUCOSE BLDC GLUCOMTR-MCNC: 144 MG/DL (ref 70–99)
GLUCOSE BLDC GLUCOMTR-MCNC: 151 MG/DL (ref 70–99)
GLUCOSE BLDC GLUCOMTR-MCNC: 151 MG/DL (ref 70–99)
GLUCOSE BLDC GLUCOMTR-MCNC: 153 MG/DL (ref 70–99)
GLUCOSE BLDC GLUCOMTR-MCNC: 158 MG/DL (ref 70–99)
GLUCOSE BLDC GLUCOMTR-MCNC: 159 MG/DL (ref 70–99)
GLUCOSE BLDC GLUCOMTR-MCNC: 159 MG/DL (ref 70–99)
GLUCOSE BLDC GLUCOMTR-MCNC: 164 MG/DL (ref 70–99)
GLUCOSE BLDC GLUCOMTR-MCNC: ABNORMAL MG/DL
GLUCOSE SERPL-MCNC: 126 MG/DL (ref 65–99)
GLUCOSE SERPL-MCNC: 142 MG/DL (ref 65–99)
GLUCOSE SERPL-MCNC: 149 MG/DL (ref 65–99)
HCG SERPL QL: NEGATIVE
HCO3 BLDA-SCNC: 24 MMOL/L (ref 22–28)
HCO3 BLDA-SCNC: 25 MMOL/L (ref 22–28)
HCO3 BLDA-SCNC: 26 MMOL/L (ref 22–28)
HCO3 BLDA-SCNC: 27 MMOL/L (ref 22–28)
HCT VFR BLD CALC: 26.4 % (ref 36–46.5)
HCT VFR BLD CALC: 28.5 % (ref 36–46.5)
HCT VFR BLD CALC: 30.5 % (ref 36–46.5)
HCT VFR BLD CALC: ABNORMAL %
HGB BLD-MCNC: 10.3 G/DL (ref 12–15.5)
HGB BLD-MCNC: 10.3 G/DL (ref 12–15.5)
HGB BLD-MCNC: 10.6 G/DL (ref 12–15.5)
HGB BLD-MCNC: 10.6 G/DL (ref 12–15.5)
HGB BLD-MCNC: 11.8 G/DL (ref 12–15.5)
HGB BLD-MCNC: 9 G/DL (ref 12–15.5)
HGB BLD-MCNC: 9.5 G/DL (ref 12–15.5)
HGB BLD-MCNC: 9.8 G/DL (ref 12–15.5)
HGB BLD-MCNC: 9.9 G/DL (ref 12–15.5)
HOROWITZ INDEX BLD+IHG-RTO: ABNORMAL MM[HG]
HYPOCHROMIA (HYPOC): ABNORMAL
INR PPP: 1.1
INR PPP: 1.1
INR PPP: ABNORMAL
INR PPP: ABNORMAL
LACTATE BLDA-MCNC: 1.2 MMOL/L
LACTATE BLDA-MCNC: 1.2 MMOL/L
LACTATE BLDA-MCNC: 1.3 MMOL/L
LACTATE BLDA-MCNC: 1.6 MMOL/L
LACTATE BLDA-MCNC: 1.9 MMOL/L
LACTATE BLDA-MCNC: 2.1 MMOL/L
LDH SERPL L TO P-CCNC: 1080 UNITS/L (ref 82–240)
LYMPHOCYTES # BLD: 0.8 K/MCL (ref 1–4.8)
LYMPHOCYTES # BLD: 0.9 K/MCL (ref 1–4.8)
LYMPHOCYTES NFR BLD: 4 %
LYMPHOCYTES NFR BLD: 5 %
MAGNESIUM SERPL-MCNC: 2.3 MG/DL (ref 1.7–2.4)
MAGNESIUM SERPL-MCNC: 2.4 MG/DL (ref 1.7–2.4)
MAGNESIUM SERPL-MCNC: 2.5 MG/DL (ref 1.7–2.4)
MCH RBC QN AUTO: 29.1 PG (ref 26–34)
MCH RBC QN AUTO: 29.4 PG (ref 26–34)
MCHC RBC AUTO-ENTMCNC: 34.1 G/DL (ref 32–36.5)
MCHC RBC AUTO-ENTMCNC: 34.4 G/DL (ref 32–36.5)
MCV RBC AUTO: 84.6 FL (ref 78–100)
MCV RBC AUTO: 86.3 FL (ref 78–100)
METAMYELOCYTES NFR BLD: 1 % (ref 0–2)
METAMYELOCYTES NFR BLD: 1 % (ref 0–2)
METHGB MFR BLD: 1.3 %
METHGB MFR BLD: 1.4 %
METHGB MFR BLD: 1.7 %
METHGB MFR BLD: 1.7 %
MONOCYTES # BLD: 0.2 K/MCL (ref 0.3–0.9)
MONOCYTES # BLD: 0.5 K/MCL (ref 0.3–0.9)
MONOCYTES NFR BLD: 1 %
MONOCYTES NFR BLD: 3 %
NEUTROPHILS # BLD: 16.6 K/MCL (ref 1.8–7.7)
NEUTROPHILS # BLD: 17.7 K/MCL (ref 1.8–7.7)
NEUTS BAND NFR BLD: 19 % (ref 0–10)
NEUTS BAND NFR BLD: 6 % (ref 0–10)
NEUTS SEG NFR BLD: 72 %
NEUTS SEG NFR BLD: 88 %
NRBC (NRBCRE): 1 /100 WBC
NRBC (NRBCRE): 2 /100 WBC
NT-PROBNP SERPL-MCNC: 9134 PG/ML
NUM BPU REQUESTED: 1
NUM BPU REQUESTED: 1
OXYHGB MFR BLD: 96.9 % (ref 94–98)
OXYHGB MFR BLD: 97.1 % (ref 94–98)
OXYHGB MFR BLD: 97.3 % (ref 94–98)
OXYHGB MFR BLD: 97.4 % (ref 94–98)
OXYHGB MFR BLD: 97.5 % (ref 94–98)
OXYHGB MFR BLDA: 97.5 % (ref 94–98)
PAO2 / FIO2 RATIO (RPFR): 603 (ref 300–500)
PAO2 / FIO2 RATIO (RPFR): ABNORMAL
PATH REV BLD -IMP: ABNORMAL
PATH REV BLD -IMP: ABNORMAL
PCO2 BLDA: 38 MM HG (ref 32–45)
PCO2 BLDA: 40 MM HG (ref 32–45)
PCO2 BLDA: 43 MM HG (ref 32–45)
PCO2 BLDA: 44 MM HG (ref 32–45)
PCO2 BLDA: 45 MM HG (ref 32–45)
PCO2 BLDA: 47 MM HG (ref 32–45)
PH BLDA: 7.35 UNITS (ref 7.35–7.45)
PH BLDA: 7.35 UNITS (ref 7.35–7.45)
PH BLDA: 7.37 UNITS (ref 7.35–7.45)
PH BLDA: 7.39 UNITS (ref 7.35–7.45)
PH BLDA: 7.4 UNITS (ref 7.35–7.45)
PH BLDA: 7.43 UNITS (ref 7.35–7.45)
PHOSPHATE SERPL-MCNC: 3.1 MG/DL (ref 2.4–4.7)
PHOSPHATE SERPL-MCNC: 4.1 MG/DL (ref 2.4–4.7)
PHOSPHATE SERPL-MCNC: 4.2 MG/DL (ref 2.4–4.7)
PLAT MORPH BLD: NORMAL
PLAT MORPH BLD: NORMAL
PLATELET # BLD: 64 K/MCL (ref 140–450)
PLATELET # BLD: 99 K/MCL (ref 140–450)
PLATELET # BLD: ABNORMAL 10*3/UL
PO2 BLDA: 206 MM HG (ref 83–108)
PO2 BLDA: 210 MM HG (ref 83–108)
PO2 BLDA: 228 MM HG (ref 83–108)
PO2 BLDA: 236 MM HG (ref 83–108)
PO2 BLDA: 240 MM HG (ref 83–108)
PO2 BLDA: 508 MM HG (ref 83–108)
POLYCHROMASIA (POLY): ABNORMAL
POTASSIUM BLD-SCNC: 4.6 MMOL/L (ref 3.4–5.1)
POTASSIUM BLD-SCNC: 5 MMOL/L (ref 3.4–5.1)
POTASSIUM BLD-SCNC: 5.2 MMOL/L (ref 3.4–5.1)
POTASSIUM BLD-SCNC: 5.5 MMOL/L (ref 3.4–5.1)
POTASSIUM SERPL-SCNC: 4.5 MMOL/L (ref 3.4–5.1)
POTASSIUM SERPL-SCNC: 4.5 MMOL/L (ref 3.4–5.1)
POTASSIUM SERPL-SCNC: 4.8 MMOL/L (ref 3.4–5.1)
PROCALCITONIN SERPL IA-MCNC: 66.73 NG/ML
PROCALCITONIN SERPL IA-MCNC: ABNORMAL NG/ML
PROT SERPL-MCNC: 4.6 G/DL (ref 6.4–8.2)
PROTHROMBIN TIME (PRT2): ABNORMAL
PROTHROMBIN TIME (PRT2): ABNORMAL
PROTHROMBIN TIME: 11.9 SEC (ref 9.7–11.8)
PROTHROMBIN TIME: 11.9 SEC (ref 9.7–11.8)
RBC # BLD: 3.06 MIL/MCL (ref 4–5.2)
RBC # BLD: 3.37 MIL/MCL (ref 4–5.2)
SAO2 % BLDA: 100 %
SAO2 % BLDA: 100 % (ref 95–99)
SAO2 % BLDA: 99 % (ref 95–99)
SAO2 % BLDA: 99 % (ref 95–99)
SERVICE CMNT-IMP: NORMAL
SERVICE CMNT-IMP: NORMAL
SODIUM BLD-SCNC: 136 MMOL/L (ref 135–145)
SODIUM BLD-SCNC: 138 MMOL/L (ref 135–145)
SODIUM BLD-SCNC: 138 MMOL/L (ref 135–145)
SODIUM BLD-SCNC: 139 MMOL/L (ref 135–145)
SODIUM BLD-SCNC: 139 MMOL/L (ref 135–145)
SODIUM BLD-SCNC: 140 MMOL/L (ref 135–145)
SODIUM SERPL-SCNC: 141 MMOL/L (ref 135–145)
SODIUM SERPL-SCNC: 141 MMOL/L (ref 135–145)
SODIUM SERPL-SCNC: 143 MMOL/L (ref 135–145)
TOXIC GRANULATION (TOXIC): PRESENT
TOXIC VACUOLATION (TOXV): PRESENT
WBC # BLD: 18.2 K/MCL (ref 4.2–11)
WBC # BLD: 18.8 K/MCL (ref 4.2–11)
WBC MORPH BLD: NORMAL

## 2020-06-21 ENCOUNTER — TELEPHONE (OUTPATIENT)
Dept: SCHEDULING | Age: 20
End: 2020-06-21

## 2020-06-21 LAB
ABO + RH BLD: NORMAL
ALBUMIN SERPL-MCNC: 2.7 G/DL (ref 3.6–5.1)
ALBUMIN SERPL-MCNC: 2.7 G/DL (ref 3.6–5.1)
ALBUMIN/GLOB SERPL: 1 {RATIO} (ref 1–2.4)
ALBUMIN/GLOB SERPL: 1.1 {RATIO} (ref 1–2.4)
ALP SERPL-CCNC: 48 UNITS/L (ref 45–117)
ALP SERPL-CCNC: 53 UNITS/L (ref 45–117)
ALT SERPL-CCNC: 59 UNITS/L
ALT SERPL-CCNC: 66 UNITS/L
ANALYZER ANC (IANC): ABNORMAL
ANION GAP SERPL CALC-SCNC: 10 MMOL/L (ref 10–20)
ANION GAP SERPL CALC-SCNC: 11 MMOL/L (ref 10–20)
ANION GAP SERPL CALC-SCNC: 12 MMOL/L (ref 10–20)
APTT PPP: 25 SEC (ref 22–32)
APTT PPP: NORMAL S
APTT PPP: NORMAL S
AST SERPL-CCNC: 236 UNITS/L
AST SERPL-CCNC: 327 UNITS/L
BASE DEFICIT BLDA-SCNC: ABNORMAL MMOL/L
BASE EXCESS BLDA CALC-SCNC: 1 MMOL/L (ref 0–3)
BASOPHILS # BLD: 0 K/MCL (ref 0–0.3)
BASOPHILS NFR BLD: 0 %
BDY SITE: ABNORMAL
BILIRUB SERPL-MCNC: 1.5 MG/DL (ref 0.2–1)
BILIRUB SERPL-MCNC: 2.1 MG/DL (ref 0.2–1)
BLD GP AB SCN SERPL QL: NEGATIVE
BLD PROD TYP BPU: NORMAL
BLD UNIT ID BPU: NORMAL
BODY TEMPERATURE: 36.9 DEGREES
BODY TEMPERATURE: 36.9 DEGREES
BODY TEMPERATURE: 37 DEGREES
BUN SERPL-MCNC: 32 MG/DL (ref 6–20)
BUN SERPL-MCNC: 34 MG/DL (ref 6–20)
BUN SERPL-MCNC: 36 MG/DL (ref 6–20)
BUN/CREAT SERPL: 8 (ref 7–25)
BUN/CREAT SERPL: 8 (ref 7–25)
BUN/CREAT SERPL: 9 (ref 7–25)
CA-I BLD ISE-SCNC: 1.08 MMOL/L (ref 1.15–1.29)
CA-I BLD ISE-SCNC: 1.09 MMOL/L (ref 1.15–1.29)
CA-I BLD ISE-SCNC: 1.09 MMOL/L (ref 1.15–1.29)
CA-I BLD ISE-SCNC: 1.13 MMOL/L (ref 1.15–1.29)
CA-I BLDA-SCNC: 15 % (ref 15–23)
CA-I BLDA-SCNC: 15 % (ref 15–23)
CA-I BLDA-SCNC: 16 % (ref 15–23)
CA-I BLDA-SCNC: 16 ML/DL (ref 15–23)
CALCIUM SERPL-MCNC: 7.5 MG/DL (ref 8.4–10.2)
CALCIUM SERPL-MCNC: 7.6 MG/DL (ref 8.4–10.2)
CALCIUM SERPL-MCNC: 8 MG/DL (ref 8.4–10.2)
CHLORIDE SERPL-SCNC: 106 MMOL/L (ref 98–107)
CHLORIDE SERPL-SCNC: 106 MMOL/L (ref 98–107)
CHLORIDE SERPL-SCNC: 109 MMOL/L (ref 98–107)
CK SERPL-CCNC: 3693 UNITS/L (ref 26–192)
CO2 SERPL-SCNC: 24 MMOL/L (ref 21–32)
CO2 SERPL-SCNC: 26 MMOL/L (ref 21–32)
CO2 SERPL-SCNC: 26 MMOL/L (ref 21–32)
COHGB MFR BLD: 0.7 %
COHGB MFR BLD: 0.7 %
COHGB MFR BLD: 0.9 %
COHGB MFR BLD: 1.2 %
CONDITION, POC: ABNORMAL
CONDITION-RC: ABNORMAL
CONDITION: 1
CONDITION: ABNORMAL
CONDITION: ABNORMAL
CREAT SERPL-MCNC: 3.86 MG/DL (ref 0.51–0.95)
CREAT SERPL-MCNC: 4.15 MG/DL (ref 0.51–0.95)
CREAT SERPL-MCNC: 4.19 MG/DL (ref 0.51–0.95)
CROSSMATCH EXPIRE: NORMAL
CRP SERPL-MCNC: 7.8 MG/DL
D DIMER PPP FEU-MCNC: >35.2 MG/L (FEU)
D DIMER PPP FEU-MCNC: ABNORMAL
DIFFERENTIAL METHOD BLD: ABNORMAL
EOSINOPHIL # BLD: 0 K/MCL (ref 0.1–0.5)
EOSINOPHIL NFR BLD: 0 %
ERYTHROCYTE [DISTWIDTH] IN BLOOD: 14.8 % (ref 11–15)
ERYTHROCYTE [DISTWIDTH] IN BLOOD: 14.8 % (ref 11–15)
ERYTHROCYTE [DISTWIDTH] IN BLOOD: 15.1 % (ref 11–15)
FERRITIN SERPL-MCNC: 371 NG/ML (ref 8–252)
FIBRINOGEN PPP-MCNC: 191 MG/DL (ref 190–425)
GLOBULIN SER-MCNC: 2.5 G/DL (ref 2–4)
GLOBULIN SER-MCNC: 2.8 G/DL (ref 2–4)
GLUCOSE BLD-MCNC: 139 MG/DL (ref 70–99)
GLUCOSE BLDC GLUCOMTR-MCNC: 125 MG/DL (ref 70–99)
GLUCOSE BLDC GLUCOMTR-MCNC: 127 MG/DL (ref 70–99)
GLUCOSE BLDC GLUCOMTR-MCNC: 128 MG/DL (ref 70–99)
GLUCOSE BLDC GLUCOMTR-MCNC: 132 MG/DL (ref 70–99)
GLUCOSE BLDC GLUCOMTR-MCNC: 133 MG/DL (ref 70–99)
GLUCOSE BLDC GLUCOMTR-MCNC: 133 MG/DL (ref 70–99)
GLUCOSE BLDC GLUCOMTR-MCNC: 135 MG/DL (ref 70–99)
GLUCOSE BLDC GLUCOMTR-MCNC: 135 MG/DL (ref 70–99)
GLUCOSE BLDC GLUCOMTR-MCNC: 138 MG/DL (ref 70–99)
GLUCOSE BLDC GLUCOMTR-MCNC: 138 MG/DL (ref 70–99)
GLUCOSE BLDC GLUCOMTR-MCNC: 142 MG/DL (ref 70–99)
GLUCOSE BLDC GLUCOMTR-MCNC: 142 MG/DL (ref 70–99)
GLUCOSE BLDC GLUCOMTR-MCNC: 143 MG/DL (ref 70–99)
GLUCOSE BLDC GLUCOMTR-MCNC: 145 MG/DL (ref 70–99)
GLUCOSE BLDC GLUCOMTR-MCNC: 146 MG/DL (ref 70–99)
GLUCOSE BLDC GLUCOMTR-MCNC: 146 MG/DL (ref 70–99)
GLUCOSE BLDC GLUCOMTR-MCNC: ABNORMAL MG/DL
GLUCOSE SERPL-MCNC: 126 MG/DL (ref 65–99)
GLUCOSE SERPL-MCNC: 128 MG/DL (ref 65–99)
GLUCOSE SERPL-MCNC: 139 MG/DL (ref 65–99)
HCO3 BLDA-SCNC: 24 MMOL/L (ref 22–28)
HCO3 BLDA-SCNC: 25 MMOL/L (ref 22–28)
HCO3 BLDA-SCNC: 25 MMOL/L (ref 22–28)
HCO3 BLDA-SCNC: 26 MMOL/L (ref 22–28)
HCT VFR BLD CALC: 29.1 % (ref 36–46.5)
HCT VFR BLD CALC: 30.8 % (ref 36–46.5)
HCT VFR BLD CALC: 30.8 % (ref 36–46.5)
HCT VFR BLD CALC: ABNORMAL %
HGB BLD-MCNC: 10.3 G/DL (ref 12–15.5)
HGB BLD-MCNC: 10.3 G/DL (ref 12–15.5)
HGB BLD-MCNC: 10.4 G/DL (ref 12–15.5)
HGB BLD-MCNC: 10.4 G/DL (ref 12–15.5)
HGB BLD-MCNC: 11 G/DL (ref 12–15.5)
HGB BLD-MCNC: 11.4 G/DL (ref 12–15.5)
HGB BLD-MCNC: 9.8 G/DL (ref 12–15.5)
HOROWITZ INDEX BLD+IHG-RTO: ABNORMAL MM[HG]
HYPOCHROMIA (HYPOC): ABNORMAL
HYPOCHROMIA (HYPOC): ABNORMAL
INR PPP: 1.1
INR PPP: NORMAL
LACTATE BLDA-MCNC: 1.1 MMOL/L
LACTATE BLDA-MCNC: 1.2 MMOL/L
LARGE PLATELETS (PLTL): PRESENT
LARGE PLATELETS (PLTL): PRESENT
LDH SERPL L TO P-CCNC: 949 UNITS/L (ref 82–240)
LYMPHOCYTES # BLD: 0.9 K/MCL (ref 1–4.8)
LYMPHOCYTES # BLD: 1 K/MCL (ref 1–4.8)
LYMPHOCYTES # BLD: 1.4 K/MCL (ref 1–4.8)
LYMPHOCYTES NFR BLD: 3 %
LYMPHOCYTES NFR BLD: 4 %
LYMPHOCYTES NFR BLD: 6 %
MAGNESIUM SERPL-MCNC: 2.7 MG/DL (ref 1.7–2.4)
MAGNESIUM SERPL-MCNC: 2.8 MG/DL (ref 1.7–2.4)
MAGNESIUM SERPL-MCNC: 2.9 MG/DL (ref 1.7–2.4)
MAJ XM SERPL-IMP: NORMAL
MCH RBC QN AUTO: 28.9 PG (ref 26–34)
MCH RBC QN AUTO: 28.9 PG (ref 26–34)
MCH RBC QN AUTO: 29 PG (ref 26–34)
MCHC RBC AUTO-ENTMCNC: 33.4 G/DL (ref 32–36.5)
MCHC RBC AUTO-ENTMCNC: 33.4 G/DL (ref 32–36.5)
MCHC RBC AUTO-ENTMCNC: 33.7 G/DL (ref 32–36.5)
MCV RBC AUTO: 86.1 FL (ref 78–100)
MCV RBC AUTO: 86.3 FL (ref 78–100)
MCV RBC AUTO: 86.5 FL (ref 78–100)
METAMYELOCYTES NFR BLD: 1 % (ref 0–2)
METAMYELOCYTES NFR BLD: 1 % (ref 0–2)
METAMYELOCYTES NFR BLD: 3 % (ref 0–2)
METHGB MFR BLD: 1.8 %
METHGB MFR BLD: 1.9 %
METHGB MFR BLD: 2.1 %
METHGB MFR BLD: 2.3 %
MONOCYTES # BLD: 0.4 K/MCL (ref 0.3–0.9)
MONOCYTES # BLD: 0.7 K/MCL (ref 0.3–0.9)
MONOCYTES # BLD: 1 K/MCL (ref 0.3–0.9)
MONOCYTES NFR BLD: 2 %
MONOCYTES NFR BLD: 3 %
MONOCYTES NFR BLD: 4 %
MYELOCYTES NFR BLD: 1 %
MYELOCYTES NFR BLD: 1 %
NEUTROPHILS # BLD: 20.1 K/MCL (ref 1.8–7.7)
NEUTROPHILS # BLD: 20.5 K/MCL (ref 1.8–7.7)
NEUTROPHILS # BLD: 22.2 K/MCL (ref 1.8–7.7)
NEUTS BAND NFR BLD: 2 % (ref 0–10)
NEUTS BAND NFR BLD: 6 % (ref 0–10)
NEUTS SEG NFR BLD: 86 %
NEUTS SEG NFR BLD: 87 %
NEUTS SEG NFR BLD: 89 %
NRBC (NRBCRE): 1 /100 WBC
NT-PROBNP SERPL-MCNC: 6083 PG/ML
NUM BPU REQUESTED: 16
NUM BPU REQUESTED: 4
OVALOCYTES (OVALO): ABNORMAL
OXYHGB MFR BLD: 96.5 % (ref 94–98)
OXYHGB MFR BLD: 96.9 % (ref 94–98)
OXYHGB MFR BLD: 97 % (ref 94–98)
OXYHGB MFR BLDA: 96.6 % (ref 94–98)
PAO2 / FIO2 RATIO (RPFR): 528 (ref 300–500)
PAO2 / FIO2 RATIO (RPFR): ABNORMAL
PAO2 / FIO2 RATIO (RPFR): ABNORMAL
PATH REV BLD -IMP: ABNORMAL
PCO2 BLDA: 35 MM HG (ref 32–45)
PCO2 BLDA: 35 MM HG (ref 32–45)
PCO2 BLDA: 38 MM HG (ref 32–45)
PCO2 BLDA: 40 MM HG (ref 32–45)
PH BLDA: 7.42 UNITS (ref 7.35–7.45)
PH BLDA: 7.43 UNITS (ref 7.35–7.45)
PH BLDA: 7.45 UNITS (ref 7.35–7.45)
PH BLDA: 7.46 UNITS (ref 7.35–7.45)
PHOSPHATE SERPL-MCNC: 3.2 MG/DL (ref 2.4–4.7)
PHOSPHATE SERPL-MCNC: 3.6 MG/DL (ref 2.4–4.7)
PHOSPHATE SERPL-MCNC: 3.6 MG/DL (ref 2.4–4.7)
PLAT MORPH BLD: NORMAL
PLATELET # BLD: 29 K/MCL (ref 140–450)
PLATELET # BLD: 37 K/MCL (ref 140–450)
PLATELET # BLD: 59 K/MCL (ref 140–450)
PLATELET # BLD: ABNORMAL 10*3/UL
PLATELET # BLD: ABNORMAL 10*3/UL
PO2 BLDA: 186 MM HG (ref 83–108)
PO2 BLDA: 194 MM HG (ref 83–108)
PO2 BLDA: 210 MM HG (ref 83–108)
PO2 BLDA: 494 MM HG (ref 83–108)
POLYCHROMASIA (POLY): ABNORMAL
POLYCHROMASIA (POLY): ABNORMAL
POTASSIUM BLD-SCNC: 4.7 MMOL/L (ref 3.4–5.1)
POTASSIUM BLD-SCNC: 4.9 MMOL/L (ref 3.4–5.1)
POTASSIUM SERPL-SCNC: 4.4 MMOL/L (ref 3.4–5.1)
POTASSIUM SERPL-SCNC: 4.6 MMOL/L (ref 3.4–5.1)
POTASSIUM SERPL-SCNC: 4.6 MMOL/L (ref 3.4–5.1)
PROCALCITONIN SERPL IA-MCNC: 46.31 NG/ML
PROCALCITONIN SERPL IA-MCNC: ABNORMAL NG/ML
PROT SERPL-MCNC: 5.2 G/DL (ref 6.4–8.2)
PROT SERPL-MCNC: 5.5 G/DL (ref 6.4–8.2)
PROTHROMBIN TIME (PRT2): NORMAL
PROTHROMBIN TIME: 11.6 SEC (ref 9.7–11.8)
RBC # BLD: 3.38 MIL/MCL (ref 4–5.2)
RBC # BLD: 3.56 MIL/MCL (ref 4–5.2)
RBC # BLD: 3.57 MIL/MCL (ref 4–5.2)
SAO2 % BLDA: 100 %
SAO2 % BLDA: 100 % (ref 95–99)
SERVICE CMNT-IMP: NORMAL
SERVICE CMNT-IMP: NORMAL
SODIUM BLD-SCNC: 136 MMOL/L (ref 135–145)
SODIUM BLD-SCNC: 136 MMOL/L (ref 135–145)
SODIUM BLD-SCNC: 138 MMOL/L (ref 135–145)
SODIUM BLD-SCNC: 138 MMOL/L (ref 135–145)
SODIUM SERPL-SCNC: 137 MMOL/L (ref 135–145)
SODIUM SERPL-SCNC: 139 MMOL/L (ref 135–145)
SODIUM SERPL-SCNC: 140 MMOL/L (ref 135–145)
STATUS OF UNIT: NORMAL
TOXIC GRANULATION (TOXIC): PRESENT
TOXIC GRANULATION (TOXIC): PRESENT
TOXIC VACUOLATION (TOXV): PRESENT
TRANSFUSION STATUS: NORMAL
TRIGL SERPL-MCNC: 246 MG/DL
TRIGL SERPL-MCNC: ABNORMAL MG/DL
UNIT DIVISION: 0
VARIANT LYMPHS NFR BLD: 1 % (ref 0–5)
WBC # BLD: 21.6 K/MCL (ref 4.2–11)
WBC # BLD: 23.8 K/MCL (ref 4.2–11)
WBC # BLD: 24.4 K/MCL (ref 4.2–11)
WBC MORPH BLD: NORMAL
WBC MORPH BLD: NORMAL

## 2020-06-21 PROCEDURE — 99291 CRITICAL CARE FIRST HOUR: CPT | Performed by: INTERNAL MEDICINE

## 2020-06-22 ENCOUNTER — TELEPHONE (OUTPATIENT)
Dept: SCHEDULING | Age: 20
End: 2020-06-22

## 2020-06-22 LAB
ALBUMIN SERPL-MCNC: 2.8 G/DL (ref 3.6–5.1)
ALBUMIN/GLOB SERPL: 1 {RATIO} (ref 1–2.4)
ALP SERPL-CCNC: 58 UNITS/L (ref 45–117)
ALT SERPL-CCNC: 58 UNITS/L
ANALYZER ANC (IANC): ABNORMAL
ANION GAP SERPL CALC-SCNC: 10 MMOL/L (ref 10–20)
ANION GAP SERPL CALC-SCNC: 11 MMOL/L (ref 10–20)
APTT PPP: 26 SEC (ref 22–32)
APTT PPP: NORMAL S
APTT PPP: NORMAL S
AST SERPL-CCNC: 215 UNITS/L
BASE DEFICIT BLDA-SCNC: 0 MMOL/L (ref 0–2)
BASE DEFICIT BLDA-SCNC: 1 MMOL/L (ref 0–2)
BASE DEFICIT BLDA-SCNC: ABNORMAL MMOL/L
BASE EXCESS BLDA CALC-SCNC: 1 MMOL/L (ref 0–3)
BASE EXCESS BLDA CALC-SCNC: 2 MMOL/L (ref 0–3)
BASE EXCESS BLDA CALC-SCNC: 5 MMOL/L (ref 0–3)
BASE EXCESS BLDA CALC-SCNC: ABNORMAL MMOL/L
BASE EXCESS BLDA CALC-SCNC: ABNORMAL MMOL/L
BASOPHILS # BLD: 0 K/MCL (ref 0–0.3)
BASOPHILS # BLD: 0 K/MCL (ref 0–0.3)
BASOPHILS NFR BLD: 0 %
BASOPHILS NFR BLD: 0 %
BDY SITE: ABNORMAL
BILIRUB SERPL-MCNC: 2.8 MG/DL (ref 0.2–1)
BLD PROD TYP BPU: NORMAL
BLD UNIT ID BPU: NORMAL
BODY TEMPERATURE: 36.9 DEGREES
BODY TEMPERATURE: 37.1 DEGREES
BODY TEMPERATURE: 37.1 DEGREES
BODY TEMPERATURE: 37.2 DEGREES
BUN SERPL-MCNC: 39 MG/DL (ref 6–20)
BUN SERPL-MCNC: 49 MG/DL (ref 6–20)
BUN/CREAT SERPL: 10 (ref 7–25)
BUN/CREAT SERPL: 11 (ref 7–25)
CA-I BLD ISE-SCNC: 1.05 MMOL/L (ref 1.15–1.29)
CA-I BLD ISE-SCNC: 1.06 MMOL/L (ref 1.15–1.29)
CA-I BLD ISE-SCNC: 1.07 MMOL/L (ref 1.15–1.29)
CA-I BLD ISE-SCNC: 1.07 MMOL/L (ref 1.15–1.29)
CA-I BLD ISE-SCNC: 1.09 MMOL/L (ref 1.15–1.29)
CA-I BLDA-SCNC: 14 % (ref 15–23)
CA-I BLDA-SCNC: 14 % (ref 15–23)
CA-I BLDA-SCNC: 15 % (ref 15–23)
CA-I BLDA-SCNC: 15 % (ref 15–23)
CA-I BLDA-SCNC: 15 ML/DL (ref 15–23)
CALCIUM SERPL-MCNC: 7.4 MG/DL (ref 8.4–10.2)
CALCIUM SERPL-MCNC: 7.9 MG/DL (ref 8.4–10.2)
CHLORIDE SERPL-SCNC: 110 MMOL/L (ref 98–107)
CHLORIDE SERPL-SCNC: 110 MMOL/L (ref 98–107)
CK SERPL-CCNC: 1868 UNITS/L (ref 26–192)
CO2 SERPL-SCNC: 25 MMOL/L (ref 21–32)
CO2 SERPL-SCNC: 26 MMOL/L (ref 21–32)
COHGB MFR BLD: 0.9 %
COHGB MFR BLD: 1.1 %
COHGB MFR BLD: 1.2 %
COHGB MFR BLD: 1.2 %
COHGB MFR BLD: 1.4 %
CONDITION, POC: ABNORMAL
CONDITION-RC: ABNORMAL
CONDITION: 1
CONDITION: ABNORMAL
CREAT SERPL-MCNC: 3.86 MG/DL (ref 0.51–0.95)
CREAT SERPL-MCNC: 4.51 MG/DL (ref 0.51–0.95)
CRP SERPL-MCNC: 5.2 MG/DL
D DIMER PPP FEU-MCNC: >35.2 MG/L (FEU)
D DIMER PPP FEU-MCNC: ABNORMAL
DIFFERENTIAL METHOD BLD: ABNORMAL
DIFFERENTIAL METHOD BLD: ABNORMAL
EOSINOPHIL # BLD: 0 K/MCL (ref 0.1–0.5)
EOSINOPHIL # BLD: 0 K/MCL (ref 0.1–0.5)
EOSINOPHIL NFR BLD: 0 %
EOSINOPHIL NFR BLD: 0 %
ERYTHROCYTE [DISTWIDTH] IN BLOOD: 14.4 % (ref 11–15)
ERYTHROCYTE [DISTWIDTH] IN BLOOD: 14.5 % (ref 11–15)
ERYTHROCYTE [DISTWIDTH] IN BLOOD: 14.6 % (ref 11–15)
FERRITIN SERPL-MCNC: 351 NG/ML (ref 8–252)
FIBRINOGEN PPP-MCNC: 115 MG/DL (ref 190–425)
GLOBULIN SER-MCNC: 2.8 G/DL (ref 2–4)
GLUCOSE BLD-MCNC: 145 MG/DL (ref 70–99)
GLUCOSE BLDC GLUCOMTR-MCNC: 137 MG/DL (ref 70–99)
GLUCOSE BLDC GLUCOMTR-MCNC: 138 MG/DL (ref 70–99)
GLUCOSE BLDC GLUCOMTR-MCNC: 139 MG/DL (ref 70–99)
GLUCOSE BLDC GLUCOMTR-MCNC: 141 MG/DL (ref 70–99)
GLUCOSE BLDC GLUCOMTR-MCNC: 141 MG/DL (ref 70–99)
GLUCOSE BLDC GLUCOMTR-MCNC: 143 MG/DL (ref 70–99)
GLUCOSE BLDC GLUCOMTR-MCNC: 147 MG/DL (ref 70–99)
GLUCOSE BLDC GLUCOMTR-MCNC: 150 MG/DL (ref 70–99)
GLUCOSE BLDC GLUCOMTR-MCNC: 151 MG/DL (ref 70–99)
GLUCOSE BLDC GLUCOMTR-MCNC: 156 MG/DL (ref 70–99)
GLUCOSE BLDC GLUCOMTR-MCNC: 157 MG/DL (ref 70–99)
GLUCOSE BLDC GLUCOMTR-MCNC: 166 MG/DL (ref 70–99)
GLUCOSE BLDC GLUCOMTR-MCNC: 167 MG/DL (ref 70–99)
GLUCOSE BLDC GLUCOMTR-MCNC: 173 MG/DL (ref 70–99)
GLUCOSE BLDC GLUCOMTR-MCNC: ABNORMAL MG/DL
GLUCOSE SERPL-MCNC: 132 MG/DL (ref 65–99)
GLUCOSE SERPL-MCNC: 158 MG/DL (ref 65–99)
HBV SURFACE AG SER QL: NEGATIVE
HCO3 BLDA-SCNC: 24 MMOL/L (ref 22–28)
HCO3 BLDA-SCNC: 25 MMOL/L (ref 22–28)
HCO3 BLDA-SCNC: 25 MMOL/L (ref 22–28)
HCO3 BLDA-SCNC: 26 MMOL/L (ref 22–28)
HCO3 BLDA-SCNC: 29 MMOL/L (ref 22–28)
HCT VFR BLD CALC: 27.3 % (ref 36–46.5)
HCT VFR BLD CALC: 28.2 % (ref 36–46.5)
HCT VFR BLD CALC: 29.3 % (ref 36–46.5)
HCT VFR BLD CALC: 29.9 % (ref 36–46.5)
HCT VFR BLD CALC: ABNORMAL %
HGB BLD-MCNC: 10 G/DL (ref 12–15.5)
HGB BLD-MCNC: 10 G/DL (ref 12–15.5)
HGB BLD-MCNC: 10.2 G/DL (ref 12–15.5)
HGB BLD-MCNC: 10.2 G/DL (ref 12–15.5)
HGB BLD-MCNC: 10.3 G/DL (ref 12–15.5)
HGB BLD-MCNC: 10.7 G/DL (ref 12–15.5)
HGB BLD-MCNC: 10.7 G/DL (ref 12–15.5)
HGB BLD-MCNC: 9.2 G/DL (ref 12–15.5)
HGB BLD-MCNC: 9.4 G/DL (ref 12–15.5)
HOROWITZ INDEX BLD+IHG-RTO: ABNORMAL MM[HG]
HYPOCHROMIA (HYPOC): ABNORMAL
INR PPP: 1.2
INR PPP: ABNORMAL
LACTATE BLDA-MCNC: 1.1 MMOL/L
LACTATE BLDA-MCNC: 1.2 MMOL/L
LACTATE BLDA-MCNC: 1.9 MMOL/L
LARGE PLATELETS (PLTL): PRESENT
LDH SERPL L TO P-CCNC: 1131 UNITS/L (ref 82–240)
LYMPHOCYTES # BLD: 1 K/MCL (ref 1–4.8)
LYMPHOCYTES # BLD: 1.7 K/MCL (ref 1–4.8)
LYMPHOCYTES NFR BLD: 3 %
LYMPHOCYTES NFR BLD: 7 %
MAGNESIUM SERPL-MCNC: 3 MG/DL (ref 1.7–2.4)
MAGNESIUM SERPL-MCNC: 3.1 MG/DL (ref 1.7–2.4)
MCH RBC QN AUTO: 28.9 PG (ref 26–34)
MCH RBC QN AUTO: 29.2 PG (ref 26–34)
MCH RBC QN AUTO: 29.4 PG (ref 26–34)
MCHC RBC AUTO-ENTMCNC: 33.7 G/DL (ref 32–36.5)
MCHC RBC AUTO-ENTMCNC: 34.1 G/DL (ref 32–36.5)
MCHC RBC AUTO-ENTMCNC: 34.1 G/DL (ref 32–36.5)
MCV RBC AUTO: 85.4 FL (ref 78–100)
MCV RBC AUTO: 85.8 FL (ref 78–100)
MCV RBC AUTO: 86.2 FL (ref 78–100)
METAMYELOCYTES NFR BLD: 1 % (ref 0–2)
METAMYELOCYTES NFR BLD: 2 % (ref 0–2)
METHGB MFR BLD: 1.7 %
METHGB MFR BLD: 1.8 %
METHGB MFR BLD: 1.9 %
METHGB MFR BLD: 2 %
METHGB MFR BLD: 2.4 %
MONOCYTES # BLD: 0.7 K/MCL (ref 0.3–0.9)
MONOCYTES # BLD: 1.8 K/MCL (ref 0.3–0.9)
MONOCYTES NFR BLD: 3 %
MONOCYTES NFR BLD: 7 %
MYELOCYTES NFR BLD: 1 %
MYELOCYTES NFR BLD: 2 %
NEUTROPHILS # BLD: 21.4 K/MCL (ref 1.8–7.7)
NEUTROPHILS # BLD: 21.4 K/MCL (ref 1.8–7.7)
NEUTS BAND NFR BLD: 2 % (ref 0–10)
NEUTS BAND NFR BLD: 4 % (ref 0–10)
NEUTS SEG NFR BLD: 81 %
NEUTS SEG NFR BLD: 86 %
NRBC (NRBCRE): 0 /100 WBC
NRBC (NRBCRE): 0 /100 WBC
NRBC (NRBCRE): 1 /100 WBC
NT-PROBNP SERPL-MCNC: 6734 PG/ML
NUM BPU REQUESTED: 1
OXYHGB MFR BLD: 96 % (ref 94–98)
OXYHGB MFR BLD: 96.4 % (ref 94–98)
OXYHGB MFR BLD: 96.6 % (ref 94–98)
OXYHGB MFR BLD: 97.1 % (ref 94–98)
OXYHGB MFR BLDA: 96.8 % (ref 94–98)
PAO2 / FIO2 RATIO (RPFR): ABNORMAL
PATH REV BLD -IMP: ABNORMAL
PATH REV BLD -IMP: ABNORMAL
PCO2 BLDA: 36 MM HG (ref 32–45)
PCO2 BLDA: 37 MM HG (ref 32–45)
PCO2 BLDA: 38 MM HG (ref 32–45)
PCO2 BLDA: 41 MM HG (ref 32–45)
PCO2 BLDA: 44 MM HG (ref 32–45)
PH BLDA: 7.36 UNITS (ref 7.35–7.45)
PH BLDA: 7.41 UNITS (ref 7.35–7.45)
PH BLDA: 7.45 UNITS (ref 7.35–7.45)
PH BLDA: 7.46 UNITS (ref 7.35–7.45)
PH BLDA: 7.46 UNITS (ref 7.35–7.45)
PHOSPHATE SERPL-MCNC: 3.1 MG/DL (ref 2.4–4.7)
PHOSPHATE SERPL-MCNC: 3.1 MG/DL (ref 2.4–4.7)
PLAT MORPH BLD: NORMAL
PLATELET # BLD: 49 K/MCL (ref 140–450)
PLATELET # BLD: 51 K/MCL (ref 140–450)
PLATELET # BLD: 52 K/MCL (ref 140–450)
PLATELET # BLD: 55 K/MCL (ref 140–450)
PO2 BLDA: 124 MM HG (ref 83–108)
PO2 BLDA: 140 MM HG (ref 83–108)
PO2 BLDA: 147 MM HG (ref 83–108)
PO2 BLDA: 179 MM HG (ref 83–108)
PO2 BLDA: 250 MM HG (ref 83–108)
POLYCHROMASIA (POLY): ABNORMAL
POLYCHROMASIA (POLY): ABNORMAL
POTASSIUM BLD-SCNC: 4.7 MMOL/L (ref 3.4–5.1)
POTASSIUM BLD-SCNC: 4.7 MMOL/L (ref 3.4–5.1)
POTASSIUM BLD-SCNC: 4.9 MMOL/L (ref 3.4–5.1)
POTASSIUM BLD-SCNC: 5 MMOL/L (ref 3.4–5.1)
POTASSIUM BLD-SCNC: 5.2 MMOL/L (ref 3.4–5.1)
POTASSIUM SERPL-SCNC: 4.6 MMOL/L (ref 3.4–5.1)
POTASSIUM SERPL-SCNC: 4.9 MMOL/L (ref 3.4–5.1)
PROCALCITONIN SERPL IA-MCNC: 31.23 NG/ML
PROCALCITONIN SERPL IA-MCNC: ABNORMAL NG/ML
PROT SERPL-MCNC: 5.6 G/DL (ref 6.4–8.2)
PROTHROMBIN TIME (PRT2): ABNORMAL
PROTHROMBIN TIME: 13 SEC (ref 9.7–11.8)
RBC # BLD: 3.18 MIL/MCL (ref 4–5.2)
RBC # BLD: 3.43 MIL/MCL (ref 4–5.2)
RBC # BLD: 3.47 MIL/MCL (ref 4–5.2)
RESPIRATORY CUL/SMR (RTCS) HL: NORMAL
SAO2 % BLDA: 100 %
SAO2 % BLDA: 100 % (ref 95–99)
SAO2 % BLDA: 99 % (ref 95–99)
SERVICE CMNT-IMP: ABNORMAL
SERVICE CMNT-IMP: NORMAL
SODIUM BLD-SCNC: 136 MMOL/L (ref 135–145)
SODIUM BLD-SCNC: 136 MMOL/L (ref 135–145)
SODIUM BLD-SCNC: 137 MMOL/L (ref 135–145)
SODIUM SERPL-SCNC: 141 MMOL/L (ref 135–145)
SODIUM SERPL-SCNC: 141 MMOL/L (ref 135–145)
STATUS OF UNIT: NORMAL
TOXIC GRANULATION (TOXIC): PRESENT
TOXIC GRANULATION (TOXIC): PRESENT
TRANSFUSION STATUS: NORMAL
UNIT DIVISION: 0
VARIANT LYMPHS NFR BLD: 1 % (ref 0–5)
WBC # BLD: 24.3 K/MCL (ref 4.2–11)
WBC # BLD: 25.2 K/MCL (ref 4.2–11)
WBC # BLD: 25.8 K/MCL (ref 4.2–11)

## 2020-06-22 PROCEDURE — 99291 CRITICAL CARE FIRST HOUR: CPT | Performed by: INTERNAL MEDICINE

## 2020-06-23 ENCOUNTER — HOSPITAL (OUTPATIENT)
Dept: OTHER | Age: 20
End: 2020-06-23

## 2020-06-23 LAB
%HEPARIN INHIBITED (PF4INH): NORMAL
ALBUMIN SERPL-MCNC: 2.7 G/DL (ref 3.6–5.1)
ALBUMIN/GLOB SERPL: 1 {RATIO} (ref 1–2.4)
ALP SERPL-CCNC: 69 UNITS/L (ref 45–117)
ALT SERPL-CCNC: 59 UNITS/L
ANALYZER ANC (IANC): ABNORMAL
ANALYZER ANC (IANC): ABNORMAL
ANION GAP SERPL CALC-SCNC: 13 MMOL/L (ref 10–20)
ANION GAP SERPL CALC-SCNC: 8 MMOL/L (ref 10–20)
APTT PPP: 24 SEC (ref 22–32)
APTT PPP: 25 SEC (ref 22–32)
APTT PPP: NORMAL S
AST SERPL-CCNC: 198 UNITS/L
BASE DEFICIT BLDA-SCNC: ABNORMAL MMOL/L
BASE EXCESS BLDA CALC-SCNC: 0 MMOL/L (ref 0–3)
BASE EXCESS BLDA CALC-SCNC: 2 MMOL/L (ref 0–3)
BASE EXCESS BLDA CALC-SCNC: 2 MMOL/L (ref 0–3)
BASE EXCESS BLDA CALC-SCNC: 3 MMOL/L (ref 0–3)
BASE EXCESS BLDA CALC-SCNC: 4 MMOL/L (ref 0–3)
BASOPHILS # BLD: 0 K/MCL (ref 0–0.3)
BASOPHILS NFR BLD: 0 %
BDY SITE: ABNORMAL
BILIRUB SERPL-MCNC: 2.4 MG/DL (ref 0.2–1)
BLOOD BANK CMNT PATIENT-IMP: NORMAL
BLOOD BANK CMNT PATIENT-IMP: NORMAL
BODY TEMPERATURE: 37 DEGREES
BODY TEMPERATURE: 37.1 DEGREES
BODY TEMPERATURE: 37.3 DEGREES
BODY TEMPERATURE: 37.5 DEGREES
BUN SERPL-MCNC: 28 MG/DL (ref 6–20)
BUN SERPL-MCNC: 45 MG/DL (ref 6–20)
BUN/CREAT SERPL: 10 (ref 7–25)
BUN/CREAT SERPL: 11 (ref 7–25)
CA-I BLD ISE-SCNC: 0.98 MMOL/L (ref 1.15–1.29)
CA-I BLD ISE-SCNC: 1.03 MMOL/L (ref 1.15–1.29)
CA-I BLD ISE-SCNC: 1.04 MMOL/L (ref 1.15–1.29)
CA-I BLD ISE-SCNC: 1.07 MMOL/L (ref 1.15–1.29)
CA-I BLD ISE-SCNC: 1.07 MMOL/L (ref 1.15–1.29)
CA-I BLDA-SCNC: 13 % (ref 15–23)
CA-I BLDA-SCNC: 14 % (ref 15–23)
CA-I BLDA-SCNC: 14 ML/DL (ref 15–23)
CALCIUM SERPL-MCNC: 7.6 MG/DL (ref 8.4–10.2)
CALCIUM SERPL-MCNC: 7.8 MG/DL (ref 8.4–10.2)
CHLORIDE SERPL-SCNC: 103 MMOL/L (ref 98–107)
CHLORIDE SERPL-SCNC: 109 MMOL/L (ref 98–107)
CK SERPL-CCNC: 1343 UNITS/L (ref 26–192)
CO2 SERPL-SCNC: 26 MMOL/L (ref 21–32)
CO2 SERPL-SCNC: 27 MMOL/L (ref 21–32)
COHGB MFR BLD: 0.6 %
COHGB MFR BLD: 0.6 %
COHGB MFR BLD: 1 %
COHGB MFR BLD: 1.1 %
COHGB MFR BLD: 1.2 %
CONDITION-RC: ABNORMAL
CONDITION: ABNORMAL
CREAT SERPL-MCNC: 2.69 MG/DL (ref 0.51–0.95)
CREAT SERPL-MCNC: 3.97 MG/DL (ref 0.51–0.95)
CRP SERPL-MCNC: 3 MG/DL
D DIMER PPP FEU-MCNC: >35.2 MG/L (FEU)
D DIMER PPP FEU-MCNC: ABNORMAL
DIFFERENTIAL METHOD BLD: ABNORMAL
EOSINOPHIL # BLD: 0 K/MCL (ref 0.1–0.5)
EOSINOPHIL NFR BLD: 0 %
ERYTHROCYTE [DISTWIDTH] IN BLOOD: 14.6 % (ref 11–15)
ERYTHROCYTE [DISTWIDTH] IN BLOOD: 14.7 % (ref 11–15)
FERRITIN SERPL-MCNC: 444 NG/ML (ref 8–252)
FIBRINOGEN PPP-MCNC: 180 MG/DL (ref 190–425)
FIBRINOGEN PPP-MCNC: 97 MG/DL (ref 190–425)
GLOBULIN SER-MCNC: 2.8 G/DL (ref 2–4)
GLUCOSE BLD-MCNC: 85 MG/DL (ref 70–99)
GLUCOSE BLDC GLUCOMTR-MCNC: 128 MG/DL (ref 70–99)
GLUCOSE BLDC GLUCOMTR-MCNC: 130 MG/DL (ref 70–99)
GLUCOSE BLDC GLUCOMTR-MCNC: 138 MG/DL (ref 70–99)
GLUCOSE BLDC GLUCOMTR-MCNC: 149 MG/DL (ref 70–99)
GLUCOSE BLDC GLUCOMTR-MCNC: 151 MG/DL (ref 70–99)
GLUCOSE BLDC GLUCOMTR-MCNC: 154 MG/DL (ref 70–99)
GLUCOSE BLDC GLUCOMTR-MCNC: 156 MG/DL (ref 70–99)
GLUCOSE BLDC GLUCOMTR-MCNC: 159 MG/DL (ref 70–99)
GLUCOSE BLDC GLUCOMTR-MCNC: 162 MG/DL (ref 70–99)
GLUCOSE BLDC GLUCOMTR-MCNC: 164 MG/DL (ref 70–99)
GLUCOSE BLDC GLUCOMTR-MCNC: 166 MG/DL (ref 70–99)
GLUCOSE BLDC GLUCOMTR-MCNC: ABNORMAL MG/DL
GLUCOSE SERPL-MCNC: 125 MG/DL (ref 65–99)
GLUCOSE SERPL-MCNC: 155 MG/DL (ref 65–99)
HCO3 BLDA-SCNC: 25 MMOL/L (ref 22–28)
HCO3 BLDA-SCNC: 26 MMOL/L (ref 22–28)
HCO3 BLDA-SCNC: 27 MMOL/L (ref 22–28)
HCT VFR BLD CALC: 26.1 % (ref 36–46.5)
HCT VFR BLD CALC: 26.3 % (ref 36–46.5)
HCT VFR BLD CALC: 27.1 % (ref 36–46.5)
HCT VFR BLD CALC: 27.7 % (ref 36–46.5)
HCT VFR BLD CALC: ABNORMAL %
HGB BLD-MCNC: 10.3 G/DL (ref 12–15.5)
HGB BLD-MCNC: 8.9 G/DL (ref 12–15.5)
HGB BLD-MCNC: 8.9 G/DL (ref 12–15.5)
HGB BLD-MCNC: 9.2 G/DL (ref 12–15.5)
HGB BLD-MCNC: 9.3 G/DL (ref 12–15.5)
HGB BLD-MCNC: 9.4 G/DL (ref 12–15.5)
HGB BLD-MCNC: 9.4 G/DL (ref 12–15.5)
HGB BLD-MCNC: 9.9 G/DL (ref 12–15.5)
HGB BLD-MCNC: 9.9 G/DL (ref 12–15.5)
HOROWITZ INDEX BLD+IHG-RTO: ABNORMAL MM[HG]
IGG INTERPRETATION (PF4GI): NORMAL
INR PPP: 1.2
INR PPP: 1.3
INR PPP: ABNORMAL
INR PPP: ABNORMAL
LACTATE BLDA-MCNC: 1.4 MMOL/L
LACTATE BLDA-MCNC: 1.5 MMOL/L
LACTATE BLDA-MCNC: 2.6 MMOL/L
LARGE PLATELETS (PLTL): PRESENT
LDH SERPL L TO P-CCNC: 1414 UNITS/L (ref 82–240)
LYMPHOCYTES # BLD: 1.7 K/MCL (ref 1–4.8)
LYMPHOCYTES NFR BLD: 6 %
MAGNESIUM SERPL-MCNC: 2 MG/DL (ref 1.7–2.4)
MAGNESIUM SERPL-MCNC: 2.4 MG/DL (ref 1.7–2.4)
MCH RBC QN AUTO: 29.1 PG (ref 26–34)
MCH RBC QN AUTO: 29.7 PG (ref 26–34)
MCHC RBC AUTO-ENTMCNC: 33.8 G/DL (ref 32–36.5)
MCHC RBC AUTO-ENTMCNC: 33.9 G/DL (ref 32–36.5)
MCV RBC AUTO: 85.8 FL (ref 78–100)
MCV RBC AUTO: 87.7 FL (ref 78–100)
METAMYELOCYTES NFR BLD: 2 % (ref 0–2)
METHGB MFR BLD: 2.2 %
METHGB MFR BLD: 2.9 %
METHGB MFR BLD: 3.6 %
METHGB MFR BLD: 3.6 %
METHGB MFR BLD: 4.7 %
MONOCYTES # BLD: 2.5 K/MCL (ref 0.3–0.9)
MONOCYTES NFR BLD: 9 %
MYELOCYTES NFR BLD: 1 %
NEUTROPHILS # BLD: 22.6 K/MCL (ref 1.8–7.7)
NEUTS BAND NFR BLD: 2 % (ref 0–10)
NEUTS SEG NFR BLD: 80 %
NRBC (NRBCRE): 0 /100 WBC
NRBC (NRBCRE): 1 /100 WBC
NT-PROBNP SERPL-MCNC: ABNORMAL PG/ML
NUM BPU REQUESTED: 1
NUM BPU REQUESTED: 1
OXYHGB MFR BLD: 94 % (ref 94–98)
OXYHGB MFR BLD: 94.9 % (ref 94–98)
OXYHGB MFR BLD: 95.3 % (ref 94–98)
OXYHGB MFR BLD: 95.8 % (ref 94–98)
OXYHGB MFR BLDA: 95.4 % (ref 94–98)
PAO2 / FIO2 RATIO (RPFR): ABNORMAL
PATH REV BLD -IMP: ABNORMAL
PCO2 BLDA: 30 MM HG (ref 32–45)
PCO2 BLDA: 31 MM HG (ref 32–45)
PCO2 BLDA: 33 MM HG (ref 32–45)
PCO2 BLDA: 36 MM HG (ref 32–45)
PCO2 BLDA: 43 MM HG (ref 32–45)
PF4 IGG OPTICAL DENSITY (PF4GOD): 0.1
PF4 IGG RESULT (PF4G1): NEGATIVE
PH BLDA: 7.38 UNITS (ref 7.35–7.45)
PH BLDA: 7.46 UNITS (ref 7.35–7.45)
PH BLDA: 7.49 UNITS (ref 7.35–7.45)
PH BLDA: 7.53 UNITS (ref 7.35–7.45)
PH BLDA: 7.54 UNITS (ref 7.35–7.45)
PHOSPHATE SERPL-MCNC: 1.9 MG/DL (ref 2.4–4.7)
PHOSPHATE SERPL-MCNC: 2.6 MG/DL (ref 2.4–4.7)
PLATELET # BLD: 54 K/MCL (ref 140–450)
PLATELET # BLD: 62 K/MCL (ref 140–450)
PLATELET # BLD: 64 K/MCL (ref 140–450)
PLATELET # BLD: 65 K/MCL (ref 140–450)
PO2 BLDA: 108 MM HG (ref 83–108)
PO2 BLDA: 159 MM HG (ref 83–108)
PO2 BLDA: 174 MM HG (ref 83–108)
PO2 BLDA: 79 MM HG (ref 83–108)
PO2 BLDA: 96 MM HG (ref 83–108)
POLYCHROMASIA (POLY): ABNORMAL
POTASSIUM BLD-SCNC: 4.3 MMOL/L (ref 3.4–5.1)
POTASSIUM BLD-SCNC: 4.5 MMOL/L (ref 3.4–5.1)
POTASSIUM BLD-SCNC: 4.7 MMOL/L (ref 3.4–5.1)
POTASSIUM BLD-SCNC: 4.8 MMOL/L (ref 3.4–5.1)
POTASSIUM BLD-SCNC: 4.9 MMOL/L (ref 3.4–5.1)
POTASSIUM SERPL-SCNC: 4.3 MMOL/L (ref 3.4–5.1)
POTASSIUM SERPL-SCNC: 4.6 MMOL/L (ref 3.4–5.1)
PROCALCITONIN SERPL IA-MCNC: 29.59 NG/ML
PROCALCITONIN SERPL IA-MCNC: ABNORMAL NG/ML
PROT SERPL-MCNC: 5.5 G/DL (ref 6.4–8.2)
PROTHROMBIN TIME (PRT2): ABNORMAL
PROTHROMBIN TIME (PRT2): ABNORMAL
PROTHROMBIN TIME: 12.9 SEC (ref 9.7–11.8)
PROTHROMBIN TIME: 13.1 SEC (ref 9.7–11.8)
RBC # BLD: 3 MIL/MCL (ref 4–5.2)
RBC # BLD: 3.16 MIL/MCL (ref 4–5.2)
SAO2 % BLDA: 100 %
SAO2 % BLDA: 100 % (ref 95–99)
SAO2 % BLDA: 99 % (ref 95–99)
SERVICE CMNT-IMP: ABNORMAL
SERVICE CMNT-IMP: ABNORMAL
SODIUM BLD-SCNC: 135 MMOL/L (ref 135–145)
SODIUM BLD-SCNC: 135 MMOL/L (ref 135–145)
SODIUM BLD-SCNC: 137 MMOL/L (ref 135–145)
SODIUM BLD-SCNC: 138 MMOL/L (ref 135–145)
SODIUM BLD-SCNC: 138 MMOL/L (ref 135–145)
SODIUM SERPL-SCNC: 138 MMOL/L (ref 135–145)
SODIUM SERPL-SCNC: 139 MMOL/L (ref 135–145)
TOXIC GRANULATION (TOXIC): PRESENT
WBC # BLD: 27.6 K/MCL (ref 4.2–11)
WBC # BLD: 27.9 K/MCL (ref 4.2–11)

## 2020-06-23 PROCEDURE — 99291 CRITICAL CARE FIRST HOUR: CPT | Performed by: INTERNAL MEDICINE

## 2020-06-23 PROCEDURE — 99254 IP/OBS CNSLTJ NEW/EST MOD 60: CPT | Performed by: INTERNAL MEDICINE

## 2020-06-24 LAB
ABO + RH BLD: NORMAL
ALBUMIN SERPL-MCNC: 2.6 G/DL (ref 3.6–5.1)
ALBUMIN/GLOB SERPL: 0.9 {RATIO} (ref 1–2.4)
ALP SERPL-CCNC: 83 UNITS/L (ref 45–117)
ALT SERPL-CCNC: 100 UNITS/L
ANALYZER ANC (IANC): ABNORMAL
ANALYZER ANC (IANC): ABNORMAL
ANION GAP SERPL CALC-SCNC: 13 MMOL/L (ref 10–20)
APTT PPP: 24 SEC (ref 22–32)
APTT PPP: NORMAL S
APTT PPP: NORMAL S
AST SERPL-CCNC: 199 UNITS/L
BACTERIA BLD CULT: NORMAL
BASE DEFICIT BLDA-SCNC: 1 MMOL/L (ref 0–2)
BASE DEFICIT BLDA-SCNC: ABNORMAL MMOL/L
BASE EXCESS BLDA CALC-SCNC: 0 MMOL/L (ref 0–3)
BASE EXCESS BLDA CALC-SCNC: 2 MMOL/L (ref 0–3)
BASE EXCESS BLDA CALC-SCNC: 2 MMOL/L (ref 0–3)
BASE EXCESS BLDA CALC-SCNC: 3 MMOL/L (ref 0–3)
BASE EXCESS BLDA CALC-SCNC: ABNORMAL MMOL/L
BASOPHILS # BLD: 0 K/MCL (ref 0–0.3)
BASOPHILS NFR BLD: 0 %
BDY SITE: ABNORMAL
BILIRUB SERPL-MCNC: 1.8 MG/DL (ref 0.2–1)
BLD GP AB SCN SERPL QL: NEGATIVE
BLD PROD TYP BPU: NORMAL
BLD UNIT ID BPU: NORMAL
BODY TEMPERATURE: 37 DEGREES
BODY TEMPERATURE: 37 DEGREES
BODY TEMPERATURE: 37.1 DEGREES
BODY TEMPERATURE: 37.6 DEGREES
BUN SERPL-MCNC: 61 MG/DL (ref 6–20)
BUN/CREAT SERPL: 14 (ref 7–25)
BURR CELLS (BURC): ABNORMAL
CA-I BLD ISE-SCNC: 0.99 MMOL/L (ref 1.15–1.29)
CA-I BLD ISE-SCNC: 1 MMOL/L (ref 1.15–1.29)
CA-I BLD ISE-SCNC: 1.01 MMOL/L (ref 1.15–1.29)
CA-I BLD ISE-SCNC: 1.04 MMOL/L (ref 1.15–1.29)
CA-I BLD ISE-SCNC: 1.05 MMOL/L (ref 1.15–1.29)
CA-I BLDA-SCNC: 13 % (ref 15–23)
CA-I BLDA-SCNC: 13 % (ref 15–23)
CA-I BLDA-SCNC: 14 % (ref 15–23)
CA-I BLDA-SCNC: 14 % (ref 15–23)
CA-I BLDA-SCNC: 14 ML/DL (ref 15–23)
CALCIUM SERPL-MCNC: 7.2 MG/DL (ref 8.4–10.2)
CHLORIDE SERPL-SCNC: 104 MMOL/L (ref 98–107)
CK SERPL-CCNC: 783 UNITS/L (ref 26–192)
CO2 SERPL-SCNC: 24 MMOL/L (ref 21–32)
COHGB MFR BLD: 0.8 %
COHGB MFR BLD: 0.9 %
COHGB MFR BLD: 1 %
COHGB MFR BLD: 1.1 %
COHGB MFR BLD: 1.2 %
CONDITION, POC: ABNORMAL
CONDITION-RC: ABNORMAL
CONDITION: ABNORMAL
CREAT SERPL-MCNC: 4.42 MG/DL (ref 0.51–0.95)
CROSSMATCH EXPIRE: NORMAL
CRP SERPL-MCNC: 1.9 MG/DL
D DIMER PPP FEU-MCNC: >35.2 MG/L (FEU)
D DIMER PPP FEU-MCNC: ABNORMAL
DIFFERENTIAL METHOD BLD: ABNORMAL
EOSINOPHIL # BLD: 0 K/MCL (ref 0.1–0.5)
EOSINOPHIL NFR BLD: 0 %
ERYTHROCYTE [DISTWIDTH] IN BLOOD: 14.4 % (ref 11–15)
ERYTHROCYTE [DISTWIDTH] IN BLOOD: 14.6 % (ref 11–15)
FERRITIN SERPL-MCNC: 502 NG/ML (ref 8–252)
FIBRINOGEN PPP-MCNC: 152 MG/DL (ref 190–425)
FIBRINOGEN PPP-MCNC: 162 MG/DL (ref 190–425)
FIBRINOGEN PPP-MCNC: 168 MG/DL (ref 190–425)
GLOBULIN SER-MCNC: 2.9 G/DL (ref 2–4)
GLUCOSE BLD-MCNC: 158 MG/DL (ref 70–99)
GLUCOSE BLDC GLUCOMTR-MCNC: 107 MG/DL (ref 70–99)
GLUCOSE BLDC GLUCOMTR-MCNC: 132 MG/DL (ref 70–99)
GLUCOSE BLDC GLUCOMTR-MCNC: 133 MG/DL (ref 70–99)
GLUCOSE BLDC GLUCOMTR-MCNC: 140 MG/DL (ref 70–99)
GLUCOSE BLDC GLUCOMTR-MCNC: 152 MG/DL (ref 70–99)
GLUCOSE BLDC GLUCOMTR-MCNC: 152 MG/DL (ref 70–99)
GLUCOSE BLDC GLUCOMTR-MCNC: 153 MG/DL (ref 70–99)
GLUCOSE BLDC GLUCOMTR-MCNC: 154 MG/DL (ref 70–99)
GLUCOSE BLDC GLUCOMTR-MCNC: 157 MG/DL (ref 70–99)
GLUCOSE BLDC GLUCOMTR-MCNC: 158 MG/DL (ref 70–99)
GLUCOSE BLDC GLUCOMTR-MCNC: 160 MG/DL (ref 70–99)
GLUCOSE BLDC GLUCOMTR-MCNC: 171 MG/DL (ref 70–99)
GLUCOSE BLDC GLUCOMTR-MCNC: 179 MG/DL (ref 70–99)
GLUCOSE BLDC GLUCOMTR-MCNC: ABNORMAL MG/DL
GLUCOSE SERPL-MCNC: 163 MG/DL (ref 65–99)
HCO3 BLDA-SCNC: 23 MMOL/L (ref 22–28)
HCO3 BLDA-SCNC: 24 MMOL/L (ref 22–28)
HCO3 BLDA-SCNC: 25 MMOL/L (ref 22–28)
HCT VFR BLD CALC: 25 % (ref 36–46.5)
HCT VFR BLD CALC: 26.7 % (ref 36–46.5)
HCT VFR BLD CALC: 27 % (ref 36–46.5)
HCT VFR BLD CALC: 28.3 % (ref 36–46.5)
HCT VFR BLD CALC: ABNORMAL %
HGB BLD-MCNC: 8.5 G/DL (ref 12–15.5)
HGB BLD-MCNC: 8.9 G/DL (ref 12–15.5)
HGB BLD-MCNC: 9 G/DL (ref 12–15.5)
HGB BLD-MCNC: 9.4 G/DL (ref 12–15.5)
HGB BLD-MCNC: 9.5 G/DL (ref 12–15.5)
HGB BLD-MCNC: 9.6 G/DL (ref 12–15.5)
HGB BLD-MCNC: 9.7 G/DL (ref 12–15.5)
HGB BLD-MCNC: 9.7 G/DL (ref 12–15.5)
HGB BLD-MCNC: 9.8 G/DL (ref 12–15.5)
HOROWITZ INDEX BLD+IHG-RTO: ABNORMAL MM[HG]
INR PPP: 1.1
INR PPP: 1.1
INR PPP: ABNORMAL
INR PPP: ABNORMAL
LACTATE BLDA-MCNC: 1.2 MMOL/L
LACTATE BLDA-MCNC: 1.4 MMOL/L
LACTATE BLDA-MCNC: 1.5 MMOL/L
LACTATE BLDA-MCNC: 1.5 MMOL/L
LACTATE BLDA-MCNC: 1.8 MMOL/L
LDH SERPL L TO P-CCNC: 1369 UNITS/L (ref 82–240)
LYMPHOCYTES # BLD: 2.5 K/MCL (ref 1–4.8)
LYMPHOCYTES NFR BLD: 11 %
MAGNESIUM SERPL-MCNC: 2.2 MG/DL (ref 1.7–2.4)
MAJ XM SERPL-IMP: NORMAL
MAJ XM SERPL-IMP: NORMAL
MCH RBC QN AUTO: 29.8 PG (ref 26–34)
MCH RBC QN AUTO: 29.9 PG (ref 26–34)
MCHC RBC AUTO-ENTMCNC: 34.3 G/DL (ref 32–36.5)
MCHC RBC AUTO-ENTMCNC: 34.8 G/DL (ref 32–36.5)
MCV RBC AUTO: 85.7 FL (ref 78–100)
MCV RBC AUTO: 87.3 FL (ref 78–100)
METAMYELOCYTES NFR BLD: 4 % (ref 0–2)
METHGB MFR BLD: 2.2 %
METHGB MFR BLD: 2.5 %
METHGB MFR BLD: 2.5 %
METHGB MFR BLD: 2.6 %
METHGB MFR BLD: 2.7 %
MONOCYTES # BLD: 0.9 K/MCL (ref 0.3–0.9)
MONOCYTES NFR BLD: 4 %
MYELOCYTES NFR BLD: 6 %
NEUTROPHILS # BLD: 16.7 K/MCL (ref 1.8–7.7)
NEUTS BAND NFR BLD: 1 % (ref 0–10)
NEUTS SEG NFR BLD: 74 %
NRBC (NRBCRE): 0 /100 WBC
NRBC (NRBCRE): 0 /100 WBC
NT-PROBNP SERPL-MCNC: ABNORMAL PG/ML
NUM BPU REQUESTED: 1
NUM BPU REQUESTED: 2
NUM BPU REQUESTED: 2
OXYHGB MFR BLD: 96.2 % (ref 94–98)
OXYHGB MFR BLD: 96.3 % (ref 94–98)
OXYHGB MFR BLD: 96.4 % (ref 94–98)
OXYHGB MFR BLD: 96.4 % (ref 94–98)
OXYHGB MFR BLDA: 96.6 % (ref 94–98)
PAO2 / FIO2 RATIO (RPFR): 388 (ref 300–500)
PAO2 / FIO2 RATIO (RPFR): 473 (ref 300–500)
PAO2 / FIO2 RATIO (RPFR): ABNORMAL
PAO2 / FIO2 RATIO (RPFR): ABNORMAL
PATH REV BLD -IMP: ABNORMAL
PCO2 BLDA: 29 MM HG (ref 32–45)
PCO2 BLDA: 30 MM HG (ref 32–45)
PCO2 BLDA: 31 MM HG (ref 32–45)
PCO2 BLDA: 32 MM HG (ref 32–45)
PCO2 BLDA: 33 MM HG (ref 32–45)
PH BLDA: 7.46 UNITS (ref 7.35–7.45)
PH BLDA: 7.46 UNITS (ref 7.35–7.45)
PH BLDA: 7.51 UNITS (ref 7.35–7.45)
PH BLDA: 7.52 UNITS (ref 7.35–7.45)
PH BLDA: 7.54 UNITS (ref 7.35–7.45)
PHOSPHATE SERPL-MCNC: 3.4 MG/DL (ref 2.4–4.7)
PLAT MORPH BLD: NORMAL
PLATELET # BLD: 117 K/MCL (ref 140–450)
PLATELET # BLD: 68 K/MCL (ref 140–450)
PLATELET # BLD: ABNORMAL 10*3/UL
PLATELET # BLD: ABNORMAL 10*3/UL
PO2 BLDA: 140 MM HG (ref 83–108)
PO2 BLDA: 159 MM HG (ref 83–108)
PO2 BLDA: 189 MM HG (ref 83–108)
PO2 BLDA: 226 MM HG (ref 83–108)
PO2 BLDA: 467 MM HG (ref 83–108)
POLYCHROMASIA (POLY): ABNORMAL
POTASSIUM BLD-SCNC: 4.1 MMOL/L (ref 3.4–5.1)
POTASSIUM BLD-SCNC: 4.2 MMOL/L (ref 3.4–5.1)
POTASSIUM BLD-SCNC: 4.4 MMOL/L (ref 3.4–5.1)
POTASSIUM BLD-SCNC: 4.5 MMOL/L (ref 3.4–5.1)
POTASSIUM BLD-SCNC: 4.5 MMOL/L (ref 3.4–5.1)
POTASSIUM SERPL-SCNC: 4.3 MMOL/L (ref 3.4–5.1)
PROCALCITONIN SERPL IA-MCNC: 18.61 NG/ML
PROCALCITONIN SERPL IA-MCNC: ABNORMAL NG/ML
PROT SERPL-MCNC: 5.5 G/DL (ref 6.4–8.2)
PROTHROMBIN TIME (PRT2): ABNORMAL
PROTHROMBIN TIME (PRT2): ABNORMAL
PROTHROMBIN TIME: 11.9 SEC (ref 9.7–11.8)
PROTHROMBIN TIME: 11.9 SEC (ref 9.7–11.8)
RBC # BLD: 3.15 MIL/MCL (ref 4–5.2)
RBC # BLD: 3.24 MIL/MCL (ref 4–5.2)
SAO2 % BLDA: 100 %
SAO2 % BLDA: 100 % (ref 95–99)
SERVICE CMNT-IMP: ABNORMAL
SERVICE CMNT-IMP: NORMAL
SERVICE CMNT-IMP: NORMAL
SODIUM BLD-SCNC: 134 MMOL/L (ref 135–145)
SODIUM BLD-SCNC: 135 MMOL/L (ref 135–145)
SODIUM BLD-SCNC: 135 MMOL/L (ref 135–145)
SODIUM SERPL-SCNC: 137 MMOL/L (ref 135–145)
STATUS OF UNIT: NORMAL
TRANSFUSION STATUS: NORMAL
UNIT DIVISION: 0
WBC # BLD: 21.9 K/MCL (ref 4.2–11)
WBC # BLD: 22.3 K/MCL (ref 4.2–11)
WBC MORPH BLD: NORMAL

## 2020-06-24 PROCEDURE — 99233 SBSQ HOSP IP/OBS HIGH 50: CPT | Performed by: INTERNAL MEDICINE

## 2020-06-24 PROCEDURE — 99291 CRITICAL CARE FIRST HOUR: CPT | Performed by: INTERNAL MEDICINE

## 2020-06-25 LAB
ALBUMIN SERPL-MCNC: 2.4 G/DL (ref 3.6–5.1)
ALBUMIN SERPL-MCNC: 2.4 G/DL (ref 3.6–5.1)
ALBUMIN/GLOB SERPL: 0.8 {RATIO} (ref 1–2.4)
ALBUMIN/GLOB SERPL: 0.9 {RATIO} (ref 1–2.4)
ALP SERPL-CCNC: 78 UNITS/L (ref 45–117)
ALP SERPL-CCNC: 80 UNITS/L (ref 45–117)
ALT SERPL-CCNC: 116 UNITS/L
ALT SERPL-CCNC: 118 UNITS/L
ANALYZER ANC (IANC): ABNORMAL
ANALYZER ANC (IANC): ABNORMAL
ANION GAP SERPL CALC-SCNC: 11 MMOL/L (ref 10–20)
ANION GAP SERPL CALC-SCNC: 12 MMOL/L (ref 10–20)
APTT PPP: 22 SEC (ref 22–32)
APTT PPP: NORMAL S
APTT PPP: NORMAL S
AST SERPL-CCNC: 124 UNITS/L
AST SERPL-CCNC: 147 UNITS/L
BASE DEFICIT BLDA-SCNC: ABNORMAL MMOL/L
BASE EXCESS BLDA CALC-SCNC: 0 MMOL/L (ref 0–3)
BASE EXCESS BLDA CALC-SCNC: 1 MMOL/L (ref 0–3)
BASE EXCESS BLDA CALC-SCNC: 4 MMOL/L (ref 0–3)
BASE EXCESS BLDA CALC-SCNC: 6 MMOL/L (ref 0–3)
BDY SITE: ABNORMAL
BILIRUB SERPL-MCNC: 1.2 MG/DL (ref 0.2–1)
BILIRUB SERPL-MCNC: 1.3 MG/DL (ref 0.2–1)
BLD PROD TYP BPU: NORMAL
BLD UNIT ID BPU: NORMAL
BLOOD BANK CMNT PATIENT-IMP: NORMAL
BLOOD BANK CMNT PATIENT-IMP: NORMAL
BODY TEMPERATURE: 36.9 DEGREES
BODY TEMPERATURE: 36.9 DEGREES
BODY TEMPERATURE: 37 DEGREES
BUN SERPL-MCNC: 43 MG/DL (ref 6–20)
BUN SERPL-MCNC: 61 MG/DL (ref 6–20)
BUN/CREAT SERPL: 13 (ref 7–25)
BUN/CREAT SERPL: 14 (ref 7–25)
CA-I BLD ISE-SCNC: 1.06 MMOL/L (ref 1.15–1.29)
CA-I BLDA-SCNC: 14 % (ref 15–23)
CA-I BLDA-SCNC: 14 ML/DL (ref 15–23)
CALCIUM SERPL-MCNC: 7.3 MG/DL (ref 8.4–10.2)
CALCIUM SERPL-MCNC: 7.4 MG/DL (ref 8.4–10.2)
CHLORIDE SERPL-SCNC: 103 MMOL/L (ref 98–107)
CHLORIDE SERPL-SCNC: 105 MMOL/L (ref 98–107)
CK SERPL-CCNC: 477 UNITS/L (ref 26–192)
CO2 SERPL-SCNC: 24 MMOL/L (ref 21–32)
CO2 SERPL-SCNC: 27 MMOL/L (ref 21–32)
COHGB MFR BLD: 0.5 %
COHGB MFR BLD: 0.8 %
COHGB MFR BLD: 1 %
COHGB MFR BLD: 1.1 %
CONDITION, POC: ABNORMAL
CONDITION-RC: ABNORMAL
CONDITION: ABNORMAL
CREAT SERPL-MCNC: 3.32 MG/DL (ref 0.51–0.95)
CREAT SERPL-MCNC: 4.3 MG/DL (ref 0.51–0.95)
CRP SERPL-MCNC: 1.2 MG/DL
D DIMER PPP FEU-MCNC: >35.2 MG/L (FEU)
D DIMER PPP FEU-MCNC: ABNORMAL
DEPRECATED SRA 0.1U/ML PORCINE HEPARIN S: 0 %
DEPRECATED SRA 100U/ML PORCINE HEPARIN S: 0 %
ERYTHROCYTE [DISTWIDTH] IN BLOOD: 14.5 % (ref 11–15)
ERYTHROCYTE [DISTWIDTH] IN BLOOD: 14.7 % (ref 11–15)
FERRITIN SERPL-MCNC: 450 NG/ML (ref 8–252)
FIBRINOGEN PPP-MCNC: 160 MG/DL (ref 190–425)
FIBRINOGEN PPP-MCNC: 184 MG/DL (ref 190–425)
GLOBULIN SER-MCNC: 2.6 G/DL (ref 2–4)
GLOBULIN SER-MCNC: 2.9 G/DL (ref 2–4)
GLUCOSE BLD-MCNC: 158 MG/DL (ref 70–99)
GLUCOSE BLDC GLUCOMTR-MCNC: 125 MG/DL (ref 70–99)
GLUCOSE BLDC GLUCOMTR-MCNC: 132 MG/DL (ref 70–99)
GLUCOSE BLDC GLUCOMTR-MCNC: 153 MG/DL (ref 70–99)
GLUCOSE BLDC GLUCOMTR-MCNC: 154 MG/DL (ref 70–99)
GLUCOSE BLDC GLUCOMTR-MCNC: ABNORMAL MG/DL
GLUCOSE BLDC GLUCOMTR-MCNC: ABNORMAL MG/DL
GLUCOSE SERPL-MCNC: 125 MG/DL (ref 65–99)
GLUCOSE SERPL-MCNC: 147 MG/DL (ref 65–99)
HCO3 BLDA-SCNC: 24 MMOL/L (ref 22–28)
HCO3 BLDA-SCNC: 25 MMOL/L (ref 22–28)
HCO3 BLDA-SCNC: 27 MMOL/L (ref 22–28)
HCO3 BLDA-SCNC: 28 MMOL/L (ref 22–28)
HCT VFR BLD CALC: 25.7 % (ref 36–46.5)
HCT VFR BLD CALC: 27.4 % (ref 36–46.5)
HCT VFR BLD CALC: 28 % (ref 36–46.5)
HCT VFR BLD CALC: ABNORMAL %
HGB BLD-MCNC: 8.7 G/DL (ref 12–15.5)
HGB BLD-MCNC: 9.3 G/DL (ref 12–15.5)
HGB BLD-MCNC: 9.3 G/DL (ref 12–15.5)
HGB BLD-MCNC: 9.6 G/DL (ref 12–15.5)
HGB BLD-MCNC: 9.7 G/DL (ref 12–15.5)
HGB BLD-MCNC: 9.8 G/DL (ref 12–15.5)
HGB BLD-MCNC: 9.9 G/DL (ref 12–15.5)
HOROWITZ INDEX BLD+IHG-RTO: ABNORMAL MM[HG]
INR PPP: 1.1
INR PPP: ABNORMAL
LACTATE BLDA-MCNC: 1.3 MMOL/L
LACTATE BLDA-MCNC: 1.5 MMOL/L
LACTATE BLDA-MCNC: 1.6 MMOL/L
LACTATE BLDA-MCNC: 1.6 MMOL/L
LDH SERPL L TO P-CCNC: 1213 UNITS/L (ref 82–240)
MAGNESIUM SERPL-MCNC: 2.1 MG/DL (ref 1.7–2.4)
MCH RBC QN AUTO: 28.7 PG (ref 26–34)
MCH RBC QN AUTO: 28.9 PG (ref 26–34)
MCHC RBC AUTO-ENTMCNC: 33.2 G/DL (ref 32–36.5)
MCHC RBC AUTO-ENTMCNC: 33.9 G/DL (ref 32–36.5)
MCV RBC AUTO: 84.8 FL (ref 78–100)
MCV RBC AUTO: 87 FL (ref 78–100)
METHGB MFR BLD: 1.9 %
METHGB MFR BLD: 2.7 %
METHGB MFR BLD: 2.8 %
METHGB MFR BLD: 3 %
NRBC (NRBCRE): 0 /100 WBC
NRBC (NRBCRE): 0 /100 WBC
NT-PROBNP SERPL-MCNC: ABNORMAL PG/ML
NUM BPU REQUESTED: 1
OXYHGB MFR BLD: 95.7 % (ref 94–98)
OXYHGB MFR BLD: 96.1 % (ref 94–98)
OXYHGB MFR BLD: 96.1 % (ref 94–98)
OXYHGB MFR BLDA: 97.1 % (ref 94–98)
PAO2 / FIO2 RATIO (RPFR): ABNORMAL
PCO2 BLDA: 31 MM HG (ref 32–45)
PCO2 BLDA: 33 MM HG (ref 32–45)
PH BLDA: 7.46 UNITS (ref 7.35–7.45)
PH BLDA: 7.48 UNITS (ref 7.35–7.45)
PH BLDA: 7.52 UNITS (ref 7.35–7.45)
PH BLDA: 7.56 UNITS (ref 7.35–7.45)
PHOSPHATE SERPL-MCNC: 3.8 MG/DL (ref 2.4–4.7)
PLATELET # BLD: 115 K/MCL (ref 140–450)
PLATELET # BLD: 120 K/MCL (ref 140–450)
PO2 BLDA: 227 MM HG (ref 83–108)
PO2 BLDA: 236 MM HG (ref 83–108)
PO2 BLDA: 238 MM HG (ref 83–108)
PO2 BLDA: 455 MM HG (ref 83–108)
POTASSIUM BLD-SCNC: 3.9 MMOL/L (ref 3.4–5.1)
POTASSIUM BLD-SCNC: 4 MMOL/L (ref 3.4–5.1)
POTASSIUM BLD-SCNC: 4.2 MMOL/L (ref 3.4–5.1)
POTASSIUM BLD-SCNC: 4.5 MMOL/L (ref 3.4–5.1)
POTASSIUM SERPL-SCNC: 4 MMOL/L (ref 3.4–5.1)
POTASSIUM SERPL-SCNC: 4.2 MMOL/L (ref 3.4–5.1)
PROCALCITONIN SERPL IA-MCNC: 13.55 NG/ML
PROCALCITONIN SERPL IA-MCNC: ABNORMAL NG/ML
PROT SERPL-MCNC: 5 G/DL (ref 6.4–8.2)
PROT SERPL-MCNC: 5.3 G/DL (ref 6.4–8.2)
PROTHROMBIN TIME (PRT2): ABNORMAL
PROTHROMBIN TIME: 11.9 SEC (ref 9.7–11.8)
RBC # BLD: 3.03 MIL/MCL (ref 4–5.2)
RBC # BLD: 3.22 MIL/MCL (ref 4–5.2)
SAO2 % BLDA: 100 %
SAO2 % BLDA: 100 % (ref 95–99)
SAO2 % BLDA: 100 % (ref 95–99)
SAO2 % BLDA: 99 % (ref 95–99)
SERVICE CMNT-IMP: ABNORMAL
SERVICE CMNT-IMP: ABNORMAL
SERVICE CMNT-IMP: NORMAL
SODIUM BLD-SCNC: 133 MMOL/L (ref 135–145)
SODIUM BLD-SCNC: 134 MMOL/L (ref 135–145)
SODIUM BLD-SCNC: 134 MMOL/L (ref 135–145)
SODIUM BLD-SCNC: 135 MMOL/L (ref 135–145)
SODIUM SERPL-SCNC: 137 MMOL/L (ref 135–145)
SODIUM SERPL-SCNC: 137 MMOL/L (ref 135–145)
SRA PORCINE INTERP SER-IMP: NEGATIVE
STATUS OF UNIT: NORMAL
TRANSFUSION STATUS: NORMAL
UNIT DIVISION: 0
WBC # BLD: 22.5 K/MCL (ref 4.2–11)
WBC # BLD: 23.4 K/MCL (ref 4.2–11)

## 2020-06-25 PROCEDURE — 99233 SBSQ HOSP IP/OBS HIGH 50: CPT | Performed by: INTERNAL MEDICINE

## 2020-06-25 PROCEDURE — 99291 CRITICAL CARE FIRST HOUR: CPT | Performed by: INTERNAL MEDICINE

## 2020-06-26 LAB
ALBUMIN SERPL-MCNC: 2.5 G/DL (ref 3.6–5.1)
ALBUMIN/GLOB SERPL: 0.9 {RATIO} (ref 1–2.4)
ALP SERPL-CCNC: 83 UNITS/L (ref 45–117)
ALT SERPL-CCNC: 111 UNITS/L
ANALYZER ANC (IANC): ABNORMAL
ANALYZER ANC (IANC): ABNORMAL
ANION GAP SERPL CALC-SCNC: 13 MMOL/L (ref 10–20)
ANION GAP SERPL CALC-SCNC: 14 MMOL/L (ref 10–20)
APTT PPP: 24 SEC (ref 22–32)
APTT PPP: NORMAL S
APTT PPP: NORMAL S
AST SERPL-CCNC: 116 UNITS/L
BASE DEFICIT BLDA-SCNC: 2 MMOL/L (ref 0–2)
BASE DEFICIT BLDA-SCNC: ABNORMAL MMOL/L
BASE EXCESS BLDA CALC-SCNC: 1 MMOL/L (ref 0–3)
BASE EXCESS BLDA CALC-SCNC: 1 MMOL/L (ref 0–3)
BASE EXCESS BLDA CALC-SCNC: 3 MMOL/L (ref 0–3)
BASE EXCESS BLDA CALC-SCNC: ABNORMAL MMOL/L
BDY SITE: ABNORMAL
BILIRUB SERPL-MCNC: 1.2 MG/DL (ref 0.2–1)
BLD PROD TYP BPU: NORMAL
BLD UNIT ID BPU: NORMAL
BLOOD BANK CMNT PATIENT-IMP: NORMAL
BLOOD BANK CMNT PATIENT-IMP: NORMAL
BODY TEMPERATURE: 36.6 DEGREES
BODY TEMPERATURE: 37.1 DEGREES
BODY TEMPERATURE: 37.4 DEGREES
BUN SERPL-MCNC: 56 MG/DL (ref 6–20)
BUN SERPL-MCNC: 60 MG/DL (ref 6–20)
BUN/CREAT SERPL: 15 (ref 7–25)
BUN/CREAT SERPL: 15 (ref 7–25)
CA-I BLD ISE-SCNC: 1.05 MMOL/L (ref 1.15–1.29)
CA-I BLD ISE-SCNC: 1.08 MMOL/L (ref 1.15–1.29)
CA-I BLD ISE-SCNC: 1.13 MMOL/L (ref 1.15–1.29)
CA-I BLD ISE-SCNC: 1.14 MMOL/L (ref 1.15–1.29)
CA-I BLDA-SCNC: 13 % (ref 15–23)
CA-I BLDA-SCNC: 13 % (ref 15–23)
CA-I BLDA-SCNC: 14 % (ref 15–23)
CA-I BLDA-SCNC: 14 ML/DL (ref 15–23)
CALCIUM SERPL-MCNC: 7.7 MG/DL (ref 8.4–10.2)
CALCIUM SERPL-MCNC: 8 MG/DL (ref 8.4–10.2)
CHLORIDE SERPL-SCNC: 101 MMOL/L (ref 98–107)
CHLORIDE SERPL-SCNC: 103 MMOL/L (ref 98–107)
CK SERPL-CCNC: 385 UNITS/L (ref 26–192)
CO2 SERPL-SCNC: 26 MMOL/L (ref 21–32)
CO2 SERPL-SCNC: 26 MMOL/L (ref 21–32)
COHGB MFR BLD: 0.4 %
COHGB MFR BLD: 1 %
COHGB MFR BLD: 1.1 %
COHGB MFR BLD: 1.3 %
CONDITION, POC: ABNORMAL
CONDITION-RC: ABNORMAL
CONDITION: ABNORMAL
CREAT SERPL-MCNC: 3.81 MG/DL (ref 0.51–0.95)
CREAT SERPL-MCNC: 4.13 MG/DL (ref 0.51–0.95)
CRP SERPL-MCNC: 1.9 MG/DL
D DIMER PPP FEU-MCNC: >35.2 MG/L (FEU)
D DIMER PPP FEU-MCNC: ABNORMAL
ERYTHROCYTE [DISTWIDTH] IN BLOOD: 14.6 % (ref 11–15)
ERYTHROCYTE [DISTWIDTH] IN BLOOD: 14.7 % (ref 11–15)
FERRITIN SERPL-MCNC: 442 NG/ML (ref 8–252)
FIBRINOGEN PPP-MCNC: 222 MG/DL (ref 190–425)
GLOBULIN SER-MCNC: 2.8 G/DL (ref 2–4)
GLUCOSE BLD-MCNC: 127 MG/DL (ref 70–99)
GLUCOSE BLDC GLUCOMTR-MCNC: 118 MG/DL (ref 70–99)
GLUCOSE BLDC GLUCOMTR-MCNC: 131 MG/DL (ref 70–99)
GLUCOSE BLDC GLUCOMTR-MCNC: 143 MG/DL (ref 70–99)
GLUCOSE BLDC GLUCOMTR-MCNC: 154 MG/DL (ref 70–99)
GLUCOSE BLDC GLUCOMTR-MCNC: ABNORMAL MG/DL
GLUCOSE BLDC GLUCOMTR-MCNC: ABNORMAL MG/DL
GLUCOSE SERPL-MCNC: 119 MG/DL (ref 65–99)
GLUCOSE SERPL-MCNC: 131 MG/DL (ref 65–99)
HCO3 BLDA-SCNC: 22 MMOL/L (ref 22–28)
HCO3 BLDA-SCNC: 25 MMOL/L (ref 22–28)
HCO3 BLDA-SCNC: 25 MMOL/L (ref 22–28)
HCO3 BLDA-SCNC: 26 MMOL/L (ref 22–28)
HCT VFR BLD CALC: 25.1 % (ref 36–46.5)
HCT VFR BLD CALC: 25.9 % (ref 36–46.5)
HCT VFR BLD CALC: 27 % (ref 36–46.5)
HCT VFR BLD CALC: ABNORMAL %
HGB BLD-MCNC: 8.6 G/DL (ref 12–15.5)
HGB BLD-MCNC: 8.8 G/DL (ref 12–15.5)
HGB BLD-MCNC: 9.1 G/DL (ref 12–15.5)
HGB BLD-MCNC: 9.2 G/DL (ref 12–15.5)
HGB BLD-MCNC: 9.3 G/DL (ref 12–15.5)
HGB BLD-MCNC: 9.4 G/DL (ref 12–15.5)
HGB BLD-MCNC: 9.8 G/DL (ref 12–15.5)
HOROWITZ INDEX BLD+IHG-RTO: ABNORMAL MM[HG]
INR PPP: 1.1
INR PPP: NORMAL
LACTATE BLDA-MCNC: 1.3 MMOL/L
LACTATE BLDA-MCNC: 1.4 MMOL/L
LACTATE BLDA-MCNC: 1.5 MMOL/L
LACTATE BLDA-MCNC: 2.1 MMOL/L
LDH SERPL L TO P-CCNC: 1050 UNITS/L (ref 82–240)
MAGNESIUM SERPL-MCNC: 2 MG/DL (ref 1.7–2.4)
MCH RBC QN AUTO: 28.7 PG (ref 26–34)
MCH RBC QN AUTO: 29.3 PG (ref 26–34)
MCHC RBC AUTO-ENTMCNC: 33.7 G/DL (ref 32–36.5)
MCHC RBC AUTO-ENTMCNC: 34 G/DL (ref 32–36.5)
MCV RBC AUTO: 85.2 FL (ref 78–100)
MCV RBC AUTO: 86.3 FL (ref 78–100)
METHGB MFR BLD: 2.2 %
METHGB MFR BLD: 2.5 %
METHGB MFR BLD: 2.6 %
METHGB MFR BLD: 2.9 %
NRBC (NRBCRE): 0 /100 WBC
NRBC (NRBCRE): 0 /100 WBC
NT-PROBNP SERPL-MCNC: ABNORMAL PG/ML
NUM BPU REQUESTED: 1
OXYHGB MFR BLD: 95.7 % (ref 94–98)
OXYHGB MFR BLD: 96.3 % (ref 94–98)
OXYHGB MFR BLD: 96.3 % (ref 94–98)
OXYHGB MFR BLDA: 96.5 % (ref 94–98)
PAO2 / FIO2 RATIO (RPFR): ABNORMAL
PCO2 BLDA: 32 MM HG (ref 32–45)
PCO2 BLDA: 33 MM HG (ref 32–45)
PCO2 BLDA: 34 MM HG (ref 32–45)
PCO2 BLDA: 35 MM HG (ref 32–45)
PH BLDA: 7.43 UNITS (ref 7.35–7.45)
PH BLDA: 7.46 UNITS (ref 7.35–7.45)
PH BLDA: 7.47 UNITS (ref 7.35–7.45)
PH BLDA: 7.51 UNITS (ref 7.35–7.45)
PHOSPHATE SERPL-MCNC: 3.9 MG/DL (ref 2.4–4.7)
PLATELET # BLD: 129 K/MCL (ref 140–450)
PLATELET # BLD: 140 K/MCL (ref 140–450)
PO2 BLDA: 178 MM HG (ref 83–108)
PO2 BLDA: 196 MM HG (ref 83–108)
PO2 BLDA: 209 MM HG (ref 83–108)
PO2 BLDA: 473 MM HG (ref 83–108)
POTASSIUM BLD-SCNC: 4.3 MMOL/L (ref 3.4–5.1)
POTASSIUM BLD-SCNC: 4.5 MMOL/L (ref 3.4–5.1)
POTASSIUM BLD-SCNC: 4.7 MMOL/L (ref 3.4–5.1)
POTASSIUM BLD-SCNC: 5.2 MMOL/L (ref 3.4–5.1)
POTASSIUM SERPL-SCNC: 4.4 MMOL/L (ref 3.4–5.1)
POTASSIUM SERPL-SCNC: 4.6 MMOL/L (ref 3.4–5.1)
PROCALCITONIN SERPL IA-MCNC: 10.89 NG/ML
PROCALCITONIN SERPL IA-MCNC: ABNORMAL NG/ML
PROT SERPL-MCNC: 5.3 G/DL (ref 6.4–8.2)
PROTHROMBIN TIME (PRT2): NORMAL
PROTHROMBIN TIME: 11.6 SEC (ref 9.7–11.8)
RBC # BLD: 3 MIL/MCL (ref 4–5.2)
RBC # BLD: 3.17 MIL/MCL (ref 4–5.2)
SAO2 % BLDA: 100 %
SAO2 % BLDA: 100 % (ref 95–99)
SAO2 % BLDA: 100 % (ref 95–99)
SAO2 % BLDA: 99 % (ref 95–99)
SERVICE CMNT-IMP: NORMAL
SERVICE CMNT-IMP: NORMAL
SODIUM BLD-SCNC: 131 MMOL/L (ref 135–145)
SODIUM BLD-SCNC: 132 MMOL/L (ref 135–145)
SODIUM BLD-SCNC: 133 MMOL/L (ref 135–145)
SODIUM BLD-SCNC: 134 MMOL/L (ref 135–145)
SODIUM SERPL-SCNC: 136 MMOL/L (ref 135–145)
SODIUM SERPL-SCNC: 138 MMOL/L (ref 135–145)
STATUS OF UNIT: NORMAL
TRANSFUSION STATUS: NORMAL
UNIT DIVISION: 0
WBC # BLD: 24.1 K/MCL (ref 4.2–11)
WBC # BLD: 27.1 K/MCL (ref 4.2–11)

## 2020-06-26 PROCEDURE — 93306 TTE W/DOPPLER COMPLETE: CPT | Performed by: INTERNAL MEDICINE

## 2020-06-26 PROCEDURE — 99233 SBSQ HOSP IP/OBS HIGH 50: CPT | Performed by: INTERNAL MEDICINE

## 2020-06-26 PROCEDURE — 99291 CRITICAL CARE FIRST HOUR: CPT | Performed by: INTERNAL MEDICINE

## 2020-06-27 LAB
25(OH)D3+25(OH)D2 SERPL-MCNC: 16 NG/ML (ref 30–100)
25(OH)D3+25(OH)D2 SERPL-MCNC: ABNORMAL NG/ML
ABO + RH BLD: NORMAL
ALBUMIN SERPL-MCNC: 2.9 G/DL (ref 3.6–5.1)
ALBUMIN/GLOB SERPL: 1.1 {RATIO} (ref 1–2.4)
ALP SERPL-CCNC: 74 UNITS/L (ref 45–117)
ALT SERPL-CCNC: 97 UNITS/L
ANALYZER ANC (IANC): ABNORMAL
ANALYZER ANC (IANC): ABNORMAL
ANION GAP SERPL CALC-SCNC: 10 MMOL/L (ref 10–20)
ANION GAP SERPL CALC-SCNC: 15 MMOL/L (ref 10–20)
APTT PPP: 22 SEC (ref 22–32)
APTT PPP: 26 SEC (ref 22–32)
APTT PPP: NORMAL S
AST SERPL-CCNC: 90 UNITS/L
BASE DEFICIT BLDA-SCNC: 1 MMOL/L (ref 0–2)
BASE DEFICIT BLDA-SCNC: 1 MMOL/L (ref 0–2)
BASE DEFICIT BLDA-SCNC: 2 MMOL/L (ref 0–2)
BASE DEFICIT BLDA-SCNC: ABNORMAL MMOL/L
BASE DEFICIT BLDMV-SCNC: 1 MMOL/L
BASE EXCESS BLDA CALC-SCNC: 1 MMOL/L (ref 0–3)
BASE EXCESS BLDA CALC-SCNC: 2 MMOL/L (ref 0–3)
BASE EXCESS BLDA CALC-SCNC: 2 MMOL/L (ref 0–3)
BASE EXCESS BLDA CALC-SCNC: ABNORMAL MMOL/L
BASE EXCESS-RC: ABNORMAL
BASOPHILS # BLD: 0 K/MCL (ref 0–0.3)
BASOPHILS NFR BLD: 0 %
BDY SITE: ABNORMAL
BILIRUB SERPL-MCNC: 1.8 MG/DL (ref 0.2–1)
BLD GP AB SCN SERPL QL: NEGATIVE
BLD PROD TYP BPU: NORMAL
BLD UNIT ID BPU: NORMAL
BLOOD BANK CMNT PATIENT-IMP: NORMAL
BLOOD BANK CMNT PATIENT-IMP: NORMAL
BODY TEMPERATURE: 36.8 DEGREES
BODY TEMPERATURE: 37 DEGREES
BODY TEMPERATURE: 37.1 DEGREES
BODY TEMPERATURE: 37.5 DEGREES
BUN SERPL-MCNC: 65 MG/DL (ref 6–20)
BUN SERPL-MCNC: 76 MG/DL (ref 6–20)
BUN/CREAT SERPL: 15 (ref 7–25)
BUN/CREAT SERPL: 15 (ref 7–25)
CA-I BLD ISE-SCNC: 1.07 MMOL/L (ref 1.15–1.29)
CA-I BLD ISE-SCNC: 1.07 MMOL/L (ref 1.15–1.29)
CA-I BLD ISE-SCNC: 1.09 MMOL/L (ref 1.15–1.29)
CA-I BLD ISE-SCNC: 1.09 MMOL/L (ref 1.15–1.29)
CA-I BLD ISE-SCNC: 1.11 MMOL/L (ref 1.15–1.29)
CA-I BLD ISE-SCNC: 1.12 MMOL/L (ref 1.15–1.29)
CA-I BLD ISE-SCNC: 1.18 MMOL/L (ref 1.15–1.29)
CA-I BLDA-SCNC: 11 % (ref 15–23)
CA-I BLDA-SCNC: 13 % (ref 15–23)
CA-I BLDA-SCNC: 14 % (ref 15–23)
CA-I BLDA-SCNC: 14 % (ref 15–23)
CA-I BLDA-SCNC: 14 ML/DL (ref 15–23)
CA-I BLDA-SCNC: 18 % (ref 15–23)
CALCIUM SERPL-MCNC: 7.7 MG/DL (ref 8.4–10.2)
CALCIUM SERPL-MCNC: 8.6 MG/DL (ref 8.4–10.2)
CHLORIDE SERPL-SCNC: 103 MMOL/L (ref 98–107)
CHLORIDE SERPL-SCNC: 104 MMOL/L (ref 98–107)
CK SERPL-CCNC: 406 UNITS/L (ref 26–192)
CMV DNA CSF QL NAA+NON-PROBE: 13 %
CO2 SERPL-SCNC: 22 MMOL/L (ref 21–32)
CO2 SERPL-SCNC: 25 MMOL/L (ref 21–32)
COHGB MFR BLD: 1 %
COHGB MFR BLD: 1.1 %
COHGB MFR BLD: 1.1 %
COHGB MFR BLD: 1.2 %
COHGB MFR BLD: 1.3 %
COHGB MFR BLD: 1.8 %
COHGB MFR BLDMV: 1.7 %
CONDITION, POC: ABNORMAL
CONDITION-RC: ABNORMAL
CONDITION: ABNORMAL
CREAT SERPL-MCNC: 4.29 MG/DL (ref 0.51–0.95)
CREAT SERPL-MCNC: 4.93 MG/DL (ref 0.51–0.95)
CROSSMATCH EXPIRE: NORMAL
CRP SERPL-MCNC: 4.4 MG/DL
D DIMER PPP FEU-MCNC: >35.2 MG/L (FEU)
D DIMER PPP FEU-MCNC: ABNORMAL
DIFFERENTIAL METHOD BLD: ABNORMAL
EOSINOPHIL # BLD: 0 K/MCL (ref 0.1–0.5)
EOSINOPHIL NFR BLD: 0 %
ERYTHROCYTE [DISTWIDTH] IN BLOOD: 15.7 % (ref 11–15)
ERYTHROCYTE [DISTWIDTH] IN BLOOD: 15.9 % (ref 11–15)
FERRITIN SERPL-MCNC: 416 NG/ML (ref 8–252)
FIBRINOGEN PPP-MCNC: 290 MG/DL (ref 190–425)
FIBRINOGEN PPP-MCNC: 386 MG/DL (ref 190–425)
FIBRINOGEN PPP-MCNC: 414 MG/DL (ref 190–425)
GLOBULIN SER-MCNC: 2.7 G/DL (ref 2–4)
GLUCOSE BLDC GLUCOMTR-MCNC: 110 MG/DL (ref 70–99)
GLUCOSE BLDC GLUCOMTR-MCNC: 113 MG/DL (ref 70–99)
GLUCOSE BLDC GLUCOMTR-MCNC: 130 MG/DL (ref 70–99)
GLUCOSE BLDC GLUCOMTR-MCNC: 95 MG/DL (ref 70–99)
GLUCOSE BLDC GLUCOMTR-MCNC: NORMAL MG/DL
GLUCOSE SERPL-MCNC: 102 MG/DL (ref 65–99)
GLUCOSE SERPL-MCNC: 93 MG/DL (ref 65–99)
HCO3 BLDA-SCNC: 21 MMOL/L (ref 22–28)
HCO3 BLDA-SCNC: 22 MMOL/L (ref 22–28)
HCO3 BLDA-SCNC: 23 MMOL/L (ref 22–28)
HCO3 BLDA-SCNC: 24 MMOL/L (ref 22–28)
HCO3 BLDA-SCNC: 25 MMOL/L (ref 22–28)
HCO3 BLDA-SCNC: 26 MMOL/L (ref 22–28)
HCO3 BLDMV-SCNC: 22 MMOL/L
HCT VFR BLD CALC: 23.6 % (ref 36–46.5)
HCT VFR BLD CALC: 25.7 % (ref 36–46.5)
HCT VFR BLD CALC: 26.3 % (ref 36–46.5)
HCT VFR BLD CALC: 28.6 % (ref 36–46.5)
HCT VFR BLD CALC: 28.8 % (ref 36–46.5)
HCT VFR BLD CALC: ABNORMAL %
HGB BLD-MCNC: 10 G/DL (ref 12–15.5)
HGB BLD-MCNC: 10 G/DL (ref 12–15.5)
HGB BLD-MCNC: 10.4 G/DL (ref 12–15.5)
HGB BLD-MCNC: 10.8 G/DL (ref 12–15.5)
HGB BLD-MCNC: 13.3 G/DL (ref 12–15.5)
HGB BLD-MCNC: 8.2 G/DL (ref 12–15.5)
HGB BLD-MCNC: 8.3 G/DL (ref 12–15.5)
HGB BLD-MCNC: 8.7 G/DL (ref 12–15.5)
HGB BLD-MCNC: 9.2 G/DL (ref 12–15.5)
HGB BLD-MCNC: 9.3 G/DL (ref 12–15.5)
HGB BLD-MCNC: 9.4 G/DL (ref 12–15.5)
HGB BLD-MCNC: 9.9 G/DL (ref 12–15.5)
HOROWITZ INDEX BLD+IHG-RTO: ABNORMAL MM[HG]
HYPOCHROMIA (HYPOC): ABNORMAL
INR PPP: 1
INR PPP: 1.1
INR PPP: NORMAL
INR PPP: NORMAL
LACTATE BLDA-MCNC: 0.7 MMOL/L
LACTATE BLDA-MCNC: 0.8 MMOL/L
LACTATE BLDA-MCNC: 0.8 MMOL/L
LACTATE BLDA-MCNC: 1.1 MMOL/L
LARGE PLATELETS (PLTL): PRESENT
LDH SERPL L TO P-CCNC: 951 UNITS/L (ref 82–240)
LYMPHOCYTES # BLD: 2 K/MCL (ref 1–4.8)
LYMPHOCYTES NFR BLD: 7 %
MAGNESIUM SERPL-MCNC: 2 MG/DL (ref 1.7–2.4)
MAGNESIUM SERPL-MCNC: 2.3 MG/DL (ref 1.7–2.4)
MAJ XM SERPL-IMP: NORMAL
MCH RBC QN AUTO: 29.8 PG (ref 26–34)
MCH RBC QN AUTO: 29.9 PG (ref 26–34)
MCHC RBC AUTO-ENTMCNC: 34.7 G/DL (ref 32–36.5)
MCHC RBC AUTO-ENTMCNC: 35 G/DL (ref 32–36.5)
MCV RBC AUTO: 85.1 FL (ref 78–100)
MCV RBC AUTO: 86 FL (ref 78–100)
METAMYELOCYTES NFR BLD: 2 % (ref 0–2)
METHGB MFR BLD: 1.6 %
METHGB MFR BLD: 1.7 %
METHGB MFR BLD: 1.7 %
METHGB MFR BLD: 2 %
METHGB MFR BLD: 2.9 %
METHGB MFR BLD: 3.1 %
METHGB MFR BLD: 3.3 %
MONOCYTES # BLD: 2.2 K/MCL (ref 0.3–0.9)
MONOCYTES NFR BLD: 9 %
NEUTROPHILS # BLD: 20.2 K/MCL (ref 1.8–7.7)
NEUTS SEG NFR BLD: 81 %
NRBC (NRBCRE): 0 /100 WBC
NRBC (NRBCRE): 0 /100 WBC
NT-PROBNP SERPL-MCNC: ABNORMAL PG/ML
NT-PROBNP SERPL-MCNC: ABNORMAL PG/ML
NUM BPU REQUESTED: 2
NUM BPU REQUESTED: 2
NUM BPU REQUESTED: 4
OVALOCYTES (OVALO): ABNORMAL
OXYHGB MFR BLD: 94.1 % (ref 94–98)
OXYHGB MFR BLD: 94.7 % (ref 94–98)
OXYHGB MFR BLD: 94.9 % (ref 94–98)
OXYHGB MFR BLD: 96 % (ref 94–98)
OXYHGB MFR BLD: 96.2 % (ref 94–98)
OXYHGB MFR BLDA: 95.1 % (ref 94–98)
OXYHGB MFR BLDMV: 86.2 %
PAO2 / FIO2 RATIO (RPFR): ABNORMAL
PATH REV BLD -IMP: ABNORMAL
PCO2 BLDA: 27 MM HG (ref 32–45)
PCO2 BLDA: 31 MM HG (ref 32–45)
PCO2 BLDA: 31 MM HG (ref 32–45)
PCO2 BLDA: 33 MM HG (ref 32–45)
PCO2 BLDA: 34 MM HG (ref 32–45)
PCO2 BLDA: 35 MM HG (ref 32–45)
PCO2 BLDMV: 33 MM HG
PH BLDA: 7.43 UNITS (ref 7.35–7.45)
PH BLDA: 7.47 UNITS (ref 7.35–7.45)
PH BLDA: 7.47 UNITS (ref 7.35–7.45)
PH BLDA: 7.48 UNITS (ref 7.35–7.45)
PH BLDA: 7.49 UNITS (ref 7.35–7.45)
PH BLDA: 7.5 UNITS (ref 7.35–7.45)
PH BLDMV: 7.44 UNITS
PHOSPHATE SERPL-MCNC: 3.5 MG/DL (ref 2.4–4.7)
PHOSPHATE SERPL-MCNC: 4.3 MG/DL (ref 2.4–4.7)
PLATELET # BLD: 137 K/MCL (ref 140–450)
PLATELET # BLD: 164 K/MCL (ref 140–450)
PO2 BLDA: 111 MM HG (ref 83–108)
PO2 BLDA: 129 MM HG (ref 83–108)
PO2 BLDA: 447 MM HG (ref 83–108)
PO2 BLDA: 71 MM HG (ref 83–108)
PO2 BLDA: 85 MM HG (ref 83–108)
PO2 BLDA: 90 MM HG (ref 83–108)
PO2 BLDMV: 52 MM HG
POLYCHROMASIA (POLY): ABNORMAL
POTASSIUM BLD-SCNC: 4.6 MMOL/L (ref 3.4–5.1)
POTASSIUM BLD-SCNC: 4.9 MMOL/L (ref 3.4–5.1)
POTASSIUM BLD-SCNC: 5.2 MMOL/L (ref 3.4–5.1)
POTASSIUM BLD-SCNC: 5.4 MMOL/L (ref 3.4–5.1)
POTASSIUM BLD-SCNC: 5.5 MMOL/L (ref 3.4–5.1)
POTASSIUM BLD-SCNC: 5.6 MMOL/L (ref 3.4–5.1)
POTASSIUM BLD-SCNC: 5.8 MMOL/L (ref 3.4–5.1)
POTASSIUM SERPL-SCNC: 4.5 MMOL/L (ref 3.4–5.1)
POTASSIUM SERPL-SCNC: 5.2 MMOL/L (ref 3.4–5.1)
PROCALCITONIN SERPL IA-MCNC: 11.08 NG/ML
PROCALCITONIN SERPL IA-MCNC: ABNORMAL NG/ML
PROT SERPL-MCNC: 5.6 G/DL (ref 6.4–8.2)
PROTHROMBIN TIME (PRT2): NORMAL
PROTHROMBIN TIME (PRT2): NORMAL
PROTHROMBIN TIME: 10.7 SEC (ref 9.7–11.8)
PROTHROMBIN TIME: 11.1 SEC (ref 9.7–11.8)
RBC # BLD: 3.09 MIL/MCL (ref 4–5.2)
RBC # BLD: 3.35 MIL/MCL (ref 4–5.2)
SAO2 % BLDA: 100 %
SAO2 % BLDA: 98 % (ref 95–99)
SAO2 % BLDA: 99 % (ref 95–99)
SAO2 % BLDMV: 89 %
SERVICE CMNT-IMP: NORMAL
SODIUM BLD-SCNC: 132 MMOL/L (ref 135–145)
SODIUM BLD-SCNC: 133 MMOL/L (ref 135–145)
SODIUM BLD-SCNC: 133 MMOL/L (ref 135–145)
SODIUM BLD-SCNC: 134 MMOL/L (ref 135–145)
SODIUM BLD-SCNC: 136 MMOL/L (ref 135–145)
SODIUM SERPL-SCNC: 134 MMOL/L (ref 135–145)
SODIUM SERPL-SCNC: 136 MMOL/L (ref 135–145)
STATUS OF UNIT: NORMAL
TOXIC GRANULATION (TOXIC): PRESENT
TOXIC VACUOLATION (TOXV): PRESENT
TRANSFUSION STATUS: NORMAL
UNIT DIVISION: 0
VARIANT LYMPHS NFR BLD: 1 % (ref 0–5)
WBC # BLD: 24.6 K/MCL (ref 4.2–11)
WBC # BLD: 24.9 K/MCL (ref 4.2–11)
WBC MORPH BLD: NORMAL

## 2020-06-27 PROCEDURE — 99233 SBSQ HOSP IP/OBS HIGH 50: CPT | Performed by: INTERNAL MEDICINE

## 2020-06-27 PROCEDURE — 99233 SBSQ HOSP IP/OBS HIGH 50: CPT | Performed by: FAMILY MEDICINE

## 2020-06-28 LAB
ALBUMIN SERPL-MCNC: 3.5 G/DL (ref 3.6–5.1)
ALBUMIN/GLOB SERPL: 1.2 {RATIO} (ref 1–2.4)
ALP SERPL-CCNC: 61 UNITS/L (ref 45–117)
ALT SERPL-CCNC: 74 UNITS/L
ANALYZER ANC (IANC): ABNORMAL
ANALYZER ANC (IANC): ABNORMAL
ANION GAP SERPL CALC-SCNC: 14 MMOL/L (ref 10–20)
ANION GAP SERPL CALC-SCNC: 15 MMOL/L (ref 10–20)
APTT PPP: 26 SEC (ref 22–32)
APTT PPP: 28 SEC (ref 22–32)
APTT PPP: NORMAL S
AST SERPL-CCNC: 71 UNITS/L
BASE DEFICIT BLDA-SCNC: 1 MMOL/L (ref 0–2)
BASE DEFICIT BLDA-SCNC: 2 MMOL/L (ref 0–2)
BASE DEFICIT BLDA-SCNC: ABNORMAL MMOL/L
BASE DEFICIT BLDA-SCNC: ABNORMAL MMOL/L
BASE EXCESS BLDA CALC-SCNC: 0 MMOL/L (ref 0–3)
BASE EXCESS BLDA CALC-SCNC: 0 MMOL/L (ref 0–3)
BASE EXCESS BLDA CALC-SCNC: ABNORMAL MMOL/L
BASE EXCESS BLDA CALC-SCNC: ABNORMAL MMOL/L
BASOPHILS # BLD: 0 K/MCL (ref 0–0.3)
BASOPHILS NFR BLD: 0 %
BDY SITE: ABNORMAL
BILIRUB SERPL-MCNC: 1.8 MG/DL (ref 0.2–1)
BLD PROD TYP BPU: NORMAL
BLD PROD TYP BPU: NORMAL
BLD UNIT ID BPU: NORMAL
BLD UNIT ID BPU: NORMAL
BLOOD BANK CMNT PATIENT-IMP: NORMAL
BODY TEMPERATURE: 36.9 DEGREES
BODY TEMPERATURE: 37 DEGREES
BODY TEMPERATURE: 37.3 DEGREES
BUN SERPL-MCNC: 61 MG/DL (ref 6–20)
BUN SERPL-MCNC: 73 MG/DL (ref 6–20)
BUN/CREAT SERPL: 16 (ref 7–25)
BUN/CREAT SERPL: 16 (ref 7–25)
BURR CELLS (BURC): ABNORMAL
CA-I BLD ISE-SCNC: 1.08 MMOL/L (ref 1.15–1.29)
CA-I BLD ISE-SCNC: 1.1 MMOL/L (ref 1.15–1.29)
CA-I BLD ISE-SCNC: 1.11 MMOL/L (ref 1.15–1.29)
CA-I BLD ISE-SCNC: 1.16 MMOL/L (ref 1.15–1.29)
CA-I BLDA-SCNC: 13 % (ref 15–23)
CA-I BLDA-SCNC: 14 ML/DL (ref 15–23)
CALCIUM SERPL-MCNC: 7.9 MG/DL (ref 8.4–10.2)
CALCIUM SERPL-MCNC: 8.4 MG/DL (ref 8.4–10.2)
CHLORIDE SERPL-SCNC: 101 MMOL/L (ref 98–107)
CHLORIDE SERPL-SCNC: 101 MMOL/L (ref 98–107)
CK SERPL-CCNC: 402 UNITS/L (ref 26–192)
CO2 SERPL-SCNC: 23 MMOL/L (ref 21–32)
CO2 SERPL-SCNC: 25 MMOL/L (ref 21–32)
COHGB MFR BLD: 0.6 %
COHGB MFR BLD: 0.8 %
COHGB MFR BLD: 0.9 %
COHGB MFR BLD: 1.4 %
CONDITION, POC: ABNORMAL
CONDITION-RC: ABNORMAL
CONDITION: ABNORMAL
CREAT SERPL-MCNC: 3.89 MG/DL (ref 0.51–0.95)
CREAT SERPL-MCNC: 4.7 MG/DL (ref 0.51–0.95)
CRP SERPL-MCNC: 12 MG/DL
D DIMER PPP FEU-MCNC: 34.25 MG/L (FEU)
D DIMER PPP FEU-MCNC: ABNORMAL
DIFFERENTIAL METHOD BLD: ABNORMAL
EOSINOPHIL # BLD: 0 K/MCL (ref 0.1–0.5)
EOSINOPHIL NFR BLD: 0 %
ERYTHROCYTE [DISTWIDTH] IN BLOOD: 16 % (ref 11–15)
ERYTHROCYTE [DISTWIDTH] IN BLOOD: 16.2 % (ref 11–15)
FERRITIN SERPL-MCNC: 496 NG/ML (ref 8–252)
FIBRINOGEN PPP-MCNC: 429 MG/DL (ref 190–425)
FIBRINOGEN PPP-MCNC: 472 MG/DL (ref 190–425)
GLOBULIN SER-MCNC: 2.8 G/DL (ref 2–4)
GLUCOSE BLD-MCNC: 133 MG/DL (ref 70–99)
GLUCOSE BLDC GLUCOMTR-MCNC: 100 MG/DL (ref 70–99)
GLUCOSE BLDC GLUCOMTR-MCNC: 103 MG/DL (ref 70–99)
GLUCOSE BLDC GLUCOMTR-MCNC: 114 MG/DL (ref 70–99)
GLUCOSE BLDC GLUCOMTR-MCNC: 116 MG/DL (ref 70–99)
GLUCOSE BLDC GLUCOMTR-MCNC: 125 MG/DL (ref 70–99)
GLUCOSE BLDC GLUCOMTR-MCNC: 144 MG/DL (ref 70–99)
GLUCOSE BLDC GLUCOMTR-MCNC: ABNORMAL MG/DL
GLUCOSE SERPL-MCNC: 106 MG/DL (ref 65–99)
GLUCOSE SERPL-MCNC: 117 MG/DL (ref 65–99)
HCO3 BLDA-SCNC: 22 MMOL/L (ref 22–28)
HCO3 BLDA-SCNC: 23 MMOL/L (ref 22–28)
HCO3 BLDA-SCNC: 23 MMOL/L (ref 22–28)
HCO3 BLDA-SCNC: 24 MMOL/L (ref 22–28)
HCT VFR BLD CALC: 26.1 % (ref 36–46.5)
HCT VFR BLD CALC: 27 % (ref 36–46.5)
HCT VFR BLD CALC: 27.5 % (ref 36–46.5)
HCT VFR BLD CALC: 27.5 % (ref 36–46.5)
HCT VFR BLD CALC: 29.8 % (ref 36–46.5)
HCT VFR BLD CALC: ABNORMAL %
HGB BLD-MCNC: 10.1 G/DL (ref 12–15.5)
HGB BLD-MCNC: 8.7 G/DL (ref 12–15.5)
HGB BLD-MCNC: 9.1 G/DL (ref 12–15.5)
HGB BLD-MCNC: 9.2 G/DL (ref 12–15.5)
HGB BLD-MCNC: 9.3 G/DL (ref 12–15.5)
HGB BLD-MCNC: 9.3 G/DL (ref 12–15.5)
HGB BLD-MCNC: 9.4 G/DL (ref 12–15.5)
HGB BLD-MCNC: 9.4 G/DL (ref 12–15.5)
HGB BLD-MCNC: 9.6 G/DL (ref 12–15.5)
HOROWITZ INDEX BLD+IHG-RTO: ABNORMAL MM[HG]
INR PPP: 1
INR PPP: 1
INR PPP: NORMAL
INR PPP: NORMAL
LACTATE BLDA-MCNC: 0.7 MMOL/L
LACTATE BLDA-MCNC: 1 MMOL/L
LDH SERPL L TO P-CCNC: 860 UNITS/L (ref 82–240)
LYMPHOCYTES # BLD: 2.7 K/MCL (ref 1–4.8)
LYMPHOCYTES NFR BLD: 10 %
MAGNESIUM SERPL-MCNC: 2.3 MG/DL (ref 1.7–2.4)
MAGNESIUM SERPL-MCNC: 2.7 MG/DL (ref 1.7–2.4)
MCH RBC QN AUTO: 29.5 PG (ref 26–34)
MCH RBC QN AUTO: 29.8 PG (ref 26–34)
MCHC RBC AUTO-ENTMCNC: 33.7 G/DL (ref 32–36.5)
MCHC RBC AUTO-ENTMCNC: 34.2 G/DL (ref 32–36.5)
MCV RBC AUTO: 87.3 FL (ref 78–100)
MCV RBC AUTO: 87.7 FL (ref 78–100)
METAMYELOCYTES NFR BLD: 1 % (ref 0–2)
METHGB MFR BLD: 1.8 %
METHGB MFR BLD: 1.9 %
METHGB MFR BLD: 2 %
METHGB MFR BLD: 2.1 %
MONOCYTES # BLD: 1.3 K/MCL (ref 0.3–0.9)
MONOCYTES NFR BLD: 5 %
MYELOCYTES NFR BLD: 1 %
NEUTROPHILS # BLD: 22 K/MCL (ref 1.8–7.7)
NEUTS SEG NFR BLD: 83 %
NRBC (NRBCRE): 0 /100 WBC
NRBC (NRBCRE): 0 /100 WBC
NT-PROBNP SERPL-MCNC: ABNORMAL PG/ML
NUM BPU REQUESTED: 1
NUM BPU REQUESTED: 1
NUM BPU REQUESTED: 2
OXYHGB MFR BLD: 95.8 % (ref 94–98)
OXYHGB MFR BLD: 96.2 % (ref 94–98)
OXYHGB MFR BLD: 96.7 % (ref 94–98)
OXYHGB MFR BLDA: 96.6 % (ref 94–98)
PAO2 / FIO2 RATIO (RPFR): ABNORMAL
PATH REV BLD -IMP: ABNORMAL
PCO2 BLDA: 25 MM HG (ref 32–45)
PCO2 BLDA: 33 MM HG (ref 32–45)
PCO2 BLDA: 34 MM HG (ref 32–45)
PCO2 BLDA: 35 MM HG (ref 32–45)
PH BLDA: 7.42 UNITS (ref 7.35–7.45)
PH BLDA: 7.43 UNITS (ref 7.35–7.45)
PH BLDA: 7.46 UNITS (ref 7.35–7.45)
PH BLDA: 7.55 UNITS (ref 7.35–7.45)
PHOSPHATE SERPL-MCNC: 4.6 MG/DL (ref 2.4–4.7)
PHOSPHATE SERPL-MCNC: 5 MG/DL (ref 2.4–4.7)
PLAT MORPH BLD: NORMAL
PLATELET # BLD: 156 K/MCL (ref 140–450)
PLATELET # BLD: 181 K/MCL (ref 140–450)
PO2 BLDA: 111 MM HG (ref 83–108)
PO2 BLDA: 169 MM HG (ref 83–108)
PO2 BLDA: 178 MM HG (ref 83–108)
PO2 BLDA: 546 MM HG (ref 83–108)
POTASSIUM BLD-SCNC: 5.2 MMOL/L (ref 3.4–5.1)
POTASSIUM BLD-SCNC: 5.6 MMOL/L (ref 3.4–5.1)
POTASSIUM BLD-SCNC: 5.9 MMOL/L (ref 3.4–5.1)
POTASSIUM BLD-SCNC: 6.1 MMOL/L (ref 3.4–5.1)
POTASSIUM SERPL-SCNC: 4.8 MMOL/L (ref 3.4–5.1)
POTASSIUM SERPL-SCNC: 5.6 MMOL/L (ref 3.4–5.1)
PROCALCITONIN SERPL IA-MCNC: 12.9 NG/ML
PROCALCITONIN SERPL IA-MCNC: ABNORMAL NG/ML
PROT SERPL-MCNC: 6.3 G/DL (ref 6.4–8.2)
PROTHROMBIN TIME (PRT2): NORMAL
PROTHROMBIN TIME (PRT2): NORMAL
PROTHROMBIN TIME: 10.4 SEC (ref 9.7–11.8)
PROTHROMBIN TIME: 10.4 SEC (ref 9.7–11.8)
RBC # BLD: 3.08 MIL/MCL (ref 4–5.2)
RBC # BLD: 3.15 MIL/MCL (ref 4–5.2)
SAO2 % BLDA: 100 %
SAO2 % BLDA: 100 % (ref 95–99)
SAO2 % BLDA: 99 % (ref 95–99)
SAO2 % BLDA: 99 % (ref 95–99)
SERVICE CMNT-IMP: ABNORMAL
SERVICE CMNT-IMP: ABNORMAL
SERVICE CMNT-IMP: NORMAL
SERVICE CMNT-IMP: NORMAL
SODIUM BLD-SCNC: 131 MMOL/L (ref 135–145)
SODIUM BLD-SCNC: 131 MMOL/L (ref 135–145)
SODIUM BLD-SCNC: 132 MMOL/L (ref 135–145)
SODIUM BLD-SCNC: 133 MMOL/L (ref 135–145)
SODIUM SERPL-SCNC: 133 MMOL/L (ref 135–145)
SODIUM SERPL-SCNC: 135 MMOL/L (ref 135–145)
STATUS OF UNIT: NORMAL
STATUS OF UNIT: NORMAL
TRANSFUSION STATUS: NORMAL
TRANSFUSION STATUS: NORMAL
UNIT DIVISION: 0
UNIT DIVISION: 0
WBC # BLD: 25.6 K/MCL (ref 4.2–11)
WBC # BLD: 26.5 K/MCL (ref 4.2–11)
WBC MORPH BLD: NORMAL

## 2020-06-28 PROCEDURE — 99233 SBSQ HOSP IP/OBS HIGH 50: CPT | Performed by: INTERNAL MEDICINE

## 2020-06-28 PROCEDURE — 99233 SBSQ HOSP IP/OBS HIGH 50: CPT | Performed by: FAMILY MEDICINE

## 2020-06-29 LAB
ALBUMIN SERPL-MCNC: 3.8 G/DL (ref 3.6–5.1)
ALBUMIN/GLOB SERPL: 1.3 {RATIO} (ref 1–2.4)
ALP SERPL-CCNC: 57 UNITS/L (ref 45–117)
ALT SERPL-CCNC: 60 UNITS/L
ANALYZER ANC (IANC): ABNORMAL
ANALYZER ANC (IANC): ABNORMAL
ANION GAP SERPL CALC-SCNC: 13 MMOL/L (ref 10–20)
ANION GAP SERPL CALC-SCNC: 14 MMOL/L (ref 10–20)
APTT PPP: 27 SEC (ref 22–32)
APTT PPP: NORMAL S
APTT PPP: NORMAL S
AST SERPL-CCNC: 49 UNITS/L
BASE DEFICIT BLDA-SCNC: 0 MMOL/L (ref 0–2)
BASE DEFICIT BLDA-SCNC: 0 MMOL/L (ref 0–2)
BASE DEFICIT BLDA-SCNC: 1 MMOL/L (ref 0–2)
BASE DEFICIT BLDA-SCNC: ABNORMAL MMOL/L
BASE EXCESS BLDA CALC-SCNC: 2 MMOL/L (ref 0–3)
BASE EXCESS BLDA CALC-SCNC: ABNORMAL MMOL/L
BASOPHILS # BLD: 0 K/MCL (ref 0–0.3)
BASOPHILS NFR BLD: 0 %
BDY SITE: ABNORMAL
BILIRUB SERPL-MCNC: 1.6 MG/DL (ref 0.2–1)
BLD PROD TYP BPU: NORMAL
BLD PROD TYP BPU: NORMAL
BLD UNIT ID BPU: NORMAL
BLD UNIT ID BPU: NORMAL
BODY TEMPERATURE: 36.7 DEGREES
BODY TEMPERATURE: 36.8 DEGREES
BODY TEMPERATURE: 37 DEGREES
BUN SERPL-MCNC: 43 MG/DL (ref 6–20)
BUN SERPL-MCNC: 50 MG/DL (ref 6–20)
BUN/CREAT SERPL: 15 (ref 7–25)
BUN/CREAT SERPL: 16 (ref 7–25)
CA-I BLD ISE-SCNC: 1.09 MMOL/L (ref 1.15–1.29)
CA-I BLD ISE-SCNC: 1.11 MMOL/L (ref 1.15–1.29)
CA-I BLD ISE-SCNC: 1.11 MMOL/L (ref 1.15–1.29)
CA-I BLD ISE-SCNC: 1.17 MMOL/L (ref 1.15–1.29)
CA-I BLDA-SCNC: 14 % (ref 15–23)
CA-I BLDA-SCNC: 14 ML/DL (ref 15–23)
CALCIUM SERPL-MCNC: 8.4 MG/DL (ref 8.4–10.2)
CALCIUM SERPL-MCNC: 8.7 MG/DL (ref 8.4–10.2)
CHLORIDE SERPL-SCNC: 100 MMOL/L (ref 98–107)
CHLORIDE SERPL-SCNC: 103 MMOL/L (ref 98–107)
CK SERPL-CCNC: 257 UNITS/L (ref 26–192)
CO2 SERPL-SCNC: 24 MMOL/L (ref 21–32)
CO2 SERPL-SCNC: 24 MMOL/L (ref 21–32)
COHGB MFR BLD: 1.1 %
COHGB MFR BLD: 1.2 %
COHGB MFR BLD: 1.4 %
COHGB MFR BLD: 2.3 %
CONDITION, POC: ABNORMAL
CONDITION-RC: ABNORMAL
CONDITION: ABNORMAL
CREAT SERPL-MCNC: 2.9 MG/DL (ref 0.51–0.95)
CREAT SERPL-MCNC: 3.22 MG/DL (ref 0.51–0.95)
CRP SERPL-MCNC: 18 MG/DL
D DIMER PPP FEU-MCNC: >35.2 MG/L (FEU)
D DIMER PPP FEU-MCNC: ABNORMAL
DIFFERENTIAL METHOD BLD: ABNORMAL
EOSINOPHIL # BLD: 0 K/MCL (ref 0.1–0.5)
EOSINOPHIL NFR BLD: 0 %
ERYTHROCYTE [DISTWIDTH] IN BLOOD: 15.3 % (ref 11–15)
ERYTHROCYTE [DISTWIDTH] IN BLOOD: 15.4 % (ref 11–15)
FERRITIN SERPL-MCNC: 530 NG/ML (ref 8–252)
FIBRINOGEN PPP-MCNC: 517 MG/DL (ref 190–425)
GLOBULIN SER-MCNC: 2.9 G/DL (ref 2–4)
GLUCOSE BLD-MCNC: 125 MG/DL (ref 70–99)
GLUCOSE BLDC GLUCOMTR-MCNC: 123 MG/DL (ref 70–99)
GLUCOSE BLDC GLUCOMTR-MCNC: 124 MG/DL (ref 70–99)
GLUCOSE BLDC GLUCOMTR-MCNC: 129 MG/DL (ref 70–99)
GLUCOSE BLDC GLUCOMTR-MCNC: 131 MG/DL (ref 70–99)
GLUCOSE BLDC GLUCOMTR-MCNC: ABNORMAL MG/DL
GLUCOSE BLDC GLUCOMTR-MCNC: ABNORMAL MG/DL
GLUCOSE SERPL-MCNC: 111 MG/DL (ref 65–99)
GLUCOSE SERPL-MCNC: 134 MG/DL (ref 65–99)
HCO3 BLDA-SCNC: 21 MMOL/L (ref 22–28)
HCO3 BLDA-SCNC: 23 MMOL/L (ref 22–28)
HCO3 BLDA-SCNC: 24 MMOL/L (ref 22–28)
HCO3 BLDA-SCNC: 26 MMOL/L (ref 22–28)
HCT VFR BLD CALC: 28.4 % (ref 36–46.5)
HCT VFR BLD CALC: 28.6 % (ref 36–46.5)
HCT VFR BLD CALC: 28.7 % (ref 36–46.5)
HCT VFR BLD CALC: ABNORMAL %
HGB BLD-MCNC: 10 G/DL (ref 12–15.5)
HGB BLD-MCNC: 10 G/DL (ref 12–15.5)
HGB BLD-MCNC: 9.6 G/DL (ref 12–15.5)
HGB BLD-MCNC: 9.7 G/DL (ref 12–15.5)
HGB BLD-MCNC: 9.9 G/DL (ref 12–15.5)
HOROWITZ INDEX BLD+IHG-RTO: ABNORMAL MM[HG]
INR PPP: 1
INR PPP: NORMAL
LACTATE BLDA-MCNC: 0.5 MMOL/L
LACTATE BLDA-MCNC: 0.5 MMOL/L
LACTATE BLDA-MCNC: 0.7 MMOL/L
LACTATE BLDA-MCNC: 1.1 MMOL/L
LARGE PLATELETS (PLTL): PRESENT
LDH SERPL L TO P-CCNC: 753 UNITS/L (ref 82–240)
LYMPHOCYTES # BLD: 1 K/MCL (ref 1–4.8)
LYMPHOCYTES NFR BLD: 2 %
MAGNESIUM SERPL-MCNC: 2.8 MG/DL (ref 1.7–2.4)
MAGNESIUM SERPL-MCNC: 2.8 MG/DL (ref 1.7–2.4)
MCH RBC QN AUTO: 29.2 PG (ref 26–34)
MCH RBC QN AUTO: 29.7 PG (ref 26–34)
MCHC RBC AUTO-ENTMCNC: 33.8 G/DL (ref 32–36.5)
MCHC RBC AUTO-ENTMCNC: 33.8 G/DL (ref 32–36.5)
MCV RBC AUTO: 86.4 FL (ref 78–100)
MCV RBC AUTO: 87.9 FL (ref 78–100)
METAMYELOCYTES NFR BLD: 3 % (ref 0–2)
METHGB MFR BLD: 1.3 %
METHGB MFR BLD: 1.6 %
METHGB MFR BLD: 1.8 %
METHGB MFR BLD: 2.1 %
MONOCYTES # BLD: 2.5 K/MCL (ref 0.3–0.9)
MONOCYTES NFR BLD: 10 %
MYELOCYTES NFR BLD: 4 %
NEUTROPHILS # BLD: 19.4 K/MCL (ref 1.8–7.7)
NEUTS BAND NFR BLD: 3 % (ref 0–10)
NEUTS SEG NFR BLD: 76 %
NRBC (NRBCRE): 0 /100 WBC
NRBC (NRBCRE): 0 /100 WBC
NT-PROBNP SERPL-MCNC: ABNORMAL PG/ML
NUM BPU REQUESTED: 2
OXYHGB MFR BLD: 96.3 % (ref 94–98)
OXYHGB MFR BLD: 96.4 % (ref 94–98)
OXYHGB MFR BLD: 96.4 % (ref 94–98)
OXYHGB MFR BLDA: 96.8 % (ref 94–98)
PAO2 / FIO2 RATIO (RPFR): ABNORMAL
PATH REV BLD -IMP: ABNORMAL
PCO2 BLDA: 25 MM HG (ref 32–45)
PCO2 BLDA: 34 MM HG (ref 32–45)
PCO2 BLDA: 35 MM HG (ref 32–45)
PCO2 BLDA: 36 MM HG (ref 32–45)
PH BLDA: 7.44 UNITS (ref 7.35–7.45)
PH BLDA: 7.44 UNITS (ref 7.35–7.45)
PH BLDA: 7.46 UNITS (ref 7.35–7.45)
PH BLDA: 7.54 UNITS (ref 7.35–7.45)
PHOSPHATE SERPL-MCNC: 3.9 MG/DL (ref 2.4–4.7)
PHOSPHATE SERPL-MCNC: 4.4 MG/DL (ref 2.4–4.7)
PLATELET # BLD: 152 K/MCL (ref 140–450)
PLATELET # BLD: 168 K/MCL (ref 140–450)
PO2 BLDA: 103 MM HG (ref 83–108)
PO2 BLDA: 138 MM HG (ref 83–108)
PO2 BLDA: 154 MM HG (ref 83–108)
PO2 BLDA: 435 MM HG (ref 83–108)
POTASSIUM BLD-SCNC: 4.4 MMOL/L (ref 3.4–5.1)
POTASSIUM BLD-SCNC: 4.5 MMOL/L (ref 3.4–5.1)
POTASSIUM BLD-SCNC: 4.7 MMOL/L (ref 3.4–5.1)
POTASSIUM BLD-SCNC: 5.4 MMOL/L (ref 3.4–5.1)
POTASSIUM SERPL-SCNC: 4.2 MMOL/L (ref 3.4–5.1)
POTASSIUM SERPL-SCNC: 4.8 MMOL/L (ref 3.4–5.1)
PROCALCITONIN SERPL IA-MCNC: 10.4 NG/ML
PROCALCITONIN SERPL IA-MCNC: ABNORMAL NG/ML
PROT SERPL-MCNC: 6.7 G/DL (ref 6.4–8.2)
PROTHROMBIN TIME (PRT2): NORMAL
PROTHROMBIN TIME: 10.2 SEC (ref 9.7–11.8)
RBC # BLD: 3.23 MIL/MCL (ref 4–5.2)
RBC # BLD: 3.32 MIL/MCL (ref 4–5.2)
SAO2 % BLDA: 100 %
SAO2 % BLDA: 100 % (ref 95–99)
SAO2 % BLDA: 100 % (ref 95–99)
SAO2 % BLDA: 99 % (ref 95–99)
SERVICE CMNT-IMP: ABNORMAL
SERVICE CMNT-IMP: NORMAL
SODIUM BLD-SCNC: 133 MMOL/L (ref 135–145)
SODIUM SERPL-SCNC: 133 MMOL/L (ref 135–145)
SODIUM SERPL-SCNC: 136 MMOL/L (ref 135–145)
STATUS OF UNIT: NORMAL
STATUS OF UNIT: NORMAL
TEAR DROP CELLS (TEARD): ABNORMAL
TRANSFUSION STATUS: NORMAL
TRANSFUSION STATUS: NORMAL
UNIT DIVISION: 0
UNIT DIVISION: 0
VARIANT LYMPHS NFR BLD: 2 % (ref 0–5)
WBC # BLD: 21.6 K/MCL (ref 4.2–11)
WBC # BLD: 24.6 K/MCL (ref 4.2–11)
WBC MORPH BLD: NORMAL

## 2020-06-29 PROCEDURE — 99233 SBSQ HOSP IP/OBS HIGH 50: CPT | Performed by: INTERNAL MEDICINE

## 2020-06-30 LAB
ABO + RH BLD: NORMAL
ALBUMIN SERPL-MCNC: 3.7 G/DL (ref 3.6–5.1)
ALBUMIN/GLOB SERPL: 1.2 {RATIO} (ref 1–2.4)
ALP SERPL-CCNC: 71 UNITS/L (ref 45–117)
ALT SERPL-CCNC: 50 UNITS/L
ANALYZER ANC (IANC): ABNORMAL
ANION GAP SERPL CALC-SCNC: 12 MMOL/L (ref 10–20)
ANION GAP SERPL CALC-SCNC: 14 MMOL/L (ref 10–20)
APTT PPP: 25 SEC (ref 22–32)
APTT PPP: NORMAL S
APTT PPP: NORMAL S
AST SERPL-CCNC: 42 UNITS/L
BASE DEFICIT BLDA-SCNC: ABNORMAL MMOL/L
BASE EXCESS BLDA CALC-SCNC: 0 MMOL/L (ref 0–3)
BASE EXCESS BLDA CALC-SCNC: 1 MMOL/L (ref 0–3)
BASE EXCESS BLDA CALC-SCNC: 2 MMOL/L (ref 0–3)
BASOPHILS # BLD: 0 K/MCL (ref 0–0.3)
BASOPHILS NFR BLD: 0 %
BDY SITE: ABNORMAL
BILIRUB SERPL-MCNC: 1.4 MG/DL (ref 0.2–1)
BLD GP AB SCN SERPL QL: NEGATIVE
BLD PROD TYP BPU: NORMAL
BLD UNIT ID BPU: NORMAL
BLOOD BANK CMNT PATIENT-IMP: NORMAL
BODY TEMPERATURE: 36.8 DEGREES
BODY TEMPERATURE: 36.9 DEGREES
BUN SERPL-MCNC: 42 MG/DL (ref 6–20)
BUN SERPL-MCNC: 46 MG/DL (ref 6–20)
BUN/CREAT SERPL: 16 (ref 7–25)
BUN/CREAT SERPL: 18 (ref 7–25)
CA-I BLD ISE-SCNC: 1.12 MMOL/L (ref 1.15–1.29)
CA-I BLD ISE-SCNC: 1.12 MMOL/L (ref 1.15–1.29)
CA-I BLD ISE-SCNC: 1.13 MMOL/L (ref 1.15–1.29)
CA-I BLDA-SCNC: 13 % (ref 15–23)
CA-I BLDA-SCNC: 14 % (ref 15–23)
CA-I BLDA-SCNC: 16 ML/DL (ref 15–23)
CALCIUM SERPL-MCNC: 8.5 MG/DL (ref 8.4–10.2)
CALCIUM SERPL-MCNC: 8.7 MG/DL (ref 8.4–10.2)
CHLORIDE SERPL-SCNC: 101 MMOL/L (ref 98–107)
CHLORIDE SERPL-SCNC: 102 MMOL/L (ref 98–107)
CK SERPL-CCNC: 200 UNITS/L (ref 26–192)
CO2 SERPL-SCNC: 24 MMOL/L (ref 21–32)
CO2 SERPL-SCNC: 27 MMOL/L (ref 21–32)
COHGB MFR BLD: 0.9 %
COHGB MFR BLD: 1.2 %
COHGB MFR BLD: 1.6 %
CONDITION, POC: ABNORMAL
CONDITION-RC: ABNORMAL
CONDITION-RC: ABNORMAL
CONDITION: ABNORMAL
CONDITION: ABNORMAL
CREAT SERPL-MCNC: 2.38 MG/DL (ref 0.51–0.95)
CREAT SERPL-MCNC: 2.83 MG/DL (ref 0.51–0.95)
CROSSMATCH EXPIRE: NORMAL
CRP SERPL-MCNC: 15 MG/DL
D DIMER PPP FEU-MCNC: >35.2 MG/L (FEU)
D DIMER PPP FEU-MCNC: ABNORMAL
DIFFERENTIAL METHOD BLD: ABNORMAL
EOSINOPHIL # BLD: 0 K/MCL (ref 0.1–0.5)
EOSINOPHIL NFR BLD: 0 %
ERYTHROCYTE [DISTWIDTH] IN BLOOD: 14.7 % (ref 11–15)
FERRITIN SERPL-MCNC: 554 NG/ML (ref 8–252)
FIBRINOGEN PPP-MCNC: 518 MG/DL (ref 190–425)
GLOBULIN SER-MCNC: 3.2 G/DL (ref 2–4)
GLUCOSE BLD-MCNC: 124 MG/DL (ref 70–99)
GLUCOSE BLDC GLUCOMTR-MCNC: 105 MG/DL (ref 70–99)
GLUCOSE BLDC GLUCOMTR-MCNC: 115 MG/DL (ref 70–99)
GLUCOSE BLDC GLUCOMTR-MCNC: 153 MG/DL (ref 70–99)
GLUCOSE BLDC GLUCOMTR-MCNC: ABNORMAL MG/DL
GLUCOSE SERPL-MCNC: 118 MG/DL (ref 65–99)
GLUCOSE SERPL-MCNC: 126 MG/DL (ref 65–99)
HCO3 BLDA-SCNC: 24 MMOL/L (ref 22–28)
HCO3 BLDA-SCNC: 25 MMOL/L (ref 22–28)
HCO3 BLDA-SCNC: 26 MMOL/L (ref 22–28)
HCT VFR BLD CALC: 28.7 % (ref 36–46.5)
HCT VFR BLD CALC: 30.1 % (ref 36–46.5)
HCT VFR BLD CALC: ABNORMAL %
HGB BLD-MCNC: 10 G/DL (ref 12–15.5)
HGB BLD-MCNC: 10.2 G/DL (ref 12–15.5)
HGB BLD-MCNC: 10.9 G/DL (ref 12–15.5)
HGB BLD-MCNC: 9.1 G/DL (ref 12–15.5)
HGB BLD-MCNC: 9.6 G/DL (ref 12–15.5)
HOROWITZ INDEX BLD+IHG-RTO: ABNORMAL MM[HG]
HOROWITZ INDEX BLD+IHG-RTO: ABNORMAL MM[HG]
INR PPP: 0.9
INR PPP: NORMAL
LACTATE BLDA-MCNC: 0.6 MMOL/L
LACTATE BLDA-MCNC: 0.7 MMOL/L
LACTATE BLDA-MCNC: 1 MMOL/L
LARGE PLATELETS (PLTL): PRESENT
LDH SERPL L TO P-CCNC: 692 UNITS/L (ref 82–240)
LYMPHOCYTES # BLD: 0.5 K/MCL (ref 1–4.8)
LYMPHOCYTES NFR BLD: 1 %
MAGNESIUM SERPL-MCNC: 2.9 MG/DL (ref 1.7–2.4)
MAGNESIUM SERPL-MCNC: 2.9 MG/DL (ref 1.7–2.4)
MAJ XM SERPL-IMP: NORMAL
MCH RBC QN AUTO: 29.6 PG (ref 26–34)
MCHC RBC AUTO-ENTMCNC: 33.2 G/DL (ref 32–36.5)
MCV RBC AUTO: 89.1 FL (ref 78–100)
METAMYELOCYTES NFR BLD: 2 % (ref 0–2)
METHGB MFR BLD: 1.5 %
METHGB MFR BLD: 1.7 %
METHGB MFR BLD: 2.3 %
MONOCYTES # BLD: 2.4 K/MCL (ref 0.3–0.9)
MONOCYTES NFR BLD: 10 %
MYELOCYTES NFR BLD: 2 %
NEUTROPHILS # BLD: 20.5 K/MCL (ref 1.8–7.7)
NEUTS SEG NFR BLD: 84 %
NRBC (NRBCRE): 0 /100 WBC
NT-PROBNP SERPL-MCNC: ABNORMAL PG/ML
NUM BPU REQUESTED: 2
NUM BPU REQUESTED: 4
NUM BPU REQUESTED: 6
OXYHGB MFR BLD: 96.3 % (ref 94–98)
OXYHGB MFR BLD: 96.5 % (ref 94–98)
OXYHGB MFR BLDA: 96.1 % (ref 94–98)
PAO2 / FIO2 RATIO (RPFR): 310 (ref 300–500)
PAO2 / FIO2 RATIO (RPFR): ABNORMAL
PATH REV BLD -IMP: ABNORMAL
PCO2 BLDA: 35 MM HG (ref 32–45)
PCO2 BLDA: 37 MM HG (ref 32–45)
PCO2 BLDA: 38 MM HG (ref 32–45)
PH BLDA: 7.43 UNITS (ref 7.35–7.45)
PH BLDA: 7.44 UNITS (ref 7.35–7.45)
PH BLDA: 7.45 UNITS (ref 7.35–7.45)
PHOSPHATE SERPL-MCNC: 3.5 MG/DL (ref 2.4–4.7)
PHOSPHATE SERPL-MCNC: 4.6 MG/DL (ref 2.4–4.7)
PLATELET # BLD: 195 K/MCL (ref 140–450)
PO2 BLDA: 123 MM HG (ref 83–108)
PO2 BLDA: 123 MM HG (ref 83–108)
PO2 BLDA: 480 MM HG (ref 83–108)
POTASSIUM BLD-SCNC: 4 MMOL/L (ref 3.4–5.1)
POTASSIUM BLD-SCNC: 4.5 MMOL/L (ref 3.4–5.1)
POTASSIUM BLD-SCNC: 4.8 MMOL/L (ref 3.4–5.1)
POTASSIUM SERPL-SCNC: 3.9 MMOL/L (ref 3.4–5.1)
POTASSIUM SERPL-SCNC: 4.5 MMOL/L (ref 3.4–5.1)
PROCALCITONIN SERPL IA-MCNC: 7.17 NG/ML
PROCALCITONIN SERPL IA-MCNC: ABNORMAL NG/ML
PROT SERPL-MCNC: 6.9 G/DL (ref 6.4–8.2)
PROTHROMBIN TIME (PRT2): NORMAL
PROTHROMBIN TIME: 9.7 SEC (ref 9.7–11.8)
RBC # BLD: 3.38 MIL/MCL (ref 4–5.2)
RBC MORPH BLD: NORMAL
SAO2 % BLDA: 100 %
SAO2 % BLDA: 99 % (ref 95–99)
SAO2 % BLDA: 99 % (ref 95–99)
SERVICE CMNT-IMP: ABNORMAL
SERVICE CMNT-IMP: NORMAL
SODIUM BLD-SCNC: 131 MMOL/L (ref 135–145)
SODIUM BLD-SCNC: 132 MMOL/L (ref 135–145)
SODIUM BLD-SCNC: 134 MMOL/L (ref 135–145)
SODIUM SERPL-SCNC: 135 MMOL/L (ref 135–145)
SODIUM SERPL-SCNC: 136 MMOL/L (ref 135–145)
STATUS OF UNIT: NORMAL
TRANSFUSION STATUS: NORMAL
UNIT DIVISION: 0
VARIANT LYMPHS NFR BLD: 1 % (ref 0–5)
WBC # BLD: 24.4 K/MCL (ref 4.2–11)
WBC MORPH BLD: NORMAL

## 2020-06-30 PROCEDURE — 99233 SBSQ HOSP IP/OBS HIGH 50: CPT | Performed by: INTERNAL MEDICINE

## 2020-07-01 LAB
ALBUMIN SERPL-MCNC: 3.1 G/DL (ref 3.6–5.1)
ALBUMIN SERPL-MCNC: 3.2 G/DL (ref 3.6–5.1)
ALBUMIN/GLOB SERPL: 0.9 {RATIO} (ref 1–2.4)
ALBUMIN/GLOB SERPL: 0.9 {RATIO} (ref 1–2.4)
ALP SERPL-CCNC: 72 UNITS/L (ref 45–117)
ALP SERPL-CCNC: 73 UNITS/L (ref 45–117)
ALT SERPL-CCNC: 37 UNITS/L
ALT SERPL-CCNC: 41 UNITS/L
ANALYZER ANC (IANC): ABNORMAL
ANALYZER ANC (IANC): ABNORMAL
ANION GAP SERPL CALC-SCNC: 10 MMOL/L (ref 10–20)
ANION GAP SERPL CALC-SCNC: 12 MMOL/L (ref 10–20)
APTT PPP: 27 SEC (ref 22–32)
APTT PPP: 29 SEC (ref 22–32)
APTT PPP: NORMAL S
AST SERPL-CCNC: 33 UNITS/L
AST SERPL-CCNC: 34 UNITS/L
BASE DEFICIT BLDA-SCNC: 0 MMOL/L (ref 0–2)
BASE DEFICIT BLDA-SCNC: 1 MMOL/L (ref 0–2)
BASE DEFICIT BLDA-SCNC: 1 MMOL/L (ref 0–2)
BASE DEFICIT BLDA-SCNC: 2 MMOL/L (ref 0–2)
BASE DEFICIT BLDA-SCNC: 2 MMOL/L (ref 0–2)
BASE DEFICIT BLDA-SCNC: 5 MMOL/L (ref 0–2)
BASE DEFICIT BLDA-SCNC: ABNORMAL MMOL/L
BASE EXCESS BLDA CALC-SCNC: 2 MMOL/L (ref 0–3)
BASE EXCESS BLDA CALC-SCNC: ABNORMAL MMOL/L
BASOPHILS # BLD: 0 K/MCL (ref 0–0.3)
BASOPHILS # BLD: 0 K/MCL (ref 0–0.3)
BASOPHILS NFR BLD: 0 %
BASOPHILS NFR BLD: 0 %
BDY SITE: ABNORMAL
BILIRUB SERPL-MCNC: 1.4 MG/DL (ref 0.2–1)
BILIRUB SERPL-MCNC: 1.6 MG/DL (ref 0.2–1)
BODY TEMPERATURE: 36.7 DEGREES
BODY TEMPERATURE: 37 DEGREES
BUN SERPL-MCNC: 41 MG/DL (ref 6–20)
BUN SERPL-MCNC: 48 MG/DL (ref 6–20)
BUN/CREAT SERPL: 17 (ref 7–25)
BUN/CREAT SERPL: 18 (ref 7–25)
BURR CELLS (BURC): ABNORMAL
CA-I BLD ISE-SCNC: 1.08 MMOL/L (ref 1.15–1.29)
CA-I BLD ISE-SCNC: 1.12 MMOL/L (ref 1.15–1.29)
CA-I BLD ISE-SCNC: 1.12 MMOL/L (ref 1.15–1.29)
CA-I BLD ISE-SCNC: 1.13 MMOL/L (ref 1.15–1.29)
CA-I BLD ISE-SCNC: 1.14 MMOL/L (ref 1.15–1.29)
CA-I BLD ISE-SCNC: 1.15 MMOL/L (ref 1.15–1.29)
CA-I BLD ISE-SCNC: 1.15 MMOL/L (ref 1.15–1.29)
CA-I BLDA-SCNC: 13 % (ref 15–23)
CA-I BLDA-SCNC: 13 % (ref 15–23)
CA-I BLDA-SCNC: 14 % (ref 15–23)
CA-I BLDA-SCNC: 15 ML/DL (ref 15–23)
CALCIUM SERPL-MCNC: 8.3 MG/DL (ref 8.4–10.2)
CALCIUM SERPL-MCNC: 8.5 MG/DL (ref 8.4–10.2)
CHLORIDE SERPL-SCNC: 103 MMOL/L (ref 98–107)
CHLORIDE SERPL-SCNC: 103 MMOL/L (ref 98–107)
CK SERPL-CCNC: 99 UNITS/L (ref 26–192)
CO2 SERPL-SCNC: 24 MMOL/L (ref 21–32)
CO2 SERPL-SCNC: 26 MMOL/L (ref 21–32)
COHGB MFR BLD: 0.7 %
COHGB MFR BLD: 0.9 %
COHGB MFR BLD: 1.1 %
COHGB MFR BLD: 1.1 %
COHGB MFR BLD: 1.2 %
CONDITION, POC: ABNORMAL
CONDITION-RC: ABNORMAL
CONDITION: ABNORMAL
CREAT SERPL-MCNC: 2.3 MG/DL (ref 0.51–0.95)
CREAT SERPL-MCNC: 2.85 MG/DL (ref 0.51–0.95)
CRP SERPL-MCNC: 18 MG/DL
D DIMER PPP FEU-MCNC: >35.2 MG/L (FEU)
D DIMER PPP FEU-MCNC: ABNORMAL
DIFFERENTIAL METHOD BLD: ABNORMAL
DIFFERENTIAL METHOD BLD: ABNORMAL
EOSINOPHIL # BLD: 0.2 K/MCL (ref 0.1–0.5)
EOSINOPHIL # BLD: 0.3 K/MCL (ref 0.1–0.5)
EOSINOPHIL NFR BLD: 1 %
EOSINOPHIL NFR BLD: 1 %
ERYTHROCYTE [DISTWIDTH] IN BLOOD: 14.4 % (ref 11–15)
ERYTHROCYTE [DISTWIDTH] IN BLOOD: 14.8 % (ref 11–15)
FERRITIN SERPL-MCNC: 651 NG/ML (ref 8–252)
FIBRINOGEN PPP-MCNC: 525 MG/DL (ref 190–425)
FIBRINOGEN PPP-MCNC: 569 MG/DL (ref 190–425)
GLOBULIN SER-MCNC: 3.4 G/DL (ref 2–4)
GLOBULIN SER-MCNC: 3.5 G/DL (ref 2–4)
GLUCOSE BLD-MCNC: 131 MG/DL (ref 70–99)
GLUCOSE BLDC GLUCOMTR-MCNC: 116 MG/DL (ref 70–99)
GLUCOSE BLDC GLUCOMTR-MCNC: 122 MG/DL (ref 70–99)
GLUCOSE BLDC GLUCOMTR-MCNC: ABNORMAL MG/DL
GLUCOSE SERPL-MCNC: 105 MG/DL (ref 65–99)
GLUCOSE SERPL-MCNC: 126 MG/DL (ref 65–99)
HCO3 BLDA-SCNC: 21 MMOL/L (ref 22–28)
HCO3 BLDA-SCNC: 22 MMOL/L (ref 22–28)
HCO3 BLDA-SCNC: 23 MMOL/L (ref 22–28)
HCO3 BLDA-SCNC: 24 MMOL/L (ref 22–28)
HCO3 BLDA-SCNC: 26 MMOL/L (ref 22–28)
HCT VFR BLD CALC: 27.5 % (ref 36–46.5)
HCT VFR BLD CALC: 28 % (ref 36–46.5)
HCT VFR BLD CALC: 28.8 % (ref 36–46.5)
HCT VFR BLD CALC: ABNORMAL %
HGB BLD-MCNC: 10.2 G/DL (ref 12–15.5)
HGB BLD-MCNC: 9.2 G/DL (ref 12–15.5)
HGB BLD-MCNC: 9.3 G/DL (ref 12–15.5)
HGB BLD-MCNC: 9.6 G/DL (ref 12–15.5)
HGB BLD-MCNC: 9.7 G/DL (ref 12–15.5)
HGB BLD-MCNC: 9.7 G/DL (ref 12–15.5)
HGB BLD-MCNC: 9.8 G/DL (ref 12–15.5)
HGB BLD-MCNC: 9.9 G/DL (ref 12–15.5)
HOROWITZ INDEX BLD+IHG-RTO: ABNORMAL MM[HG]
HYPOCHROMIA (HYPOC): ABNORMAL
HYPOCHROMIA (HYPOC): ABNORMAL
INR PPP: 1
INR PPP: 1
INR PPP: NORMAL
INR PPP: NORMAL
LACTATE BLDA-MCNC: 0.4 MMOL/L
LACTATE BLDA-MCNC: 0.5 MMOL/L
LACTATE BLDA-MCNC: 0.8 MMOL/L
LACTATE BLDA-MCNC: 0.8 MMOL/L
LARGE PLATELETS (PLTL): PRESENT
LARGE PLATELETS (PLTL): PRESENT
LDH SERPL L TO P-CCNC: 625 UNITS/L (ref 82–240)
LYMPHOCYTES # BLD: 1 K/MCL (ref 1–4.8)
LYMPHOCYTES # BLD: 1.9 K/MCL (ref 1–4.8)
LYMPHOCYTES NFR BLD: 4 %
LYMPHOCYTES NFR BLD: 7 %
MAGNESIUM SERPL-MCNC: 2.8 MG/DL (ref 1.7–2.4)
MAGNESIUM SERPL-MCNC: 2.8 MG/DL (ref 1.7–2.4)
MCH RBC QN AUTO: 28.9 PG (ref 26–34)
MCH RBC QN AUTO: 29.3 PG (ref 26–34)
MCHC RBC AUTO-ENTMCNC: 32.9 G/DL (ref 32–36.5)
MCHC RBC AUTO-ENTMCNC: 33.7 G/DL (ref 32–36.5)
MCV RBC AUTO: 87 FL (ref 78–100)
MCV RBC AUTO: 88.1 FL (ref 78–100)
METAMYELOCYTES NFR BLD: 2 % (ref 0–2)
METHGB MFR BLD: 1.2 %
METHGB MFR BLD: 1.5 %
METHGB MFR BLD: 1.6 %
METHGB MFR BLD: 1.9 %
METHGB MFR BLD: 2 %
METHGB MFR BLD: 2 %
METHGB MFR BLD: 2.3 %
MONOCYTES # BLD: 1.5 K/MCL (ref 0.3–0.9)
MONOCYTES # BLD: 1.7 K/MCL (ref 0.3–0.9)
MONOCYTES NFR BLD: 6 %
MONOCYTES NFR BLD: 7 %
MYELOCYTES NFR BLD: 2 %
MYELOCYTES NFR BLD: 4 %
NEUTROPHILS # BLD: 19 K/MCL (ref 1.8–7.7)
NEUTROPHILS # BLD: 21.5 K/MCL (ref 1.8–7.7)
NEUTS BAND NFR BLD: 1 % (ref 0–10)
NEUTS BAND NFR BLD: 1 % (ref 0–10)
NEUTS SEG NFR BLD: 78 %
NEUTS SEG NFR BLD: 84 %
NRBC (NRBCRE): 0 /100 WBC
NRBC (NRBCRE): 0 /100 WBC
NT-PROBNP SERPL-MCNC: ABNORMAL PG/ML
NT-PROBNP SERPL-MCNC: ABNORMAL PG/ML
OXYHGB MFR BLD: 95.8 % (ref 94–98)
OXYHGB MFR BLD: 95.9 % (ref 94–98)
OXYHGB MFR BLD: 96.1 % (ref 94–98)
OXYHGB MFR BLD: 96.4 % (ref 94–98)
OXYHGB MFR BLD: 97 % (ref 94–98)
OXYHGB MFR BLD: 97.2 % (ref 94–98)
OXYHGB MFR BLDA: 97.6 % (ref 94–98)
PAO2 / FIO2 RATIO (RPFR): ABNORMAL
PATH REV BLD -IMP: ABNORMAL
PATH REV BLD -IMP: ABNORMAL
PCO2 BLDA: 33 MM HG (ref 32–45)
PCO2 BLDA: 34 MM HG (ref 32–45)
PCO2 BLDA: 36 MM HG (ref 32–45)
PCO2 BLDA: 36 MM HG (ref 32–45)
PCO2 BLDA: 37 MM HG (ref 32–45)
PCO2 BLDA: 38 MM HG (ref 32–45)
PCO2 BLDA: 44 MM HG (ref 32–45)
PH BLDA: 7.29 UNITS (ref 7.35–7.45)
PH BLDA: 7.38 UNITS (ref 7.35–7.45)
PH BLDA: 7.41 UNITS (ref 7.35–7.45)
PH BLDA: 7.43 UNITS (ref 7.35–7.45)
PH BLDA: 7.43 UNITS (ref 7.35–7.45)
PH BLDA: 7.44 UNITS (ref 7.35–7.45)
PH BLDA: 7.45 UNITS (ref 7.35–7.45)
PHOSPHATE SERPL-MCNC: 3.6 MG/DL (ref 2.4–4.7)
PHOSPHATE SERPL-MCNC: 5.9 MG/DL (ref 2.4–4.7)
PLATELET # BLD: 191 K/MCL (ref 140–450)
PLATELET # BLD: 197 K/MCL (ref 140–450)
PO2 BLDA: 108 MM HG (ref 83–108)
PO2 BLDA: 117 MM HG (ref 83–108)
PO2 BLDA: 122 MM HG (ref 83–108)
PO2 BLDA: 174 MM HG (ref 83–108)
PO2 BLDA: 176 MM HG (ref 83–108)
PO2 BLDA: 275 MM HG (ref 83–108)
PO2 BLDA: >550 MM HG (ref 83–108)
POLYCHROMASIA (POLY): ABNORMAL
POTASSIUM BLD-SCNC: 4 MMOL/L (ref 3.4–5.1)
POTASSIUM BLD-SCNC: 4 MMOL/L (ref 3.4–5.1)
POTASSIUM BLD-SCNC: 4.1 MMOL/L (ref 3.4–5.1)
POTASSIUM BLD-SCNC: 4.4 MMOL/L (ref 3.4–5.1)
POTASSIUM BLD-SCNC: 4.6 MMOL/L (ref 3.4–5.1)
POTASSIUM BLD-SCNC: 4.7 MMOL/L (ref 3.4–5.1)
POTASSIUM BLD-SCNC: 5 MMOL/L (ref 3.4–5.1)
POTASSIUM SERPL-SCNC: 3.8 MMOL/L (ref 3.4–5.1)
POTASSIUM SERPL-SCNC: 4.7 MMOL/L (ref 3.4–5.1)
PROMYELOCYTES NFR BLD: 1 %
PROT SERPL-MCNC: 6.6 G/DL (ref 6.4–8.2)
PROT SERPL-MCNC: 6.6 G/DL (ref 6.4–8.2)
PROTHROMBIN TIME (PRT2): NORMAL
PROTHROMBIN TIME (PRT2): NORMAL
PROTHROMBIN TIME: 10.1 SEC (ref 9.7–11.8)
PROTHROMBIN TIME: 10.4 SEC (ref 9.7–11.8)
RBC # BLD: 3.18 MIL/MCL (ref 4–5.2)
RBC # BLD: 3.31 MIL/MCL (ref 4–5.2)
SAO2 % BLDA: 100 %
SAO2 % BLDA: 100 % (ref 95–99)
SAO2 % BLDA: 99 % (ref 95–99)
SERVICE CMNT-IMP: ABNORMAL
SERVICE CMNT-IMP: ABNORMAL
SODIUM BLD-SCNC: 130 MMOL/L (ref 135–145)
SODIUM BLD-SCNC: 131 MMOL/L (ref 135–145)
SODIUM BLD-SCNC: 132 MMOL/L (ref 135–145)
SODIUM BLD-SCNC: 133 MMOL/L (ref 135–145)
SODIUM BLD-SCNC: 135 MMOL/L (ref 135–145)
SODIUM SERPL-SCNC: 134 MMOL/L (ref 135–145)
SODIUM SERPL-SCNC: 135 MMOL/L (ref 135–145)
TOXIC GRANULATION (TOXIC): PRESENT
VARIANT LYMPHS NFR BLD: 1 % (ref 0–5)
WBC # BLD: 24.1 K/MCL (ref 4.2–11)
WBC # BLD: 25.3 K/MCL (ref 4.2–11)
WBC MORPH BLD: NORMAL
WBC MORPH BLD: NORMAL

## 2020-07-01 PROCEDURE — 99233 SBSQ HOSP IP/OBS HIGH 50: CPT | Performed by: INTERNAL MEDICINE

## 2020-07-02 LAB
ALBUMIN SERPL-MCNC: 2.8 G/DL (ref 3.6–5.1)
ALBUMIN SERPL-MCNC: 2.8 G/DL (ref 3.6–5.1)
ALBUMIN/GLOB SERPL: 0.8 {RATIO} (ref 1–2.4)
ALBUMIN/GLOB SERPL: 0.8 {RATIO} (ref 1–2.4)
ALP SERPL-CCNC: 69 UNITS/L (ref 45–117)
ALP SERPL-CCNC: 75 UNITS/L (ref 45–117)
ALT SERPL-CCNC: 26 UNITS/L
ALT SERPL-CCNC: 30 UNITS/L
ANALYZER ANC (IANC): ABNORMAL
ANION GAP SERPL CALC-SCNC: 11 MMOL/L (ref 10–20)
ANION GAP SERPL CALC-SCNC: 13 MMOL/L (ref 10–20)
APTT PPP: 29 SEC (ref 22–32)
APTT PPP: NORMAL S
APTT PPP: NORMAL S
AST SERPL-CCNC: 30 UNITS/L
AST SERPL-CCNC: 31 UNITS/L
BASE DEFICIT BLDA-SCNC: 0 MMOL/L (ref 0–2)
BASE DEFICIT BLDA-SCNC: 1 MMOL/L (ref 0–2)
BASE DEFICIT BLDA-SCNC: 2 MMOL/L (ref 0–2)
BASE DEFICIT BLDA-SCNC: 3 MMOL/L (ref 0–2)
BASE DEFICIT BLDA-SCNC: ABNORMAL MMOL/L
BASE DEFICIT BLDA-SCNC: ABNORMAL MMOL/L
BASE EXCESS BLDA CALC-SCNC: 1 MMOL/L (ref 0–3)
BASE EXCESS BLDA CALC-SCNC: 2 MMOL/L (ref 0–3)
BASE EXCESS BLDA CALC-SCNC: ABNORMAL MMOL/L
BASOPHILS # BLD: 0 K/MCL (ref 0–0.3)
BASOPHILS NFR BLD: 0 %
BDY SITE: ABNORMAL
BILIRUB SERPL-MCNC: 1.1 MG/DL (ref 0.2–1)
BILIRUB SERPL-MCNC: 1.2 MG/DL (ref 0.2–1)
BLOOD BANK CMNT PATIENT-IMP: NORMAL
BODY TEMPERATURE: 36.9 DEGREES
BODY TEMPERATURE: 36.9 DEGREES
BODY TEMPERATURE: 37 DEGREES
BODY TEMPERATURE: 37.1 DEGREES
BODY TEMPERATURE: 37.2 DEGREES
BUN SERPL-MCNC: 43 MG/DL (ref 6–20)
BUN SERPL-MCNC: 47 MG/DL (ref 6–20)
BUN/CREAT SERPL: 19 (ref 7–25)
BUN/CREAT SERPL: 19 (ref 7–25)
CA-I BLD ISE-SCNC: 1.12 MMOL/L (ref 1.15–1.29)
CA-I BLD ISE-SCNC: 1.13 MMOL/L (ref 1.15–1.29)
CA-I BLD ISE-SCNC: 1.14 MMOL/L (ref 1.15–1.29)
CA-I BLD ISE-SCNC: 1.15 MMOL/L (ref 1.15–1.29)
CA-I BLDA-SCNC: 13 % (ref 15–23)
CA-I BLDA-SCNC: 13 % (ref 15–23)
CA-I BLDA-SCNC: 14 % (ref 15–23)
CA-I BLDA-SCNC: 16 ML/DL (ref 15–23)
CALCIUM SERPL-MCNC: 8.1 MG/DL (ref 8.4–10.2)
CALCIUM SERPL-MCNC: 8.2 MG/DL (ref 8.4–10.2)
CHLORIDE SERPL-SCNC: 101 MMOL/L (ref 98–107)
CHLORIDE SERPL-SCNC: 104 MMOL/L (ref 98–107)
CK SERPL-CCNC: 86 UNITS/L (ref 26–192)
CO2 SERPL-SCNC: 23 MMOL/L (ref 21–32)
CO2 SERPL-SCNC: 24 MMOL/L (ref 21–32)
COHGB MFR BLD: 0.8 %
COHGB MFR BLD: 1.1 %
COHGB MFR BLD: 1.3 %
COHGB MFR BLD: 1.3 %
COHGB MFR BLD: 1.4 %
COHGB MFR BLD: 1.7 %
CONDITION, POC: ABNORMAL
CONDITION-RC: ABNORMAL
CONDITION: ABNORMAL
CREAT SERPL-MCNC: 2.27 MG/DL (ref 0.51–0.95)
CREAT SERPL-MCNC: 2.47 MG/DL (ref 0.51–0.95)
CRP SERPL-MCNC: 19 MG/DL
D DIMER PPP FEU-MCNC: >35.2 MG/L (FEU)
D DIMER PPP FEU-MCNC: ABNORMAL
DIFFERENTIAL METHOD BLD: ABNORMAL
EOSINOPHIL # BLD: 0.2 K/MCL (ref 0.1–0.5)
EOSINOPHIL NFR BLD: 1 %
ERYTHROCYTE [DISTWIDTH] IN BLOOD: 14.3 % (ref 11–15)
FERRITIN SERPL-MCNC: 714 NG/ML (ref 8–252)
FIBRINOGEN PPP-MCNC: 511 MG/DL (ref 190–425)
GLOBULIN SER-MCNC: 3.5 G/DL (ref 2–4)
GLOBULIN SER-MCNC: 3.7 G/DL (ref 2–4)
GLUCOSE BLD-MCNC: 139 MG/DL (ref 70–99)
GLUCOSE SERPL-MCNC: 112 MG/DL (ref 65–99)
GLUCOSE SERPL-MCNC: 138 MG/DL (ref 65–99)
HCO3 BLDA-SCNC: 22 MMOL/L (ref 22–28)
HCO3 BLDA-SCNC: 22 MMOL/L (ref 22–28)
HCO3 BLDA-SCNC: 23 MMOL/L (ref 22–28)
HCO3 BLDA-SCNC: 24 MMOL/L (ref 22–28)
HCO3 BLDA-SCNC: 25 MMOL/L (ref 22–28)
HCO3 BLDA-SCNC: 26 MMOL/L (ref 22–28)
HCT VFR BLD CALC: 25.7 % (ref 36–46.5)
HCT VFR BLD CALC: 27.8 % (ref 36–46.5)
HCT VFR BLD CALC: 28.1 % (ref 36–46.5)
HCT VFR BLD CALC: ABNORMAL %
HGB BLD-MCNC: 10 G/DL (ref 12–15.5)
HGB BLD-MCNC: 10.5 G/DL (ref 12–15.5)
HGB BLD-MCNC: 10.5 G/DL (ref 12–15.5)
HGB BLD-MCNC: 8.7 G/DL (ref 12–15.5)
HGB BLD-MCNC: 9.4 G/DL (ref 12–15.5)
HGB BLD-MCNC: 9.4 G/DL (ref 12–15.5)
HGB BLD-MCNC: 9.5 G/DL (ref 12–15.5)
HGB BLD-MCNC: 9.7 G/DL (ref 12–15.5)
HGB BLD-MCNC: 9.9 G/DL (ref 12–15.5)
HOROWITZ INDEX BLD+IHG-RTO: ABNORMAL MM[HG]
INR PPP: 1
INR PPP: NORMAL
LACTATE BLDA-MCNC: 0.5 MMOL/L
LACTATE BLDA-MCNC: 0.6 MMOL/L
LACTATE BLDA-MCNC: 0.6 MMOL/L
LDH SERPL L TO P-CCNC: 591 UNITS/L (ref 82–240)
LYMPHOCYTES # BLD: 1.2 K/MCL (ref 1–4.8)
LYMPHOCYTES NFR BLD: 5 %
MAGNESIUM SERPL-MCNC: 2.7 MG/DL (ref 1.7–2.4)
MAGNESIUM SERPL-MCNC: 2.8 MG/DL (ref 1.7–2.4)
MCH RBC QN AUTO: 29.6 PG (ref 26–34)
MCHC RBC AUTO-ENTMCNC: 33.9 G/DL (ref 32–36.5)
MCV RBC AUTO: 87.4 FL (ref 78–100)
METHGB MFR BLD: 2.1 %
METHGB MFR BLD: 2.2 %
METHGB MFR BLD: 2.3 %
METHGB MFR BLD: 2.4 %
MICROCYTOSIS (MICY): ABNORMAL
MONOCYTES # BLD: 0.5 K/MCL (ref 0.3–0.9)
MONOCYTES NFR BLD: 2 %
MYELOCYTES NFR BLD: 1 %
NEUTROPHILS # BLD: 21.2 K/MCL (ref 1.8–7.7)
NEUTS BAND NFR BLD: 1 % (ref 0–10)
NEUTS SEG NFR BLD: 90 %
NRBC (NRBCRE): 0 /100 WBC
NT-PROBNP SERPL-MCNC: ABNORMAL PG/ML
NUM BPU REQUESTED: 1
OXYHGB MFR BLD: 95.6 % (ref 94–98)
OXYHGB MFR BLD: 95.7 % (ref 94–98)
OXYHGB MFR BLD: 95.8 % (ref 94–98)
OXYHGB MFR BLD: 95.9 % (ref 94–98)
OXYHGB MFR BLD: 96.2 % (ref 94–98)
OXYHGB MFR BLDA: 96.2 % (ref 94–98)
PAO2 / FIO2 RATIO (RPFR): ABNORMAL
PATH REV BLD -IMP: ABNORMAL
PCO2 BLDA: 34 MM HG (ref 32–45)
PCO2 BLDA: 35 MM HG (ref 32–45)
PCO2 BLDA: 36 MM HG (ref 32–45)
PCO2 BLDA: 37 MM HG (ref 32–45)
PH BLDA: 7.41 UNITS (ref 7.35–7.45)
PH BLDA: 7.41 UNITS (ref 7.35–7.45)
PH BLDA: 7.42 UNITS (ref 7.35–7.45)
PH BLDA: 7.43 UNITS (ref 7.35–7.45)
PH BLDA: 7.45 UNITS (ref 7.35–7.45)
PH BLDA: 7.45 UNITS (ref 7.35–7.45)
PHOSPHATE SERPL-MCNC: 3.3 MG/DL (ref 2.4–4.7)
PHOSPHATE SERPL-MCNC: 4 MG/DL (ref 2.4–4.7)
PLAT MORPH BLD: NORMAL
PLATELET # BLD: 190 K/MCL (ref 140–450)
PO2 BLDA: 108 MM HG (ref 83–108)
PO2 BLDA: 110 MM HG (ref 83–108)
PO2 BLDA: 111 MM HG (ref 83–108)
PO2 BLDA: 134 MM HG (ref 83–108)
PO2 BLDA: 152 MM HG (ref 83–108)
PO2 BLDA: 489 MM HG (ref 83–108)
POTASSIUM BLD-SCNC: 3.6 MMOL/L (ref 3.4–5.1)
POTASSIUM BLD-SCNC: 3.8 MMOL/L (ref 3.4–5.1)
POTASSIUM BLD-SCNC: 4.2 MMOL/L (ref 3.4–5.1)
POTASSIUM BLD-SCNC: 4.4 MMOL/L (ref 3.4–5.1)
POTASSIUM BLD-SCNC: 4.6 MMOL/L (ref 3.4–5.1)
POTASSIUM BLD-SCNC: 4.7 MMOL/L (ref 3.4–5.1)
POTASSIUM SERPL-SCNC: 3.8 MMOL/L (ref 3.4–5.1)
POTASSIUM SERPL-SCNC: 4.4 MMOL/L (ref 3.4–5.1)
PROCALCITONIN SERPL IA-MCNC: 5.69 NG/ML
PROCALCITONIN SERPL IA-MCNC: ABNORMAL NG/ML
PROT SERPL-MCNC: 6.3 G/DL (ref 6.4–8.2)
PROT SERPL-MCNC: 6.5 G/DL (ref 6.4–8.2)
PROTHROMBIN TIME (PRT2): NORMAL
PROTHROMBIN TIME: 10.4 SEC (ref 9.7–11.8)
RBC # BLD: 2.94 MIL/MCL (ref 4–5.2)
SAO2 % BLDA: 100 %
SAO2 % BLDA: 100 % (ref 95–99)
SAO2 % BLDA: 100 % (ref 95–99)
SAO2 % BLDA: 99 % (ref 95–99)
SERVICE CMNT-IMP: ABNORMAL
SODIUM BLD-SCNC: 131 MMOL/L (ref 135–145)
SODIUM BLD-SCNC: 132 MMOL/L (ref 135–145)
SODIUM BLD-SCNC: 133 MMOL/L (ref 135–145)
SODIUM BLD-SCNC: 134 MMOL/L (ref 135–145)
SODIUM SERPL-SCNC: 133 MMOL/L (ref 135–145)
SODIUM SERPL-SCNC: 135 MMOL/L (ref 135–145)
TRIGL SERPL-MCNC: 231 MG/DL
TRIGL SERPL-MCNC: ABNORMAL MG/DL
WBC # BLD: 23.3 K/MCL (ref 4.2–11)
WBC MORPH BLD: NORMAL

## 2020-07-02 PROCEDURE — 99233 SBSQ HOSP IP/OBS HIGH 50: CPT | Performed by: INTERNAL MEDICINE

## 2020-07-03 LAB
ABO + RH BLD: NORMAL
ALBUMIN SERPL-MCNC: 3.1 G/DL (ref 3.6–5.1)
ALBUMIN SERPL-MCNC: 3.2 G/DL (ref 3.6–5.1)
ALBUMIN/GLOB SERPL: 0.8 {RATIO} (ref 1–2.4)
ALBUMIN/GLOB SERPL: 0.9 {RATIO} (ref 1–2.4)
ALP SERPL-CCNC: 73 UNITS/L (ref 45–117)
ALP SERPL-CCNC: 84 UNITS/L (ref 45–117)
ALT SERPL-CCNC: 20 UNITS/L
ALT SERPL-CCNC: 22 UNITS/L
ANALYZER ANC (IANC): ABNORMAL
ANION GAP SERPL CALC-SCNC: 11 MMOL/L (ref 10–20)
ANION GAP SERPL CALC-SCNC: 12 MMOL/L (ref 10–20)
APTT PPP: 29 SEC (ref 22–32)
APTT PPP: NORMAL S
APTT PPP: NORMAL S
AST SERPL-CCNC: 34 UNITS/L
AST SERPL-CCNC: 38 UNITS/L
BACTERIA BLD CULT: NORMAL
BASE DEFICIT BLDA-SCNC: 0 MMOL/L (ref 0–2)
BASE DEFICIT BLDA-SCNC: 1 MMOL/L (ref 0–2)
BASE DEFICIT BLDA-SCNC: 2 MMOL/L (ref 0–2)
BASE DEFICIT BLDA-SCNC: 3 MMOL/L (ref 0–2)
BASE DEFICIT BLDA-SCNC: 3 MMOL/L (ref 0–2)
BASE DEFICIT BLDA-SCNC: ABNORMAL MMOL/L
BASE EXCESS BLDA CALC-SCNC: 0 MMOL/L (ref 0–3)
BASE EXCESS BLDA CALC-SCNC: ABNORMAL MMOL/L
BASOPHILS # BLD: 0 K/MCL (ref 0–0.3)
BASOPHILS NFR BLD: 0 %
BDY SITE: ABNORMAL
BILIRUB SERPL-MCNC: 1.4 MG/DL (ref 0.2–1)
BILIRUB SERPL-MCNC: 1.7 MG/DL (ref 0.2–1)
BLD GP AB SCN SERPL QL: NEGATIVE
BLD PROD TYP BPU: NORMAL
BLD UNIT ID BPU: NORMAL
BODY TEMPERATURE: 37 DEGREES
BODY TEMPERATURE: 37 DEGREES
BODY TEMPERATURE: 37.2 DEGREES
BODY TEMPERATURE: 37.5 DEGREES
BODY TEMPERATURE: 37.6 DEGREES
BUN SERPL-MCNC: 42 MG/DL (ref 6–20)
BUN SERPL-MCNC: 44 MG/DL (ref 6–20)
BUN/CREAT SERPL: 18 (ref 7–25)
BUN/CREAT SERPL: 21 (ref 7–25)
BURR CELLS (BURC): ABNORMAL
CA-I BLD ISE-SCNC: 1.14 MMOL/L (ref 1.15–1.29)
CA-I BLD ISE-SCNC: 1.15 MMOL/L (ref 1.15–1.29)
CA-I BLD ISE-SCNC: 1.16 MMOL/L (ref 1.15–1.29)
CA-I BLD ISE-SCNC: 1.17 MMOL/L (ref 1.15–1.29)
CA-I BLD ISE-SCNC: 1.18 MMOL/L (ref 1.15–1.29)
CA-I BLD ISE-SCNC: 1.2 MMOL/L (ref 1.15–1.29)
CA-I BLDA-SCNC: 11 % (ref 15–23)
CA-I BLDA-SCNC: 12 % (ref 15–23)
CA-I BLDA-SCNC: 13 % (ref 15–23)
CA-I BLDA-SCNC: 13 % (ref 15–23)
CA-I BLDA-SCNC: 14 ML/DL (ref 15–23)
CA-I BLDA-SCNC: 15 % (ref 15–23)
CALCIUM SERPL-MCNC: 8.8 MG/DL (ref 8.4–10.2)
CALCIUM SERPL-MCNC: 9.2 MG/DL (ref 8.4–10.2)
CHLORIDE SERPL-SCNC: 103 MMOL/L (ref 98–107)
CHLORIDE SERPL-SCNC: 104 MMOL/L (ref 98–107)
CK SERPL-CCNC: 80 UNITS/L (ref 26–192)
CO2 SERPL-SCNC: 24 MMOL/L (ref 21–32)
CO2 SERPL-SCNC: 24 MMOL/L (ref 21–32)
COHGB MFR BLD: 1 %
COHGB MFR BLD: 1.1 %
COHGB MFR BLD: 1.2 %
COHGB MFR BLD: 1.5 %
COHGB MFR BLD: 1.7 %
COHGB MFR BLD: 2 %
CONDITION, POC: ABNORMAL
CONDITION-RC: ABNORMAL
CONDITION: ABNORMAL
CREAT SERPL-MCNC: 2.13 MG/DL (ref 0.51–0.95)
CREAT SERPL-MCNC: 2.35 MG/DL (ref 0.51–0.95)
CROSSMATCH EXPIRE: NORMAL
CRP SERPL-MCNC: 16 MG/DL
D DIMER PPP FEU-MCNC: >35.2 MG/L (FEU)
D DIMER PPP FEU-MCNC: ABNORMAL
DIFFERENTIAL METHOD BLD: ABNORMAL
EOSINOPHIL # BLD: 0 K/MCL (ref 0.1–0.5)
EOSINOPHIL NFR BLD: 0 %
ERYTHROCYTE [DISTWIDTH] IN BLOOD: 14.2 % (ref 11–15)
FERRITIN SERPL-MCNC: 874 NG/ML (ref 8–252)
FIBRINOGEN PPP-MCNC: 518 MG/DL (ref 190–425)
GLOBULIN SER-MCNC: 3.7 G/DL (ref 2–4)
GLOBULIN SER-MCNC: 3.8 G/DL (ref 2–4)
GLUCOSE BLD-MCNC: 127 MG/DL (ref 70–99)
GLUCOSE BLDC GLUCOMTR-MCNC: 101 MG/DL (ref 70–99)
GLUCOSE BLDC GLUCOMTR-MCNC: 106 MG/DL (ref 70–99)
GLUCOSE BLDC GLUCOMTR-MCNC: 116 MG/DL (ref 70–99)
GLUCOSE BLDC GLUCOMTR-MCNC: 116 MG/DL (ref 70–99)
GLUCOSE BLDC GLUCOMTR-MCNC: 118 MG/DL (ref 70–99)
GLUCOSE BLDC GLUCOMTR-MCNC: 118 MG/DL (ref 70–99)
GLUCOSE BLDC GLUCOMTR-MCNC: 119 MG/DL (ref 70–99)
GLUCOSE BLDC GLUCOMTR-MCNC: 120 MG/DL (ref 70–99)
GLUCOSE BLDC GLUCOMTR-MCNC: 123 MG/DL (ref 70–99)
GLUCOSE BLDC GLUCOMTR-MCNC: 124 MG/DL (ref 70–99)
GLUCOSE BLDC GLUCOMTR-MCNC: 125 MG/DL (ref 70–99)
GLUCOSE BLDC GLUCOMTR-MCNC: 134 MG/DL (ref 70–99)
GLUCOSE BLDC GLUCOMTR-MCNC: ABNORMAL MG/DL
GLUCOSE SERPL-MCNC: 108 MG/DL (ref 65–99)
GLUCOSE SERPL-MCNC: 126 MG/DL (ref 65–99)
HCO3 BLDA-SCNC: 22 MMOL/L (ref 22–28)
HCO3 BLDA-SCNC: 22 MMOL/L (ref 22–28)
HCO3 BLDA-SCNC: 23 MMOL/L (ref 22–28)
HCO3 BLDA-SCNC: 23 MMOL/L (ref 22–28)
HCO3 BLDA-SCNC: 24 MMOL/L (ref 22–28)
HCO3 BLDA-SCNC: 25 MMOL/L (ref 22–28)
HCT VFR BLD CALC: 26.1 % (ref 36–46.5)
HCT VFR BLD CALC: 27.3 % (ref 36–46.5)
HCT VFR BLD CALC: ABNORMAL %
HGB BLD-MCNC: 10.6 G/DL (ref 12–15.5)
HGB BLD-MCNC: 8.7 G/DL (ref 12–15.5)
HGB BLD-MCNC: 8.8 G/DL (ref 12–15.5)
HGB BLD-MCNC: 9.1 G/DL (ref 12–15.5)
HGB BLD-MCNC: 9.5 G/DL (ref 12–15.5)
HGB BLD-MCNC: 9.6 G/DL (ref 12–15.5)
HOROWITZ INDEX BLD+IHG-RTO: ABNORMAL MM[HG]
INR PPP: 1
INR PPP: NORMAL
LACTATE BLDA-MCNC: 0.6 MMOL/L
LACTATE BLDA-MCNC: 0.8 MMOL/L
LACTATE BLDA-MCNC: 0.9 MMOL/L
LACTATE BLDA-MCNC: 1 MMOL/L
LARGE PLATELETS (PLTL): PRESENT
LDH SERPL L TO P-CCNC: 632 UNITS/L (ref 82–240)
LYMPHOCYTES # BLD: 0.5 K/MCL (ref 1–4.8)
LYMPHOCYTES NFR BLD: 2 %
MAGNESIUM SERPL-MCNC: 2.7 MG/DL (ref 1.7–2.4)
MAGNESIUM SERPL-MCNC: 2.8 MG/DL (ref 1.7–2.4)
MAJ XM SERPL-IMP: NORMAL
MCH RBC QN AUTO: 29.1 PG (ref 26–34)
MCHC RBC AUTO-ENTMCNC: 33.3 G/DL (ref 32–36.5)
MCV RBC AUTO: 87.2 FL (ref 78–100)
METHGB MFR BLD: 0.8 %
METHGB MFR BLD: 1.3 %
METHGB MFR BLD: 1.3 %
METHGB MFR BLD: 2.6 %
METHGB MFR BLD: 2.6 %
METHGB MFR BLD: 2.7 %
MICROCYTOSIS (MICY): ABNORMAL
MONOCYTES # BLD: 1 K/MCL (ref 0.3–0.9)
MONOCYTES NFR BLD: 4 %
MYELOCYTES NFR BLD: 5 %
NEUTROPHILS # BLD: 21.5 K/MCL (ref 1.8–7.7)
NEUTS SEG NFR BLD: 89 %
NRBC (NRBCRE): 0 /100 WBC
NT-PROBNP SERPL-MCNC: ABNORMAL PG/ML
NUM BPU REQUESTED: 5
OXYHGB MFR BLD: 91.9 % (ref 94–98)
OXYHGB MFR BLD: 94.4 % (ref 94–98)
OXYHGB MFR BLD: 95.4 % (ref 94–98)
OXYHGB MFR BLD: 95.6 % (ref 94–98)
OXYHGB MFR BLD: 96.1 % (ref 94–98)
OXYHGB MFR BLDA: 97.2 % (ref 94–98)
PAO2 / FIO2 RATIO (RPFR): ABNORMAL
PATH REV BLD -IMP: ABNORMAL
PCO2 BLDA: 35 MM HG (ref 32–45)
PCO2 BLDA: 36 MM HG (ref 32–45)
PCO2 BLDA: 36 MM HG (ref 32–45)
PCO2 BLDA: 39 MM HG (ref 32–45)
PCO2 BLDA: 39 MM HG (ref 32–45)
PCO2 BLDA: 40 MM HG (ref 32–45)
PH BLDA: 7.36 UNITS (ref 7.35–7.45)
PH BLDA: 7.36 UNITS (ref 7.35–7.45)
PH BLDA: 7.41 UNITS (ref 7.35–7.45)
PH BLDA: 7.41 UNITS (ref 7.35–7.45)
PH BLDA: 7.42 UNITS (ref 7.35–7.45)
PH BLDA: 7.43 UNITS (ref 7.35–7.45)
PHOSPHATE SERPL-MCNC: 3.5 MG/DL (ref 2.4–4.7)
PHOSPHATE SERPL-MCNC: 3.5 MG/DL (ref 2.4–4.7)
PLATELET # BLD: 211 K/MCL (ref 140–450)
PO2 BLDA: 135 MM HG (ref 83–108)
PO2 BLDA: 158 MM HG (ref 83–108)
PO2 BLDA: 390 MM HG (ref 83–108)
PO2 BLDA: 59 MM HG (ref 83–108)
PO2 BLDA: 80 MM HG (ref 83–108)
PO2 BLDA: 86 MM HG (ref 83–108)
POLYCHROMASIA (POLY): ABNORMAL
POTASSIUM BLD-SCNC: 4.4 MMOL/L (ref 3.4–5.1)
POTASSIUM BLD-SCNC: 4.5 MMOL/L (ref 3.4–5.1)
POTASSIUM BLD-SCNC: 4.8 MMOL/L (ref 3.4–5.1)
POTASSIUM BLD-SCNC: 4.9 MMOL/L (ref 3.4–5.1)
POTASSIUM BLD-SCNC: 5.2 MMOL/L (ref 3.4–5.1)
POTASSIUM BLD-SCNC: 5.5 MMOL/L (ref 3.4–5.1)
POTASSIUM SERPL-SCNC: 4.5 MMOL/L (ref 3.4–5.1)
POTASSIUM SERPL-SCNC: 5.1 MMOL/L (ref 3.4–5.1)
PROCALCITONIN SERPL IA-MCNC: 6.05 NG/ML
PROCALCITONIN SERPL IA-MCNC: ABNORMAL NG/ML
PROT SERPL-MCNC: 6.9 G/DL (ref 6.4–8.2)
PROT SERPL-MCNC: 6.9 G/DL (ref 6.4–8.2)
PROTHROMBIN TIME (PRT2): NORMAL
PROTHROMBIN TIME: 10.1 SEC (ref 9.7–11.8)
RBC # BLD: 3.13 MIL/MCL (ref 4–5.2)
SAO2 % BLDA: 100 %
SAO2 % BLDA: 100 % (ref 95–99)
SAO2 % BLDA: 95 % (ref 95–99)
SAO2 % BLDA: 98 % (ref 95–99)
SAO2 % BLDA: 99 % (ref 95–99)
SAO2 % BLDA: 99 % (ref 95–99)
SERVICE CMNT-IMP: ABNORMAL
SODIUM BLD-SCNC: 130 MMOL/L (ref 135–145)
SODIUM BLD-SCNC: 131 MMOL/L (ref 135–145)
SODIUM BLD-SCNC: 131 MMOL/L (ref 135–145)
SODIUM BLD-SCNC: 132 MMOL/L (ref 135–145)
SODIUM BLD-SCNC: 132 MMOL/L (ref 135–145)
SODIUM BLD-SCNC: 133 MMOL/L (ref 135–145)
SODIUM SERPL-SCNC: 134 MMOL/L (ref 135–145)
SODIUM SERPL-SCNC: 134 MMOL/L (ref 135–145)
STATUS OF UNIT: NORMAL
TRANSFUSION STATUS: NORMAL
UNIT DIVISION: 0
WBC # BLD: 24.2 K/MCL (ref 4.2–11)
WBC MORPH BLD: NORMAL

## 2020-07-03 PROCEDURE — 99233 SBSQ HOSP IP/OBS HIGH 50: CPT | Performed by: INTERNAL MEDICINE

## 2020-07-04 LAB
ALBUMIN SERPL-MCNC: 3.3 G/DL (ref 3.6–5.1)
ALBUMIN/GLOB SERPL: 0.9 {RATIO} (ref 1–2.4)
ALP SERPL-CCNC: 85 UNITS/L (ref 45–117)
ALT SERPL-CCNC: 22 UNITS/L
ANALYZER ANC (IANC): ABNORMAL
ANION GAP SERPL CALC-SCNC: 11 MMOL/L (ref 10–20)
ANION GAP SERPL CALC-SCNC: 12 MMOL/L (ref 10–20)
APTT PPP: 31 SEC (ref 22–32)
APTT PPP: NORMAL S
APTT PPP: NORMAL S
AST SERPL-CCNC: 32 UNITS/L
BASE DEFICIT BLDA-SCNC: 0 MMOL/L (ref 0–2)
BASE DEFICIT BLDA-SCNC: 0 MMOL/L (ref 0–2)
BASE DEFICIT BLDA-SCNC: 1 MMOL/L (ref 0–2)
BASE DEFICIT BLDA-SCNC: 1 MMOL/L (ref 0–2)
BASE DEFICIT BLDA-SCNC: 4 MMOL/L (ref 0–2)
BASE DEFICIT BLDA-SCNC: ABNORMAL MMOL/L
BASE DEFICIT BLDA-SCNC: ABNORMAL MMOL/L
BASE EXCESS BLDA CALC-SCNC: 0 MMOL/L (ref 0–3)
BASE EXCESS BLDA CALC-SCNC: 0 MMOL/L (ref 0–3)
BASE EXCESS BLDA CALC-SCNC: ABNORMAL MMOL/L
BASOPHILS # BLD: 0 K/MCL (ref 0–0.3)
BASOPHILS NFR BLD: 0 %
BDY SITE: ABNORMAL
BILIRUB SERPL-MCNC: 1.6 MG/DL (ref 0.2–1)
BLOOD BANK CMNT PATIENT-IMP: NORMAL
BODY TEMPERATURE: 37 DEGREES
BODY TEMPERATURE: 37.2 DEGREES
BODY TEMPERATURE: 37.2 DEGREES
BODY TEMPERATURE: 37.4 DEGREES
BUN SERPL-MCNC: 40 MG/DL (ref 6–20)
BUN SERPL-MCNC: 42 MG/DL (ref 6–20)
BUN/CREAT SERPL: 21 (ref 7–25)
BUN/CREAT SERPL: 22 (ref 7–25)
BURR CELLS (BURC): ABNORMAL
CA-I BLD ISE-SCNC: 1.15 MMOL/L (ref 1.15–1.29)
CA-I BLD ISE-SCNC: 1.17 MMOL/L (ref 1.15–1.29)
CA-I BLD ISE-SCNC: 1.19 MMOL/L (ref 1.15–1.29)
CA-I BLD ISE-SCNC: 1.19 MMOL/L (ref 1.15–1.29)
CA-I BLDA-SCNC: 12 % (ref 15–23)
CA-I BLDA-SCNC: 13 % (ref 15–23)
CA-I BLDA-SCNC: 15 ML/DL (ref 15–23)
CALCIUM SERPL-MCNC: 8.8 MG/DL (ref 8.4–10.2)
CALCIUM SERPL-MCNC: 9 MG/DL (ref 8.4–10.2)
CHLORIDE SERPL-SCNC: 103 MMOL/L (ref 98–107)
CHLORIDE SERPL-SCNC: 104 MMOL/L (ref 98–107)
CK SERPL-CCNC: 74 UNITS/L (ref 26–192)
CO2 SERPL-SCNC: 24 MMOL/L (ref 21–32)
CO2 SERPL-SCNC: 24 MMOL/L (ref 21–32)
COHGB MFR BLD: 1.3 %
COHGB MFR BLD: 1.4 %
COHGB MFR BLD: 1.5 %
COHGB MFR BLD: 1.6 %
COHGB MFR BLD: 1.7 %
CONDITION, POC: ABNORMAL
CONDITION-RC: ABNORMAL
CONDITION: ABNORMAL
CREAT SERPL-MCNC: 1.79 MG/DL (ref 0.51–0.95)
CREAT SERPL-MCNC: 2.03 MG/DL (ref 0.51–0.95)
CRP SERPL-MCNC: 19 MG/DL
D DIMER PPP FEU-MCNC: >35.2 MG/L (FEU)
D DIMER PPP FEU-MCNC: ABNORMAL
DIFFERENTIAL METHOD BLD: ABNORMAL
EOSINOPHIL # BLD: 0 K/MCL (ref 0.1–0.5)
EOSINOPHIL NFR BLD: 0 %
ERYTHROCYTE [DISTWIDTH] IN BLOOD: 14.2 % (ref 11–15)
FERRITIN SERPL-MCNC: 1075 NG/ML (ref 8–252)
FIBRINOGEN PPP-MCNC: 538 MG/DL (ref 190–425)
GLOBULIN SER-MCNC: 3.8 G/DL (ref 2–4)
GLUCOSE BLDC GLUCOMTR-MCNC: 109 MG/DL (ref 70–99)
GLUCOSE BLDC GLUCOMTR-MCNC: 118 MG/DL (ref 70–99)
GLUCOSE BLDC GLUCOMTR-MCNC: 121 MG/DL (ref 70–99)
GLUCOSE BLDC GLUCOMTR-MCNC: 128 MG/DL (ref 70–99)
GLUCOSE BLDC GLUCOMTR-MCNC: 145 MG/DL (ref 70–99)
GLUCOSE BLDC GLUCOMTR-MCNC: 149 MG/DL (ref 70–99)
GLUCOSE BLDC GLUCOMTR-MCNC: ABNORMAL MG/DL
GLUCOSE SERPL-MCNC: 119 MG/DL (ref 65–99)
GLUCOSE SERPL-MCNC: 148 MG/DL (ref 65–99)
HCO3 BLDA-SCNC: 22 MMOL/L (ref 22–28)
HCO3 BLDA-SCNC: 24 MMOL/L (ref 22–28)
HCO3 BLDA-SCNC: 25 MMOL/L (ref 22–28)
HCO3 BLDA-SCNC: 25 MMOL/L (ref 22–28)
HCT VFR BLD CALC: 25 % (ref 36–46.5)
HCT VFR BLD CALC: 26.4 % (ref 36–46.5)
HCT VFR BLD CALC: ABNORMAL %
HGB BLD-MCNC: 10.1 G/DL (ref 12–15.5)
HGB BLD-MCNC: 8.4 G/DL (ref 12–15.5)
HGB BLD-MCNC: 8.7 G/DL (ref 12–15.5)
HGB BLD-MCNC: 8.8 G/DL (ref 12–15.5)
HGB BLD-MCNC: 9 G/DL (ref 12–15.5)
HGB BLD-MCNC: 9.1 G/DL (ref 12–15.5)
HGB BLD-MCNC: 9.2 G/DL (ref 12–15.5)
HGB BLD-MCNC: 9.4 G/DL (ref 12–15.5)
HGB BLD-MCNC: 9.6 G/DL (ref 12–15.5)
HOROWITZ INDEX BLD+IHG-RTO: ABNORMAL MM[HG]
INR PPP: 1
INR PPP: NORMAL
LACTATE BLDA-MCNC: 0.5 MMOL/L
LACTATE BLDA-MCNC: 0.5 MMOL/L
LACTATE BLDA-MCNC: 0.6 MMOL/L
LACTATE BLDA-MCNC: 0.7 MMOL/L
LACTATE BLDA-MCNC: 0.8 MMOL/L
LACTATE BLDA-MCNC: 0.8 MMOL/L
LDH SERPL L TO P-CCNC: 601 UNITS/L (ref 82–240)
LYMPHOCYTES # BLD: 0.3 K/MCL (ref 1–4.8)
LYMPHOCYTES NFR BLD: 1 %
MAGNESIUM SERPL-MCNC: 2.7 MG/DL (ref 1.7–2.4)
MAGNESIUM SERPL-MCNC: 2.8 MG/DL (ref 1.7–2.4)
MCH RBC QN AUTO: 29.8 PG (ref 26–34)
MCHC RBC AUTO-ENTMCNC: 33.6 G/DL (ref 32–36.5)
MCV RBC AUTO: 88.7 FL (ref 78–100)
METHGB MFR BLD: 0.7 %
METHGB MFR BLD: 0.8 %
METHGB MFR BLD: 1 %
METHGB MFR BLD: 1.2 %
METHGB MFR BLD: 1.4 %
MONOCYTES # BLD: 0.5 K/MCL (ref 0.3–0.9)
MONOCYTES NFR BLD: 2 %
MYELOCYTES NFR BLD: 3 %
NEUTROPHILS # BLD: 24.3 K/MCL (ref 1.8–7.7)
NEUTS SEG NFR BLD: 94 %
NRBC (NRBCRE): 0 /100 WBC
NT-PROBNP SERPL-MCNC: ABNORMAL PG/ML
NUM BPU REQUESTED: 1
OXYHGB MFR BLD: 94.6 % (ref 94–98)
OXYHGB MFR BLD: 95.5 % (ref 94–98)
OXYHGB MFR BLD: 95.7 % (ref 94–98)
OXYHGB MFR BLD: 96.1 % (ref 94–98)
OXYHGB MFR BLD: 97.1 % (ref 94–98)
OXYHGB MFR BLD: 97.3 % (ref 94–98)
OXYHGB MFR BLDA: 97.5 % (ref 94–98)
PAO2 / FIO2 RATIO (RPFR): ABNORMAL
PATH REV BLD -IMP: ABNORMAL
PCO2 BLDA: 36 MM HG (ref 32–45)
PCO2 BLDA: 38 MM HG (ref 32–45)
PCO2 BLDA: 39 MM HG (ref 32–45)
PCO2 BLDA: 39 MM HG (ref 32–45)
PCO2 BLDA: 42 MM HG (ref 32–45)
PH BLDA: 7.33 UNITS (ref 7.35–7.45)
PH BLDA: 7.4 UNITS (ref 7.35–7.45)
PH BLDA: 7.4 UNITS (ref 7.35–7.45)
PH BLDA: 7.41 UNITS (ref 7.35–7.45)
PH BLDA: 7.41 UNITS (ref 7.35–7.45)
PH BLDA: 7.42 UNITS (ref 7.35–7.45)
PH BLDA: 7.43 UNITS (ref 7.35–7.45)
PHOSPHATE SERPL-MCNC: 2.3 MG/DL (ref 2.4–4.7)
PHOSPHATE SERPL-MCNC: 3.4 MG/DL (ref 2.4–4.7)
PLAT MORPH BLD: NORMAL
PLATELET # BLD: 199 K/MCL (ref 140–450)
PO2 BLDA: 105 MM HG (ref 83–108)
PO2 BLDA: 396 MM HG (ref 83–108)
PO2 BLDA: 73 MM HG (ref 83–108)
PO2 BLDA: 73 MM HG (ref 83–108)
PO2 BLDA: 74 MM HG (ref 83–108)
PO2 BLDA: 92 MM HG (ref 83–108)
PO2 BLDA: 94 MM HG (ref 83–108)
POTASSIUM BLD-SCNC: 4.5 MMOL/L (ref 3.4–5.1)
POTASSIUM BLD-SCNC: 4.6 MMOL/L (ref 3.4–5.1)
POTASSIUM BLD-SCNC: 4.9 MMOL/L (ref 3.4–5.1)
POTASSIUM BLD-SCNC: 5 MMOL/L (ref 3.4–5.1)
POTASSIUM BLD-SCNC: 5.8 MMOL/L (ref 3.4–5.1)
POTASSIUM SERPL-SCNC: 4.3 MMOL/L (ref 3.4–5.1)
POTASSIUM SERPL-SCNC: 4.9 MMOL/L (ref 3.4–5.1)
PROCALCITONIN SERPL IA-MCNC: 6.71 NG/ML
PROCALCITONIN SERPL IA-MCNC: ABNORMAL NG/ML
PROT SERPL-MCNC: 7.1 G/DL (ref 6.4–8.2)
PROTHROMBIN TIME (PRT2): NORMAL
PROTHROMBIN TIME: 10.4 SEC (ref 9.7–11.8)
RBC # BLD: 2.82 MIL/MCL (ref 4–5.2)
SAO2 % BLDA: 100 %
SAO2 % BLDA: 100 % (ref 95–99)
SAO2 % BLDA: 98 % (ref 95–99)
SAO2 % BLDA: 99 % (ref 95–99)
SAO2 % BLDA: 99 % (ref 95–99)
SERVICE CMNT-IMP: ABNORMAL
SODIUM BLD-SCNC: 132 MMOL/L (ref 135–145)
SODIUM BLD-SCNC: 132 MMOL/L (ref 135–145)
SODIUM BLD-SCNC: 133 MMOL/L (ref 135–145)
SODIUM SERPL-SCNC: 134 MMOL/L (ref 135–145)
SODIUM SERPL-SCNC: 135 MMOL/L (ref 135–145)
WBC # BLD: 25.9 K/MCL (ref 4.2–11)
WBC MORPH BLD: NORMAL

## 2020-07-04 PROCEDURE — 99233 SBSQ HOSP IP/OBS HIGH 50: CPT | Performed by: INTERNAL MEDICINE

## 2020-07-05 LAB
ALBUMIN SERPL-MCNC: 3.1 G/DL (ref 3.6–5.1)
ALBUMIN SERPL-MCNC: 3.4 G/DL (ref 3.6–5.1)
ALBUMIN/GLOB SERPL: 0.7 {RATIO} (ref 1–2.4)
ALBUMIN/GLOB SERPL: 0.8 {RATIO} (ref 1–2.4)
ALP SERPL-CCNC: 100 UNITS/L (ref 45–117)
ALP SERPL-CCNC: 103 UNITS/L (ref 45–117)
ALT SERPL-CCNC: 19 UNITS/L
ALT SERPL-CCNC: 23 UNITS/L
ANALYZER ANC (IANC): ABNORMAL
ANION GAP SERPL CALC-SCNC: 10 MMOL/L (ref 10–20)
ANION GAP SERPL CALC-SCNC: 13 MMOL/L (ref 10–20)
APTT PPP: 30 SEC (ref 22–32)
APTT PPP: NORMAL S
APTT PPP: NORMAL S
AST SERPL-CCNC: 33 UNITS/L
AST SERPL-CCNC: 33 UNITS/L
BASE DEFICIT BLDA-SCNC: 1 MMOL/L (ref 0–2)
BASE DEFICIT BLDA-SCNC: 1 MMOL/L (ref 0–2)
BASE DEFICIT BLDA-SCNC: ABNORMAL MMOL/L
BASE EXCESS BLDA CALC-SCNC: 1 MMOL/L (ref 0–3)
BASE EXCESS BLDA CALC-SCNC: 1 MMOL/L (ref 0–3)
BASE EXCESS BLDA CALC-SCNC: 3 MMOL/L (ref 0–3)
BASE EXCESS BLDA CALC-SCNC: ABNORMAL MMOL/L
BASE EXCESS BLDA CALC-SCNC: ABNORMAL MMOL/L
BASOPHILS # BLD: 0 K/MCL (ref 0–0.3)
BASOPHILS NFR BLD: 0 %
BDY SITE: ABNORMAL
BILIRUB SERPL-MCNC: 1.3 MG/DL (ref 0.2–1)
BILIRUB SERPL-MCNC: 1.4 MG/DL (ref 0.2–1)
BLOOD BANK CMNT PATIENT-IMP: NORMAL
BODY TEMPERATURE: 37 DEGREES
BODY TEMPERATURE: 37 DEGREES
BODY TEMPERATURE: 37.1 DEGREES
BODY TEMPERATURE: 37.3 DEGREES
BUN SERPL-MCNC: 36 MG/DL (ref 6–20)
BUN SERPL-MCNC: 37 MG/DL (ref 6–20)
BUN/CREAT SERPL: 21 (ref 7–25)
BUN/CREAT SERPL: 23 (ref 7–25)
CA-I BLD ISE-SCNC: 1.17 MMOL/L (ref 1.15–1.29)
CA-I BLD ISE-SCNC: 1.17 MMOL/L (ref 1.15–1.29)
CA-I BLD ISE-SCNC: 1.19 MMOL/L (ref 1.15–1.29)
CA-I BLD ISE-SCNC: 1.19 MMOL/L (ref 1.15–1.29)
CA-I BLD ISE-SCNC: 1.2 MMOL/L (ref 1.15–1.29)
CA-I BLDA-SCNC: 12 % (ref 15–23)
CA-I BLDA-SCNC: 12 % (ref 15–23)
CA-I BLDA-SCNC: 13 % (ref 15–23)
CA-I BLDA-SCNC: 13 % (ref 15–23)
CA-I BLDA-SCNC: 19 ML/DL (ref 15–23)
CALCIUM SERPL-MCNC: 8.7 MG/DL (ref 8.4–10.2)
CALCIUM SERPL-MCNC: 8.9 MG/DL (ref 8.4–10.2)
CHLORIDE SERPL-SCNC: 101 MMOL/L (ref 98–107)
CHLORIDE SERPL-SCNC: 102 MMOL/L (ref 98–107)
CK SERPL-CCNC: 53 UNITS/L (ref 26–192)
CO2 SERPL-SCNC: 24 MMOL/L (ref 21–32)
CO2 SERPL-SCNC: 25 MMOL/L (ref 21–32)
COHGB MFR BLD: 1.3 %
COHGB MFR BLD: 1.5 %
COHGB MFR BLD: 1.6 %
COHGB MFR BLD: 2 %
COHGB MFR BLD: 2 %
CONDITION, POC: ABNORMAL
CONDITION-RC: ABNORMAL
CONDITION: ABNORMAL
CREAT SERPL-MCNC: 1.54 MG/DL (ref 0.51–0.95)
CREAT SERPL-MCNC: 1.74 MG/DL (ref 0.51–0.95)
CRP SERPL-MCNC: 22 MG/DL
D DIMER PPP FEU-MCNC: >35.2 MG/L (FEU)
D DIMER PPP FEU-MCNC: ABNORMAL
DIFFERENTIAL METHOD BLD: ABNORMAL
EOSINOPHIL # BLD: 0 K/MCL (ref 0.1–0.5)
EOSINOPHIL NFR BLD: 0 %
ERYTHROCYTE [DISTWIDTH] IN BLOOD: 14.2 % (ref 11–15)
FERRITIN SERPL-MCNC: 1279 NG/ML (ref 8–252)
FIBRINOGEN PPP-MCNC: 648 MG/DL (ref 190–425)
GLOBULIN SER-MCNC: 4.3 G/DL (ref 2–4)
GLOBULIN SER-MCNC: 4.3 G/DL (ref 2–4)
GLUCOSE BLD-MCNC: 135 MG/DL (ref 70–99)
GLUCOSE BLDC GLUCOMTR-MCNC: 115 MG/DL (ref 70–99)
GLUCOSE BLDC GLUCOMTR-MCNC: 128 MG/DL (ref 70–99)
GLUCOSE BLDC GLUCOMTR-MCNC: 134 MG/DL (ref 70–99)
GLUCOSE BLDC GLUCOMTR-MCNC: 137 MG/DL (ref 70–99)
GLUCOSE BLDC GLUCOMTR-MCNC: 138 MG/DL (ref 70–99)
GLUCOSE BLDC GLUCOMTR-MCNC: 139 MG/DL (ref 70–99)
GLUCOSE BLDC GLUCOMTR-MCNC: ABNORMAL MG/DL
GLUCOSE SERPL-MCNC: 133 MG/DL (ref 65–99)
GLUCOSE SERPL-MCNC: 142 MG/DL (ref 65–99)
HCO3 BLDA-SCNC: 23 MMOL/L (ref 22–28)
HCO3 BLDA-SCNC: 24 MMOL/L (ref 22–28)
HCO3 BLDA-SCNC: 24 MMOL/L (ref 22–28)
HCO3 BLDA-SCNC: 25 MMOL/L (ref 22–28)
HCO3 BLDA-SCNC: 26 MMOL/L (ref 22–28)
HCT VFR BLD CALC: 26 % (ref 36–46.5)
HCT VFR BLD CALC: 26.6 % (ref 36–46.5)
HCT VFR BLD CALC: 27.1 % (ref 36–46.5)
HCT VFR BLD CALC: ABNORMAL %
HGB BLD-MCNC: 13.2 G/DL (ref 12–15.5)
HGB BLD-MCNC: 8.6 G/DL (ref 12–15.5)
HGB BLD-MCNC: 8.9 G/DL (ref 12–15.5)
HGB BLD-MCNC: 9 G/DL (ref 12–15.5)
HGB BLD-MCNC: 9.1 G/DL (ref 12–15.5)
HGB BLD-MCNC: 9.2 G/DL (ref 12–15.5)
HGB BLD-MCNC: 9.3 G/DL (ref 12–15.5)
HGB BLD-MCNC: 9.5 G/DL (ref 12–15.5)
HOROWITZ INDEX BLD+IHG-RTO: ABNORMAL MM[HG]
HYPOCHROMIA (HYPOC): ABNORMAL
INR PPP: 1
INR PPP: NORMAL
LACTATE BLDA-MCNC: 0.5 MMOL/L
LACTATE BLDA-MCNC: 0.7 MMOL/L
LACTATE BLDA-MCNC: 0.8 MMOL/L
LACTATE BLDA-MCNC: 1 MMOL/L
LACTATE BLDA-MCNC: 2.5 MMOL/L
LDH SERPL L TO P-CCNC: 624 UNITS/L (ref 82–240)
LYMPHOCYTES # BLD: 2.3 K/MCL (ref 1–4.8)
LYMPHOCYTES NFR BLD: 8 %
MAGNESIUM SERPL-MCNC: 2.8 MG/DL (ref 1.7–2.4)
MAGNESIUM SERPL-MCNC: 2.9 MG/DL (ref 1.7–2.4)
MCH RBC QN AUTO: 29.5 PG (ref 26–34)
MCHC RBC AUTO-ENTMCNC: 33.5 G/DL (ref 32–36.5)
MCV RBC AUTO: 88.1 FL (ref 78–100)
METHGB MFR BLD: 0.6 %
METHGB MFR BLD: 0.7 %
METHGB MFR BLD: 0.9 %
METHGB MFR BLD: 1.1 %
METHGB MFR BLD: 1.2 %
MONOCYTES # BLD: 1.7 K/MCL (ref 0.3–0.9)
MONOCYTES NFR BLD: 6 %
MYELOCYTES NFR BLD: 1 %
NEUTROPHILS # BLD: 24.7 K/MCL (ref 1.8–7.7)
NEUTS SEG NFR BLD: 85 %
NRBC (NRBCRE): 0 /100 WBC
NT-PROBNP SERPL-MCNC: ABNORMAL PG/ML
NUM BPU REQUESTED: 1
OXYHGB MFR BLD: 93.5 % (ref 94–98)
OXYHGB MFR BLD: 94.5 % (ref 94–98)
OXYHGB MFR BLD: 95 % (ref 94–98)
OXYHGB MFR BLD: 95.9 % (ref 94–98)
OXYHGB MFR BLDA: 97.1 % (ref 94–98)
PAO2 / FIO2 RATIO (RPFR): ABNORMAL
PATH REV BLD -IMP: ABNORMAL
PCO2 BLDA: 35 MM HG (ref 32–45)
PCO2 BLDA: 36 MM HG (ref 32–45)
PCO2 BLDA: 40 MM HG (ref 32–45)
PH BLDA: 7.39 UNITS (ref 7.35–7.45)
PH BLDA: 7.42 UNITS (ref 7.35–7.45)
PH BLDA: 7.45 UNITS (ref 7.35–7.45)
PH BLDA: 7.45 UNITS (ref 7.35–7.45)
PH BLDA: 7.47 UNITS (ref 7.35–7.45)
PHOSPHATE SERPL-MCNC: 2.3 MG/DL (ref 2.4–4.7)
PHOSPHATE SERPL-MCNC: 2.6 MG/DL (ref 2.4–4.7)
PLAT MORPH BLD: NORMAL
PLATELET # BLD: 192 K/MCL (ref 140–450)
PO2 BLDA: 318 MM HG (ref 83–108)
PO2 BLDA: 63 MM HG (ref 83–108)
PO2 BLDA: 70 MM HG (ref 83–108)
PO2 BLDA: 71 MM HG (ref 83–108)
PO2 BLDA: 95 MM HG (ref 83–108)
POLYCHROMASIA (POLY): ABNORMAL
POTASSIUM BLD-SCNC: 4.6 MMOL/L (ref 3.4–5.1)
POTASSIUM BLD-SCNC: 4.7 MMOL/L (ref 3.4–5.1)
POTASSIUM BLD-SCNC: 5.3 MMOL/L (ref 3.4–5.1)
POTASSIUM BLD-SCNC: 5.3 MMOL/L (ref 3.4–5.1)
POTASSIUM BLD-SCNC: 5.4 MMOL/L (ref 3.4–5.1)
POTASSIUM SERPL-SCNC: 4.3 MMOL/L (ref 3.4–5.1)
POTASSIUM SERPL-SCNC: 5 MMOL/L (ref 3.4–5.1)
PROCALCITONIN SERPL IA-MCNC: 6.49 NG/ML
PROCALCITONIN SERPL IA-MCNC: ABNORMAL NG/ML
PROT SERPL-MCNC: 7.4 G/DL (ref 6.4–8.2)
PROT SERPL-MCNC: 7.7 G/DL (ref 6.4–8.2)
PROTHROMBIN TIME (PRT2): NORMAL
PROTHROMBIN TIME: 10.3 SEC (ref 9.7–11.8)
RBC # BLD: 3.02 MIL/MCL (ref 4–5.2)
SAO2 % BLDA: 100 %
SAO2 % BLDA: 96 % (ref 95–99)
SAO2 % BLDA: 97 % (ref 95–99)
SAO2 % BLDA: 97 % (ref 95–99)
SAO2 % BLDA: 98 % (ref 95–99)
SERVICE CMNT-IMP: ABNORMAL
SODIUM BLD-SCNC: 132 MMOL/L (ref 135–145)
SODIUM BLD-SCNC: 133 MMOL/L (ref 135–145)
SODIUM BLD-SCNC: 134 MMOL/L (ref 135–145)
SODIUM BLD-SCNC: 134 MMOL/L (ref 135–145)
SODIUM BLD-SCNC: 135 MMOL/L (ref 135–145)
SODIUM SERPL-SCNC: 133 MMOL/L (ref 135–145)
SODIUM SERPL-SCNC: 133 MMOL/L (ref 135–145)
WBC # BLD: 29.1 K/MCL (ref 4.2–11)

## 2020-07-05 PROCEDURE — 99233 SBSQ HOSP IP/OBS HIGH 50: CPT | Performed by: INTERNAL MEDICINE

## 2020-07-06 LAB
ABO + RH BLD: NORMAL
ALBUMIN SERPL-MCNC: 3.6 G/DL (ref 3.6–5.1)
ALBUMIN/GLOB SERPL: 0.9 {RATIO} (ref 1–2.4)
ALP SERPL-CCNC: 108 UNITS/L (ref 45–117)
ALT SERPL-CCNC: 23 UNITS/L
ANALYZER ANC (IANC): ABNORMAL
ANION GAP SERPL CALC-SCNC: 13 MMOL/L (ref 10–20)
ANION GAP SERPL CALC-SCNC: 13 MMOL/L (ref 10–20)
APTT PPP: 30 SEC (ref 22–32)
APTT PPP: NORMAL S
APTT PPP: NORMAL S
AST SERPL-CCNC: 32 UNITS/L
BASE DEFICIT BLDA-SCNC: 0 MMOL/L (ref 0–2)
BASE DEFICIT BLDA-SCNC: 0 MMOL/L (ref 0–2)
BASE DEFICIT BLDA-SCNC: 1 MMOL/L (ref 0–2)
BASE DEFICIT BLDA-SCNC: 2 MMOL/L (ref 0–2)
BASE DEFICIT BLDA-SCNC: ABNORMAL MMOL/L
BASE EXCESS BLDA CALC-SCNC: 1 MMOL/L (ref 0–3)
BASE EXCESS BLDA CALC-SCNC: ABNORMAL MMOL/L
BASOPHILS # BLD: 0 K/MCL (ref 0–0.3)
BASOPHILS NFR BLD: 0 %
BDY SITE: ABNORMAL
BILIRUB SERPL-MCNC: 1.3 MG/DL (ref 0.2–1)
BLD GP AB SCN SERPL QL: NEGATIVE
BLD PROD TYP BPU: NORMAL
BLD PROD TYP BPU: NORMAL
BLD UNIT ID BPU: NORMAL
BLD UNIT ID BPU: NORMAL
BLOOD BANK CMNT PATIENT-IMP: NORMAL
BODY TEMPERATURE: 36.6 DEGREES
BODY TEMPERATURE: 36.7 DEGREES
BODY TEMPERATURE: 37 DEGREES
BODY TEMPERATURE: 37 DEGREES
BUN SERPL-MCNC: 40 MG/DL (ref 6–20)
BUN SERPL-MCNC: 42 MG/DL (ref 6–20)
BUN/CREAT SERPL: 24 (ref 7–25)
BUN/CREAT SERPL: 26 (ref 7–25)
CA-I BLD ISE-SCNC: 1.11 MMOL/L (ref 1.15–1.29)
CA-I BLD ISE-SCNC: 1.18 MMOL/L (ref 1.15–1.29)
CA-I BLD ISE-SCNC: 1.22 MMOL/L (ref 1.15–1.29)
CA-I BLDA-SCNC: 13 % (ref 15–23)
CA-I BLDA-SCNC: 14 % (ref 15–23)
CA-I BLDA-SCNC: 16 ML/DL (ref 15–23)
CALCIUM SERPL-MCNC: 8.9 MG/DL (ref 8.4–10.2)
CALCIUM SERPL-MCNC: 9.1 MG/DL (ref 8.4–10.2)
CHLORIDE SERPL-SCNC: 101 MMOL/L (ref 98–107)
CHLORIDE SERPL-SCNC: 102 MMOL/L (ref 98–107)
CK SERPL-CCNC: 39 UNITS/L (ref 26–192)
CO2 SERPL-SCNC: 24 MMOL/L (ref 21–32)
CO2 SERPL-SCNC: 24 MMOL/L (ref 21–32)
COHGB MFR BLD: 1.6 %
COHGB MFR BLD: 1.7 %
COHGB MFR BLD: 1.7 %
COHGB MFR BLD: 1.9 %
COHGB MFR BLD: 2.1 %
CONDITION, POC: ABNORMAL
CONDITION-RC: ABNORMAL
CONDITION: ABNORMAL
CREAT SERPL-MCNC: 1.54 MG/DL (ref 0.51–0.95)
CREAT SERPL-MCNC: 1.77 MG/DL (ref 0.51–0.95)
CROSSMATCH EXPIRE: NORMAL
CRP SERPL-MCNC: 16 MG/DL
D DIMER PPP FEU-MCNC: >35.2 MG/L (FEU)
D DIMER PPP FEU-MCNC: ABNORMAL
DIFFERENTIAL METHOD BLD: ABNORMAL
EOSINOPHIL # BLD: 0 K/MCL (ref 0.1–0.5)
EOSINOPHIL NFR BLD: 0 %
ERYTHROCYTE [DISTWIDTH] IN BLOOD: 14.5 % (ref 11–15)
FERRITIN SERPL-MCNC: 1430 NG/ML (ref 8–252)
FIBRINOGEN PPP-MCNC: 577 MG/DL (ref 190–425)
GLOBULIN SER-MCNC: 4.2 G/DL (ref 2–4)
GLUCOSE BLD-MCNC: 121 MG/DL (ref 70–99)
GLUCOSE BLDC GLUCOMTR-MCNC: 120 MG/DL (ref 70–99)
GLUCOSE BLDC GLUCOMTR-MCNC: 123 MG/DL (ref 70–99)
GLUCOSE BLDC GLUCOMTR-MCNC: 128 MG/DL (ref 70–99)
GLUCOSE BLDC GLUCOMTR-MCNC: 132 MG/DL (ref 70–99)
GLUCOSE BLDC GLUCOMTR-MCNC: 133 MG/DL (ref 70–99)
GLUCOSE BLDC GLUCOMTR-MCNC: ABNORMAL MG/DL
GLUCOSE SERPL-MCNC: 122 MG/DL (ref 65–99)
GLUCOSE SERPL-MCNC: 148 MG/DL (ref 65–99)
HCO3 BLDA-SCNC: 22 MMOL/L (ref 22–28)
HCO3 BLDA-SCNC: 24 MMOL/L (ref 22–28)
HCO3 BLDA-SCNC: 25 MMOL/L (ref 22–28)
HCT VFR BLD CALC: 27.6 % (ref 36–46.5)
HCT VFR BLD CALC: 27.7 % (ref 36–46.5)
HCT VFR BLD CALC: 28.1 % (ref 36–46.5)
HCT VFR BLD CALC: 29.7 % (ref 36–46.5)
HCT VFR BLD CALC: ABNORMAL %
HGB BLD-MCNC: 10.3 G/DL (ref 12–15.5)
HGB BLD-MCNC: 10.3 G/DL (ref 12–15.5)
HGB BLD-MCNC: 10.7 G/DL (ref 12–15.5)
HGB BLD-MCNC: 11 G/DL (ref 12–15.5)
HGB BLD-MCNC: 8.9 G/DL (ref 12–15.5)
HGB BLD-MCNC: 9.2 G/DL (ref 12–15.5)
HGB BLD-MCNC: 9.4 G/DL (ref 12–15.5)
HGB BLD-MCNC: 9.6 G/DL (ref 12–15.5)
HGB BLD-MCNC: 9.9 G/DL (ref 12–15.5)
HOROWITZ INDEX BLD+IHG-RTO: ABNORMAL MM[HG]
INR PPP: 1
INR PPP: NORMAL
LACTATE BLDA-MCNC: 0.8 MMOL/L
LACTATE BLDA-MCNC: 0.9 MMOL/L
LACTATE BLDA-MCNC: 1.8 MMOL/L
LDH SERPL L TO P-CCNC: 638 UNITS/L (ref 82–240)
LYMPHOCYTES # BLD: 2.2 K/MCL (ref 1–4.8)
LYMPHOCYTES NFR BLD: 6 %
MAGNESIUM SERPL-MCNC: 2.8 MG/DL (ref 1.7–2.4)
MAGNESIUM SERPL-MCNC: 2.8 MG/DL (ref 1.7–2.4)
MAGNESIUM SERPL-MCNC: 2.9 MG/DL (ref 1.7–2.4)
MAJ XM SERPL-IMP: NORMAL
MAJ XM SERPL-IMP: NORMAL
MCH RBC QN AUTO: 29.8 PG (ref 26–34)
MCHC RBC AUTO-ENTMCNC: 33.5 G/DL (ref 32–36.5)
MCV RBC AUTO: 89.2 FL (ref 78–100)
METHGB MFR BLD: 0.7 %
METHGB MFR BLD: 0.8 %
METHGB MFR BLD: 0.8 %
METHGB MFR BLD: 1 %
METHGB MFR BLD: 1.6 %
MONOCYTES # BLD: 1.1 K/MCL (ref 0.3–0.9)
MONOCYTES NFR BLD: 4 %
MYELOCYTES NFR BLD: 1 %
NEUTROPHILS # BLD: 23.7 K/MCL (ref 1.8–7.7)
NEUTS BAND NFR BLD: 4 % (ref 0–10)
NEUTS SEG NFR BLD: 83 %
NRBC (NRBCRE): 0 /100 WBC
NT-PROBNP SERPL-MCNC: ABNORMAL PG/ML
NUM BPU REQUESTED: 1
NUM BPU REQUESTED: 2
OXYHGB MFR BLD: 94 % (ref 94–98)
OXYHGB MFR BLD: 94.4 % (ref 94–98)
OXYHGB MFR BLD: 95.9 % (ref 94–98)
OXYHGB MFR BLD: 95.9 % (ref 94–98)
OXYHGB MFR BLDA: 96.3 % (ref 94–98)
PAO2 / FIO2 RATIO (RPFR): ABNORMAL
PATH REV BLD -IMP: ABNORMAL
PCO2 BLDA: 36 MM HG (ref 32–45)
PCO2 BLDA: 37 MM HG (ref 32–45)
PCO2 BLDA: 38 MM HG (ref 32–45)
PH BLDA: 7.4 UNITS (ref 7.35–7.45)
PH BLDA: 7.41 UNITS (ref 7.35–7.45)
PH BLDA: 7.42 UNITS (ref 7.35–7.45)
PH BLDA: 7.42 UNITS (ref 7.35–7.45)
PH BLDA: 7.43 UNITS (ref 7.35–7.45)
PHOSPHATE SERPL-MCNC: 2.5 MG/DL (ref 2.4–4.7)
PHOSPHATE SERPL-MCNC: 2.5 MG/DL (ref 2.4–4.7)
PHOSPHATE SERPL-MCNC: 3 MG/DL (ref 2.4–4.7)
PLAT MORPH BLD: NORMAL
PLATELET # BLD: 203 K/MCL (ref 140–450)
PO2 BLDA: 442 MM HG (ref 83–108)
PO2 BLDA: 67 MM HG (ref 83–108)
PO2 BLDA: 71 MM HG (ref 83–108)
PO2 BLDA: 75 MM HG (ref 83–108)
PO2 BLDA: 76 MM HG (ref 83–108)
POLYCHROMASIA (POLY): ABNORMAL
POTASSIUM BLD-SCNC: 4.9 MMOL/L (ref 3.4–5.1)
POTASSIUM BLD-SCNC: 5.1 MMOL/L (ref 3.4–5.1)
POTASSIUM BLD-SCNC: 5.3 MMOL/L (ref 3.4–5.1)
POTASSIUM BLD-SCNC: 5.6 MMOL/L (ref 3.4–5.1)
POTASSIUM BLD-SCNC: 6.7 MMOL/L (ref 3.4–5.1)
POTASSIUM SERPL-SCNC: 4.7 MMOL/L (ref 3.4–5.1)
POTASSIUM SERPL-SCNC: 5.2 MMOL/L (ref 3.4–5.1)
POTASSIUM SERPL-SCNC: 5.2 MMOL/L (ref 3.4–5.1)
PROCALCITONIN SERPL IA-MCNC: 4.54 NG/ML
PROCALCITONIN SERPL IA-MCNC: ABNORMAL NG/ML
PROT SERPL-MCNC: 7.8 G/DL (ref 6.4–8.2)
PROTHROMBIN TIME (PRT2): NORMAL
PROTHROMBIN TIME: 10.4 SEC (ref 9.7–11.8)
RBC # BLD: 3.15 MIL/MCL (ref 4–5.2)
SAO2 % BLDA: 100 %
SAO2 % BLDA: 96 % (ref 95–99)
SAO2 % BLDA: 97 % (ref 95–99)
SAO2 % BLDA: 98 % (ref 95–99)
SAO2 % BLDA: 99 % (ref 95–99)
SERVICE CMNT-IMP: ABNORMAL
SODIUM BLD-SCNC: 133 MMOL/L (ref 135–145)
SODIUM BLD-SCNC: 134 MMOL/L (ref 135–145)
SODIUM BLD-SCNC: 134 MMOL/L (ref 135–145)
SODIUM BLD-SCNC: 135 MMOL/L (ref 135–145)
SODIUM BLD-SCNC: 136 MMOL/L (ref 135–145)
SODIUM SERPL-SCNC: 133 MMOL/L (ref 135–145)
SODIUM SERPL-SCNC: 134 MMOL/L (ref 135–145)
STATUS OF UNIT: NORMAL
STATUS OF UNIT: NORMAL
TRANSFUSION STATUS: NORMAL
TRANSFUSION STATUS: NORMAL
UNIT DIVISION: 0
UNIT DIVISION: 0
VANCOMYCIN TROUGH SERPL-MCNC: 13.4 MCG/ML (ref 10–20)
VANCOMYCIN TROUGH SERPL-MCNC: NORMAL UG/ML
VARIANT LYMPHS NFR BLD: 2 % (ref 0–5)
WBC # BLD: 27.2 K/MCL (ref 4.2–11)
WBC MORPH BLD: NORMAL

## 2020-07-06 PROCEDURE — 99233 SBSQ HOSP IP/OBS HIGH 50: CPT | Performed by: INTERNAL MEDICINE

## 2020-07-07 LAB
ABO + RH BLD: NORMAL
ALBUMIN SERPL-MCNC: 3.3 G/DL (ref 3.6–5.1)
ALBUMIN/GLOB SERPL: 0.8 {RATIO} (ref 1–2.4)
ALP SERPL-CCNC: 117 UNITS/L (ref 45–117)
ALT SERPL-CCNC: 36 UNITS/L
ANALYZER ANC (IANC): ABNORMAL
ANION GAP SERPL CALC-SCNC: 12 MMOL/L (ref 10–20)
ANION GAP SERPL CALC-SCNC: 13 MMOL/L (ref 10–20)
APTT PPP: 29 SEC (ref 22–32)
APTT PPP: 30 SEC (ref 22–32)
APTT PPP: NORMAL S
AST SERPL-CCNC: 39 UNITS/L
BASE DEFICIT BLDA-SCNC: 0 MMOL/L (ref 0–2)
BASE DEFICIT BLDA-SCNC: 1 MMOL/L (ref 0–2)
BASE DEFICIT BLDA-SCNC: 1 MMOL/L (ref 0–2)
BASE DEFICIT BLDA-SCNC: 2 MMOL/L (ref 0–2)
BASE DEFICIT BLDA-SCNC: 2 MMOL/L (ref 0–2)
BASE DEFICIT BLDA-SCNC: ABNORMAL MMOL/L
BASE EXCESS BLDA CALC-SCNC: 0 MMOL/L (ref 0–3)
BASE EXCESS BLDA CALC-SCNC: ABNORMAL MMOL/L
BDY SITE: ABNORMAL
BILIRUB SERPL-MCNC: 1.1 MG/DL (ref 0.2–1)
BLD GP AB SCN SERPL QL: NEGATIVE
BLD PROD TYP BPU: NORMAL
BLD UNIT ID BPU: NORMAL
BODY TEMPERATURE: 36.5 DEGREES
BODY TEMPERATURE: 37 DEGREES
BODY TEMPERATURE: 37.4 DEGREES
BUN SERPL-MCNC: 45 MG/DL (ref 6–20)
BUN SERPL-MCNC: 55 MG/DL (ref 6–20)
BUN/CREAT SERPL: 26 (ref 7–25)
BUN/CREAT SERPL: 29 (ref 7–25)
CA-I BLD ISE-SCNC: 1.18 MMOL/L (ref 1.15–1.29)
CA-I BLD ISE-SCNC: 1.2 MMOL/L (ref 1.15–1.29)
CA-I BLD ISE-SCNC: 1.21 MMOL/L (ref 1.15–1.29)
CA-I BLD ISE-SCNC: 1.23 MMOL/L (ref 1.15–1.29)
CA-I BLD ISE-SCNC: 1.25 MMOL/L (ref 1.15–1.29)
CA-I BLD ISE-SCNC: 1.27 MMOL/L (ref 1.15–1.29)
CA-I BLDA-SCNC: 14 % (ref 15–23)
CA-I BLDA-SCNC: 15 % (ref 15–23)
CA-I BLDA-SCNC: 15 ML/DL (ref 15–23)
CALCIUM SERPL-MCNC: 8.8 MG/DL (ref 8.4–10.2)
CALCIUM SERPL-MCNC: 9.4 MG/DL (ref 8.4–10.2)
CHLORIDE SERPL-SCNC: 103 MMOL/L (ref 98–107)
CHLORIDE SERPL-SCNC: 103 MMOL/L (ref 98–107)
CK SERPL-CCNC: 32 UNITS/L (ref 26–192)
CO2 SERPL-SCNC: 23 MMOL/L (ref 21–32)
CO2 SERPL-SCNC: 24 MMOL/L (ref 21–32)
COHGB MFR BLD: 1.7 %
COHGB MFR BLD: 1.7 %
COHGB MFR BLD: 1.9 %
COHGB MFR BLD: 2 %
COHGB MFR BLD: 2.5 %
COHGB MFR BLD: 3 %
CONDITION, POC: ABNORMAL
CONDITION-RC: ABNORMAL
CONDITION: ABNORMAL
CREAT SERPL-MCNC: 1.7 MG/DL (ref 0.51–0.95)
CREAT SERPL-MCNC: 1.9 MG/DL (ref 0.51–0.95)
CROSSMATCH EXPIRE: NORMAL
CRP SERPL-MCNC: 12 MG/DL
D DIMER PPP FEU-MCNC: >35.2 MG/L (FEU)
D DIMER PPP FEU-MCNC: ABNORMAL
ERYTHROCYTE [DISTWIDTH] IN BLOOD: 14.6 % (ref 11–15)
FERRITIN SERPL-MCNC: 1409 NG/ML (ref 8–252)
FIBRINOGEN PPP-MCNC: 475 MG/DL (ref 190–425)
GLOBULIN SER-MCNC: 4.2 G/DL (ref 2–4)
GLUCOSE BLD-MCNC: 120 MG/DL (ref 70–99)
GLUCOSE BLDC GLUCOMTR-MCNC: 106 MG/DL (ref 70–99)
GLUCOSE BLDC GLUCOMTR-MCNC: 109 MG/DL (ref 70–99)
GLUCOSE BLDC GLUCOMTR-MCNC: 110 MG/DL (ref 70–99)
GLUCOSE BLDC GLUCOMTR-MCNC: 115 MG/DL (ref 70–99)
GLUCOSE BLDC GLUCOMTR-MCNC: 116 MG/DL (ref 70–99)
GLUCOSE BLDC GLUCOMTR-MCNC: 68 MG/DL (ref 70–99)
GLUCOSE SERPL-MCNC: 121 MG/DL (ref 65–99)
GLUCOSE SERPL-MCNC: 99 MG/DL (ref 65–99)
HCO3 BLDA-SCNC: 23 MMOL/L (ref 22–28)
HCO3 BLDA-SCNC: 24 MMOL/L (ref 22–28)
HCO3 BLDA-SCNC: 25 MMOL/L (ref 22–28)
HCT VFR BLD CALC: 29.4 % (ref 36–46.5)
HCT VFR BLD CALC: 30.5 % (ref 36–46.5)
HCT VFR BLD CALC: 30.6 % (ref 36–46.5)
HCT VFR BLD CALC: 30.6 % (ref 36–46.5)
HCT VFR BLD CALC: ABNORMAL %
HGB BLD-MCNC: 10 G/DL (ref 12–15.5)
HGB BLD-MCNC: 10.2 G/DL (ref 12–15.5)
HGB BLD-MCNC: 10.2 G/DL (ref 12–15.5)
HGB BLD-MCNC: 10.4 G/DL (ref 12–15.5)
HGB BLD-MCNC: 10.6 G/DL (ref 12–15.5)
HGB BLD-MCNC: 10.7 G/DL (ref 12–15.5)
HGB BLD-MCNC: 9.6 G/DL (ref 12–15.5)
HGB BLD-MCNC: 9.9 G/DL (ref 12–15.5)
HOROWITZ INDEX BLD+IHG-RTO: ABNORMAL MM[HG]
INR PPP: 1
INR PPP: NORMAL
LACTATE BLDA-MCNC: 0.7 MMOL/L
LACTATE BLDA-MCNC: 0.8 MMOL/L
LACTATE BLDA-MCNC: 0.9 MMOL/L
LACTATE BLDA-MCNC: 1 MMOL/L
LDH SERPL L TO P-CCNC: 610 UNITS/L (ref 82–240)
MAGNESIUM SERPL-MCNC: 2.7 MG/DL (ref 1.7–2.4)
MAGNESIUM SERPL-MCNC: 2.8 MG/DL (ref 1.7–2.4)
MAGNESIUM SERPL-MCNC: 2.8 MG/DL (ref 1.7–2.4)
MAJ XM SERPL-IMP: NORMAL
MCH RBC QN AUTO: 29.7 PG (ref 26–34)
MCHC RBC AUTO-ENTMCNC: 32.7 G/DL (ref 32–36.5)
MCV RBC AUTO: 91 FL (ref 78–100)
METHGB MFR BLD: 0 %
METHGB MFR BLD: 0.3 %
METHGB MFR BLD: 0.5 %
METHGB MFR BLD: 0.7 %
METHGB MFR BLD: 0.8 %
METHGB MFR BLD: 0.8 %
NRBC (NRBCRE): 0 /100 WBC
NT-PROBNP SERPL-MCNC: ABNORMAL PG/ML
NUM BPU REQUESTED: 1
OXYHGB MFR BLD: 95.6 % (ref 94–98)
OXYHGB MFR BLD: 95.7 % (ref 94–98)
OXYHGB MFR BLD: 96.3 % (ref 94–98)
OXYHGB MFR BLD: 96.6 % (ref 94–98)
OXYHGB MFR BLD: 96.9 % (ref 94–98)
OXYHGB MFR BLDA: 97.2 % (ref 94–98)
PAO2 / FIO2 RATIO (RPFR): ABNORMAL
PCO2 BLDA: 36 MM HG (ref 32–45)
PCO2 BLDA: 38 MM HG (ref 32–45)
PCO2 BLDA: 40 MM HG (ref 32–45)
PCO2 BLDA: 42 MM HG (ref 32–45)
PCO2 BLDA: 42 MM HG (ref 32–45)
PCO2 BLDA: 43 MM HG (ref 32–45)
PH BLDA: 7.35 UNITS (ref 7.35–7.45)
PH BLDA: 7.37 UNITS (ref 7.35–7.45)
PH BLDA: 7.37 UNITS (ref 7.35–7.45)
PH BLDA: 7.39 UNITS (ref 7.35–7.45)
PH BLDA: 7.4 UNITS (ref 7.35–7.45)
PH BLDA: 7.43 UNITS (ref 7.35–7.45)
PHOSPHATE SERPL-MCNC: 2.6 MG/DL (ref 2.4–4.7)
PHOSPHATE SERPL-MCNC: 2.7 MG/DL (ref 2.4–4.7)
PHOSPHATE SERPL-MCNC: 3.1 MG/DL (ref 2.4–4.7)
PLATELET # BLD: 191 K/MCL (ref 140–450)
PO2 BLDA: 113 MM HG (ref 83–108)
PO2 BLDA: 286 MM HG (ref 83–108)
PO2 BLDA: 76 MM HG (ref 83–108)
PO2 BLDA: 83 MM HG (ref 83–108)
PO2 BLDA: 95 MM HG (ref 83–108)
PO2 BLDA: 97 MM HG (ref 83–108)
POTASSIUM BLD-SCNC: 5.1 MMOL/L (ref 3.4–5.1)
POTASSIUM BLD-SCNC: 5.1 MMOL/L (ref 3.4–5.1)
POTASSIUM BLD-SCNC: 5.2 MMOL/L (ref 3.4–5.1)
POTASSIUM BLD-SCNC: 5.4 MMOL/L (ref 3.4–5.1)
POTASSIUM BLD-SCNC: 5.5 MMOL/L (ref 3.4–5.1)
POTASSIUM BLD-SCNC: 5.8 MMOL/L (ref 3.4–5.1)
POTASSIUM SERPL-SCNC: 4.8 MMOL/L (ref 3.4–5.1)
POTASSIUM SERPL-SCNC: 5 MMOL/L (ref 3.4–5.1)
POTASSIUM SERPL-SCNC: 5.3 MMOL/L (ref 3.4–5.1)
PROCALCITONIN SERPL IA-MCNC: 3.31 NG/ML
PROCALCITONIN SERPL IA-MCNC: ABNORMAL NG/ML
PROT SERPL-MCNC: 7.5 G/DL (ref 6.4–8.2)
PROTHROMBIN TIME (PRT2): NORMAL
PROTHROMBIN TIME: 10.4 SEC (ref 9.7–11.8)
RBC # BLD: 3.23 MIL/MCL (ref 4–5.2)
SAO2 % BLDA: 100 %
SAO2 % BLDA: 100 % (ref 95–99)
SAO2 % BLDA: 98 % (ref 95–99)
SAO2 % BLDA: 98 % (ref 95–99)
SAO2 % BLDA: 99 % (ref 95–99)
SAO2 % BLDA: 99 % (ref 95–99)
SERVICE CMNT-IMP: ABNORMAL
SODIUM BLD-SCNC: 132 MMOL/L (ref 135–145)
SODIUM BLD-SCNC: 132 MMOL/L (ref 135–145)
SODIUM BLD-SCNC: 133 MMOL/L (ref 135–145)
SODIUM BLD-SCNC: 133 MMOL/L (ref 135–145)
SODIUM BLD-SCNC: 134 MMOL/L (ref 135–145)
SODIUM BLD-SCNC: 134 MMOL/L (ref 135–145)
SODIUM SERPL-SCNC: 134 MMOL/L (ref 135–145)
SODIUM SERPL-SCNC: 134 MMOL/L (ref 135–145)
STATUS OF UNIT: NORMAL
TRANSFUSION STATUS: NORMAL
UNIT DIVISION: 0
WBC # BLD: 23.1 K/MCL (ref 4.2–11)

## 2020-07-07 PROCEDURE — 99233 SBSQ HOSP IP/OBS HIGH 50: CPT | Performed by: INTERNAL MEDICINE

## 2020-07-08 LAB
ALBUMIN SERPL-MCNC: 3.3 G/DL (ref 3.6–5.1)
ALBUMIN/GLOB SERPL: 0.8 {RATIO} (ref 1–2.4)
ALP SERPL-CCNC: 125 UNITS/L (ref 45–117)
ALT SERPL-CCNC: 77 UNITS/L
ANALYZER ANC (IANC): ABNORMAL
ANION GAP SERPL CALC-SCNC: 12 MMOL/L (ref 10–20)
ANION GAP SERPL CALC-SCNC: 13 MMOL/L (ref 10–20)
APTT PPP: 36 SEC (ref 22–32)
APTT PPP: ABNORMAL S
APTT PPP: ABNORMAL S
AST SERPL-CCNC: 70 UNITS/L
BASE DEFICIT BLDA-SCNC: 0 MMOL/L (ref 0–2)
BASE DEFICIT BLDA-SCNC: 1 MMOL/L (ref 0–2)
BASE DEFICIT BLDA-SCNC: 1 MMOL/L (ref 0–2)
BASE DEFICIT BLDA-SCNC: 2 MMOL/L (ref 0–2)
BASE DEFICIT BLDA-SCNC: 3 MMOL/L (ref 0–2)
BASE EXCESS BLDA CALC-SCNC: ABNORMAL MMOL/L
BDY SITE: ABNORMAL
BILIRUB SERPL-MCNC: 1 MG/DL (ref 0.2–1)
BODY TEMPERATURE: 36.6 DEGREES
BODY TEMPERATURE: 37 DEGREES
BODY TEMPERATURE: 37.4 DEGREES
BODY TEMPERATURE: 37.5 DEGREES
BUN SERPL-MCNC: 58 MG/DL (ref 6–20)
BUN SERPL-MCNC: 60 MG/DL (ref 6–20)
BUN/CREAT SERPL: 31 (ref 7–25)
BUN/CREAT SERPL: 32 (ref 7–25)
CA-I BLD ISE-SCNC: 1.22 MMOL/L (ref 1.15–1.29)
CA-I BLD ISE-SCNC: 1.24 MMOL/L (ref 1.15–1.29)
CA-I BLD ISE-SCNC: 1.24 MMOL/L (ref 1.15–1.29)
CA-I BLD ISE-SCNC: 1.26 MMOL/L (ref 1.15–1.29)
CA-I BLD ISE-SCNC: 1.3 MMOL/L (ref 1.15–1.29)
CA-I BLDA-SCNC: 14 % (ref 15–23)
CA-I BLDA-SCNC: 14 % (ref 15–23)
CA-I BLDA-SCNC: 15 % (ref 15–23)
CA-I BLDA-SCNC: 15 % (ref 15–23)
CA-I BLDA-SCNC: 16 ML/DL (ref 15–23)
CALCIUM SERPL-MCNC: 9.2 MG/DL (ref 8.4–10.2)
CALCIUM SERPL-MCNC: 9.2 MG/DL (ref 8.4–10.2)
CHLORIDE SERPL-SCNC: 101 MMOL/L (ref 98–107)
CHLORIDE SERPL-SCNC: 101 MMOL/L (ref 98–107)
CK SERPL-CCNC: 31 UNITS/L (ref 26–192)
CO2 SERPL-SCNC: 24 MMOL/L (ref 21–32)
CO2 SERPL-SCNC: 24 MMOL/L (ref 21–32)
COHGB MFR BLD: 1.6 %
COHGB MFR BLD: 1.6 %
COHGB MFR BLD: 1.9 %
COHGB MFR BLD: 2 %
COHGB MFR BLD: 2.2 %
CONDITION, POC: ABNORMAL
CONDITION-RC: ABNORMAL
CONDITION: ABNORMAL
CREAT SERPL-MCNC: 1.9 MG/DL (ref 0.51–0.95)
CREAT SERPL-MCNC: 1.9 MG/DL (ref 0.51–0.95)
CRP SERPL-MCNC: 13 MG/DL
D DIMER PPP FEU-MCNC: >35.2 MG/L (FEU)
D DIMER PPP FEU-MCNC: ABNORMAL
ERYTHROCYTE [DISTWIDTH] IN BLOOD: 14.4 % (ref 11–15)
FERRITIN SERPL-MCNC: 1560 NG/ML (ref 8–252)
FIBRINOGEN PPP-MCNC: 441 MG/DL (ref 190–425)
GLOBULIN SER-MCNC: 4.3 G/DL (ref 2–4)
GLUCOSE BLDC GLUCOMTR-MCNC: 110 MG/DL (ref 70–99)
GLUCOSE BLDC GLUCOMTR-MCNC: 112 MG/DL (ref 70–99)
GLUCOSE BLDC GLUCOMTR-MCNC: 126 MG/DL (ref 70–99)
GLUCOSE BLDC GLUCOMTR-MCNC: 90 MG/DL (ref 70–99)
GLUCOSE BLDC GLUCOMTR-MCNC: ABNORMAL MG/DL
GLUCOSE BLDC GLUCOMTR-MCNC: ABNORMAL MG/DL
GLUCOSE SERPL-MCNC: 114 MG/DL (ref 65–99)
GLUCOSE SERPL-MCNC: 124 MG/DL (ref 65–99)
HCO3 BLDA-SCNC: 21 MMOL/L (ref 22–28)
HCO3 BLDA-SCNC: 23 MMOL/L (ref 22–28)
HCO3 BLDA-SCNC: 24 MMOL/L (ref 22–28)
HCT VFR BLD CALC: 30.6 % (ref 36–46.5)
HCT VFR BLD CALC: 30.8 % (ref 36–46.5)
HCT VFR BLD CALC: ABNORMAL %
HGB BLD-MCNC: 10.1 G/DL (ref 12–15.5)
HGB BLD-MCNC: 10.2 G/DL (ref 12–15.5)
HGB BLD-MCNC: 10.3 G/DL (ref 12–15.5)
HGB BLD-MCNC: 10.4 G/DL (ref 12–15.5)
HGB BLD-MCNC: 10.5 G/DL (ref 12–15.5)
HGB BLD-MCNC: 10.8 G/DL (ref 12–15.5)
HGB BLD-MCNC: 10.9 G/DL (ref 12–15.5)
HOROWITZ INDEX BLD+IHG-RTO: ABNORMAL MM[HG]
INR PPP: 1.1
INR PPP: NORMAL
LACTATE BLDA-MCNC: 0.5 MMOL/L
LACTATE BLDA-MCNC: 0.6 MMOL/L
LACTATE BLDA-MCNC: 0.7 MMOL/L
LACTATE BLDA-MCNC: 0.9 MMOL/L
LDH SERPL L TO P-CCNC: 691 UNITS/L (ref 82–240)
MAGNESIUM SERPL-MCNC: 2.6 MG/DL (ref 1.7–2.4)
MAGNESIUM SERPL-MCNC: 2.7 MG/DL (ref 1.7–2.4)
MCH RBC QN AUTO: 29.9 PG (ref 26–34)
MCHC RBC AUTO-ENTMCNC: 32.8 G/DL (ref 32–36.5)
MCV RBC AUTO: 91.1 FL (ref 78–100)
METHGB MFR BLD: 0.6 %
METHGB MFR BLD: 0.9 %
METHGB MFR BLD: 1.1 %
METHGB MFR BLD: 1.2 %
METHGB MFR BLD: 1.2 %
NRBC (NRBCRE): 0 /100 WBC
NT-PROBNP SERPL-MCNC: 6437 PG/ML
OXYHGB MFR BLD: 96 % (ref 94–98)
OXYHGB MFR BLD: 96.2 % (ref 94–98)
OXYHGB MFR BLD: 96.6 % (ref 94–98)
OXYHGB MFR BLD: 96.7 % (ref 94–98)
OXYHGB MFR BLDA: 97.3 % (ref 94–98)
PAO2 / FIO2 RATIO (RPFR): ABNORMAL
PCO2 BLDA: 33 MM HG (ref 32–45)
PCO2 BLDA: 37 MM HG (ref 32–45)
PCO2 BLDA: 39 MM HG (ref 32–45)
PCO2 BLDA: 39 MM HG (ref 32–45)
PCO2 BLDA: 42 MM HG (ref 32–45)
PH BLDA: 7.37 UNITS (ref 7.35–7.45)
PH BLDA: 7.38 UNITS (ref 7.35–7.45)
PH BLDA: 7.39 UNITS (ref 7.35–7.45)
PH BLDA: 7.42 UNITS (ref 7.35–7.45)
PH BLDA: 7.42 UNITS (ref 7.35–7.45)
PHOSPHATE SERPL-MCNC: 3.4 MG/DL (ref 2.4–4.7)
PHOSPHATE SERPL-MCNC: 3.5 MG/DL (ref 2.4–4.7)
PLATELET # BLD: 185 K/MCL (ref 140–450)
PO2 BLDA: 112 MM HG (ref 83–108)
PO2 BLDA: 165 MM HG (ref 83–108)
PO2 BLDA: 189 MM HG (ref 83–108)
PO2 BLDA: 392 MM HG (ref 83–108)
PO2 BLDA: 92 MM HG (ref 83–108)
POTASSIUM BLD-SCNC: 4.8 MMOL/L (ref 3.4–5.1)
POTASSIUM BLD-SCNC: 4.9 MMOL/L (ref 3.4–5.1)
POTASSIUM BLD-SCNC: 5.3 MMOL/L (ref 3.4–5.1)
POTASSIUM BLD-SCNC: 5.4 MMOL/L (ref 3.4–5.1)
POTASSIUM BLD-SCNC: 5.6 MMOL/L (ref 3.4–5.1)
POTASSIUM SERPL-SCNC: 4.5 MMOL/L (ref 3.4–5.1)
POTASSIUM SERPL-SCNC: 5.2 MMOL/L (ref 3.4–5.1)
PROCALCITONIN SERPL IA-MCNC: 2.37 NG/ML
PROCALCITONIN SERPL IA-MCNC: ABNORMAL NG/ML
PROT SERPL-MCNC: 7.6 G/DL (ref 6.4–8.2)
PROTHROMBIN TIME (PRT2): NORMAL
PROTHROMBIN TIME: 11.2 SEC (ref 9.7–11.8)
RBC # BLD: 3.38 MIL/MCL (ref 4–5.2)
SAO2 % BLDA: 100 %
SAO2 % BLDA: 100 % (ref 95–99)
SAO2 % BLDA: 99 % (ref 95–99)
SERVICE CMNT-IMP: ABNORMAL
SODIUM BLD-SCNC: 133 MMOL/L (ref 135–145)
SODIUM BLD-SCNC: 134 MMOL/L (ref 135–145)
SODIUM BLD-SCNC: 135 MMOL/L (ref 135–145)
SODIUM SERPL-SCNC: 133 MMOL/L (ref 135–145)
SODIUM SERPL-SCNC: 133 MMOL/L (ref 135–145)
VANCOMYCIN TROUGH SERPL-MCNC: 24.6 MCG/ML (ref 10–20)
VANCOMYCIN TROUGH SERPL-MCNC: ABNORMAL UG/ML
WBC # BLD: 21.8 K/MCL (ref 4.2–11)

## 2020-07-08 PROCEDURE — 99233 SBSQ HOSP IP/OBS HIGH 50: CPT | Performed by: INTERNAL MEDICINE

## 2020-07-09 LAB
ALBUMIN SERPL-MCNC: 3.3 G/DL (ref 3.6–5.1)
ALBUMIN/GLOB SERPL: 0.8 {RATIO} (ref 1–2.4)
ALP SERPL-CCNC: 126 UNITS/L (ref 45–117)
ALT SERPL-CCNC: 95 UNITS/L
ANALYZER ANC (IANC): ABNORMAL
ANION GAP SERPL CALC-SCNC: 10 MMOL/L (ref 10–20)
ANION GAP SERPL CALC-SCNC: 13 MMOL/L (ref 10–20)
APTT PPP: 32 SEC (ref 22–32)
APTT PPP: NORMAL S
APTT PPP: NORMAL S
AST SERPL-CCNC: 71 UNITS/L
BASE DEFICIT BLDA-SCNC: 0 MMOL/L (ref 0–2)
BASE DEFICIT BLDA-SCNC: 1 MMOL/L (ref 0–2)
BASE DEFICIT BLDA-SCNC: 1 MMOL/L (ref 0–2)
BASE DEFICIT BLDA-SCNC: 2 MMOL/L (ref 0–2)
BASE EXCESS BLDA CALC-SCNC: ABNORMAL MMOL/L
BASOPHILS # BLD: 0 K/MCL (ref 0–0.3)
BASOPHILS NFR BLD: 0 %
BDY SITE: ABNORMAL
BILIRUB SERPL-MCNC: 1.3 MG/DL (ref 0.2–1)
BODY TEMPERATURE: 36.6 DEGREES
BODY TEMPERATURE: 37.2 DEGREES
BODY TEMPERATURE: 37.7 DEGREES
BUN SERPL-MCNC: 49 MG/DL (ref 6–20)
BUN SERPL-MCNC: 57 MG/DL (ref 6–20)
BUN/CREAT SERPL: 28 (ref 7–25)
BUN/CREAT SERPL: 30 (ref 7–25)
CA-I BLD ISE-SCNC: 1.24 MMOL/L (ref 1.15–1.29)
CA-I BLD ISE-SCNC: 1.24 MMOL/L (ref 1.15–1.29)
CA-I BLD ISE-SCNC: 1.25 MMOL/L (ref 1.15–1.29)
CA-I BLD ISE-SCNC: 1.27 MMOL/L (ref 1.15–1.29)
CA-I BLDA-SCNC: 14 % (ref 15–23)
CA-I BLDA-SCNC: 14 % (ref 15–23)
CA-I BLDA-SCNC: 15 % (ref 15–23)
CA-I BLDA-SCNC: 15 ML/DL (ref 15–23)
CALCIUM SERPL-MCNC: 9.1 MG/DL (ref 8.4–10.2)
CALCIUM SERPL-MCNC: 9.4 MG/DL (ref 8.4–10.2)
CHLORIDE SERPL-SCNC: 101 MMOL/L (ref 98–107)
CHLORIDE SERPL-SCNC: 101 MMOL/L (ref 98–107)
CK SERPL-CCNC: 23 UNITS/L (ref 26–192)
CO2 SERPL-SCNC: 24 MMOL/L (ref 21–32)
CO2 SERPL-SCNC: 26 MMOL/L (ref 21–32)
COHGB MFR BLD: 1.3 %
COHGB MFR BLD: 1.4 %
COHGB MFR BLD: 1.9 %
COHGB MFR BLD: 2 %
CONDITION, POC: ABNORMAL
CONDITION-RC: ABNORMAL
CONDITION: ABNORMAL
CREAT SERPL-MCNC: 1.74 MG/DL (ref 0.51–0.95)
CREAT SERPL-MCNC: 1.89 MG/DL (ref 0.51–0.95)
CRP SERPL-MCNC: 14 MG/DL
D DIMER PPP FEU-MCNC: >35.2 MG/L (FEU)
D DIMER PPP FEU-MCNC: ABNORMAL
DIFFERENTIAL METHOD BLD: ABNORMAL
EOSINOPHIL # BLD: 0.5 K/MCL (ref 0.1–0.5)
EOSINOPHIL NFR BLD: 2 %
ERYTHROCYTE [DISTWIDTH] IN BLOOD: 13.9 % (ref 11–15)
FERRITIN SERPL-MCNC: 1850 NG/ML (ref 8–252)
FIBRINOGEN PPP-MCNC: 353 MG/DL (ref 190–425)
GLOBULIN SER-MCNC: 4.4 G/DL (ref 2–4)
GLUCOSE BLD-MCNC: 113 MG/DL (ref 70–99)
GLUCOSE BLDC GLUCOMTR-MCNC: 111 MG/DL (ref 70–99)
GLUCOSE BLDC GLUCOMTR-MCNC: 118 MG/DL (ref 70–99)
GLUCOSE BLDC GLUCOMTR-MCNC: ABNORMAL MG/DL
GLUCOSE SERPL-MCNC: 107 MG/DL (ref 65–99)
GLUCOSE SERPL-MCNC: 109 MG/DL (ref 65–99)
HCO3 BLDA-SCNC: 22 MMOL/L (ref 22–28)
HCO3 BLDA-SCNC: 24 MMOL/L (ref 22–28)
HCT VFR BLD CALC: 29.6 % (ref 36–46.5)
HCT VFR BLD CALC: 29.8 % (ref 36–46.5)
HCT VFR BLD CALC: ABNORMAL %
HGB BLD-MCNC: 10 G/DL (ref 12–15.5)
HGB BLD-MCNC: 10.3 G/DL (ref 12–15.5)
HGB BLD-MCNC: 10.4 G/DL (ref 12–15.5)
HGB BLD-MCNC: 10.6 G/DL (ref 12–15.5)
HGB BLD-MCNC: 9.7 G/DL (ref 12–15.5)
HGB BLD-MCNC: 9.7 G/DL (ref 12–15.5)
HOROWITZ INDEX BLD+IHG-RTO: ABNORMAL MM[HG]
INR PPP: 1.1
INR PPP: NORMAL
LACTATE BLDA-MCNC: 0.6 MMOL/L
LACTATE BLDA-MCNC: 0.7 MMOL/L
LACTATE BLDA-MCNC: 0.7 MMOL/L
LACTATE BLDA-MCNC: 1.5 MMOL/L
LDH SERPL L TO P-CCNC: 701 UNITS/L (ref 82–240)
LYMPHOCYTES # BLD: 0.9 K/MCL (ref 1–4.8)
LYMPHOCYTES NFR BLD: 4 %
MAGNESIUM SERPL-MCNC: 2.7 MG/DL (ref 1.7–2.4)
MAGNESIUM SERPL-MCNC: 2.8 MG/DL (ref 1.7–2.4)
MCH RBC QN AUTO: 29 PG (ref 26–34)
MCHC RBC AUTO-ENTMCNC: 32.6 G/DL (ref 32–36.5)
MCV RBC AUTO: 89.2 FL (ref 78–100)
METAMYELOCYTES NFR BLD: 1 % (ref 0–2)
METHGB MFR BLD: 0.6 %
METHGB MFR BLD: 0.8 %
METHGB MFR BLD: 0.9 %
METHGB MFR BLD: 0.9 %
MONOCYTES # BLD: 1.4 K/MCL (ref 0.3–0.9)
MONOCYTES NFR BLD: 6 %
MYELOCYTES NFR BLD: 2 %
NEUTROPHILS # BLD: 19.1 K/MCL (ref 1.8–7.7)
NEUTS SEG NFR BLD: 85 %
NRBC (NRBCRE): 0 /100 WBC
NT-PROBNP SERPL-MCNC: 3099 PG/ML
OXYHGB MFR BLD: 96.9 % (ref 94–98)
OXYHGB MFR BLD: 97 % (ref 94–98)
OXYHGB MFR BLD: 97.1 % (ref 94–98)
OXYHGB MFR BLDA: 97.4 % (ref 94–98)
PAO2 / FIO2 RATIO (RPFR): ABNORMAL
PATH REV BLD -IMP: ABNORMAL
PCO2 BLDA: 33 MM HG (ref 32–45)
PCO2 BLDA: 38 MM HG (ref 32–45)
PCO2 BLDA: 39 MM HG (ref 32–45)
PCO2 BLDA: 39 MM HG (ref 32–45)
PH BLDA: 7.4 UNITS (ref 7.35–7.45)
PH BLDA: 7.4 UNITS (ref 7.35–7.45)
PH BLDA: 7.41 UNITS (ref 7.35–7.45)
PH BLDA: 7.43 UNITS (ref 7.35–7.45)
PHOSPHATE SERPL-MCNC: 2.9 MG/DL (ref 2.4–4.7)
PHOSPHATE SERPL-MCNC: 3.1 MG/DL (ref 2.4–4.7)
PLAT MORPH BLD: NORMAL
PLATELET # BLD: 170 K/MCL (ref 140–450)
PO2 BLDA: 159 MM HG (ref 83–108)
PO2 BLDA: 180 MM HG (ref 83–108)
PO2 BLDA: 196 MM HG (ref 83–108)
PO2 BLDA: 299 MM HG (ref 83–108)
POLYCHROMASIA (POLY): ABNORMAL
POTASSIUM BLD-SCNC: 4.4 MMOL/L (ref 3.4–5.1)
POTASSIUM BLD-SCNC: 4.9 MMOL/L (ref 3.4–5.1)
POTASSIUM SERPL-SCNC: 4.2 MMOL/L (ref 3.4–5.1)
POTASSIUM SERPL-SCNC: 4.5 MMOL/L (ref 3.4–5.1)
PROCALCITONIN SERPL IA-MCNC: 2.07 NG/ML
PROCALCITONIN SERPL IA-MCNC: ABNORMAL NG/ML
PROT SERPL-MCNC: 7.7 G/DL (ref 6.4–8.2)
PROTHROMBIN TIME (PRT2): NORMAL
PROTHROMBIN TIME: 11.2 SEC (ref 9.7–11.8)
RBC # BLD: 3.34 MIL/MCL (ref 4–5.2)
SAO2 % BLDA: 100 %
SAO2 % BLDA: 100 % (ref 95–99)
SAO2 % BLDA: 99 % (ref 95–99)
SAO2 % BLDA: 99 % (ref 95–99)
SERVICE CMNT-IMP: NORMAL
SODIUM BLD-SCNC: 132 MMOL/L (ref 135–145)
SODIUM BLD-SCNC: 132 MMOL/L (ref 135–145)
SODIUM BLD-SCNC: 133 MMOL/L (ref 135–145)
SODIUM BLD-SCNC: 133 MMOL/L (ref 135–145)
SODIUM SERPL-SCNC: 132 MMOL/L (ref 135–145)
SODIUM SERPL-SCNC: 134 MMOL/L (ref 135–145)
VANCOMYCIN SERPL-MCNC: 9.2 MCG/ML
VANCOMYCIN SERPL-MCNC: NORMAL UG/ML
WBC # BLD: 22.5 K/MCL (ref 4.2–11)
WBC MORPH BLD: NORMAL

## 2020-07-09 PROCEDURE — 99233 SBSQ HOSP IP/OBS HIGH 50: CPT | Performed by: INTERNAL MEDICINE

## 2020-07-09 PROCEDURE — X1094 NO CHARGE VISIT: HCPCS | Performed by: INTERNAL MEDICINE

## 2020-07-10 LAB
ALBUMIN SERPL-MCNC: 3.4 G/DL (ref 3.6–5.1)
ALBUMIN/GLOB SERPL: 0.8 {RATIO} (ref 1–2.4)
ALP SERPL-CCNC: 116 UNITS/L (ref 45–117)
ALT SERPL-CCNC: 108 UNITS/L
ANALYZER ANC (IANC): ABNORMAL
ANION GAP SERPL CALC-SCNC: 15 MMOL/L (ref 10–20)
ANION GAP SERPL CALC-SCNC: 15 MMOL/L (ref 10–20)
APTT PPP: 31 SEC (ref 22–32)
APTT PPP: NORMAL S
APTT PPP: NORMAL S
AST SERPL-CCNC: 75 UNITS/L
BASE DEFICIT BLDA-SCNC: 0 MMOL/L (ref 0–2)
BASE DEFICIT BLDA-SCNC: 1 MMOL/L (ref 0–2)
BASE DEFICIT BLDA-SCNC: 2 MMOL/L (ref 0–2)
BASE DEFICIT BLDA-SCNC: 4 MMOL/L (ref 0–2)
BASE DEFICIT BLDA-SCNC: ABNORMAL MMOL/L
BASE EXCESS BLDA CALC-SCNC: 0 MMOL/L (ref 0–3)
BASE EXCESS BLDA CALC-SCNC: ABNORMAL MMOL/L
BASOPHILS # BLD: 0 K/MCL (ref 0–0.3)
BASOPHILS NFR BLD: 0 %
BDY SITE: ABNORMAL
BILIRUB SERPL-MCNC: 1.5 MG/DL (ref 0.2–1)
BODY TEMPERATURE: 36.4 DEGREES
BODY TEMPERATURE: 36.5 DEGREES
BODY TEMPERATURE: 37 DEGREES
BODY TEMPERATURE: 37 DEGREES
BUN SERPL-MCNC: 43 MG/DL (ref 6–20)
BUN SERPL-MCNC: 45 MG/DL (ref 6–20)
BUN/CREAT SERPL: 23 (ref 7–25)
BUN/CREAT SERPL: 23 (ref 7–25)
CA-I BLD ISE-SCNC: 1.2 MMOL/L (ref 1.15–1.29)
CA-I BLD ISE-SCNC: 1.21 MMOL/L (ref 1.15–1.29)
CA-I BLD ISE-SCNC: 1.23 MMOL/L (ref 1.15–1.29)
CA-I BLD ISE-SCNC: 1.24 MMOL/L (ref 1.15–1.29)
CA-I BLD ISE-SCNC: 1.27 MMOL/L (ref 1.15–1.29)
CA-I BLDA-SCNC: 13 % (ref 15–23)
CA-I BLDA-SCNC: 14 % (ref 15–23)
CA-I BLDA-SCNC: 14 % (ref 15–23)
CA-I BLDA-SCNC: 14 ML/DL (ref 15–23)
CA-I BLDA-SCNC: 15 % (ref 15–23)
CALCIUM SERPL-MCNC: 9.4 MG/DL (ref 8.4–10.2)
CALCIUM SERPL-MCNC: 9.7 MG/DL (ref 8.4–10.2)
CHLORIDE SERPL-SCNC: 100 MMOL/L (ref 98–107)
CHLORIDE SERPL-SCNC: 101 MMOL/L (ref 98–107)
CK SERPL-CCNC: 27 UNITS/L (ref 26–192)
CO2 SERPL-SCNC: 22 MMOL/L (ref 21–32)
CO2 SERPL-SCNC: 23 MMOL/L (ref 21–32)
COHGB MFR BLD: 1.4 %
COHGB MFR BLD: 1.5 %
COHGB MFR BLD: 1.6 %
COHGB MFR BLD: 1.9 %
COHGB MFR BLD: 2 %
CONDITION-RC: ABNORMAL
CONDITION: ABNORMAL
CREAT SERPL-MCNC: 1.87 MG/DL (ref 0.51–0.95)
CREAT SERPL-MCNC: 1.96 MG/DL (ref 0.51–0.95)
CRP SERPL-MCNC: 12 MG/DL
D DIMER PPP FEU-MCNC: >35.2 MG/L (FEU)
D DIMER PPP FEU-MCNC: ABNORMAL
DIFFERENTIAL METHOD BLD: ABNORMAL
EOSINOPHIL # BLD: 1.4 K/MCL (ref 0.1–0.5)
EOSINOPHIL NFR BLD: 6 %
ERYTHROCYTE [DISTWIDTH] IN BLOOD: 13.7 % (ref 11–15)
FERRITIN SERPL-MCNC: 1973 NG/ML (ref 8–252)
FIBRINOGEN PPP-MCNC: 326 MG/DL (ref 190–425)
GLOBULIN SER-MCNC: 4.3 G/DL (ref 2–4)
GLUCOSE BLD-MCNC: 150 MG/DL (ref 70–99)
GLUCOSE BLDC GLUCOMTR-MCNC: 105 MG/DL (ref 70–99)
GLUCOSE BLDC GLUCOMTR-MCNC: 112 MG/DL (ref 70–99)
GLUCOSE BLDC GLUCOMTR-MCNC: 131 MG/DL (ref 70–99)
GLUCOSE BLDC GLUCOMTR-MCNC: ABNORMAL MG/DL
GLUCOSE BLDC GLUCOMTR-MCNC: ABNORMAL MG/DL
GLUCOSE SERPL-MCNC: 107 MG/DL (ref 65–99)
GLUCOSE SERPL-MCNC: 154 MG/DL (ref 65–99)
HCO3 BLDA-SCNC: 19 MMOL/L (ref 22–28)
HCO3 BLDA-SCNC: 23 MMOL/L (ref 22–28)
HCO3 BLDA-SCNC: 24 MMOL/L (ref 22–28)
HCO3 BLDA-SCNC: 25 MMOL/L (ref 22–28)
HCO3 BLDA-SCNC: 25 MMOL/L (ref 22–28)
HCT VFR BLD CALC: 28.4 % (ref 36–46.5)
HCT VFR BLD CALC: ABNORMAL %
HGB BLD-MCNC: 10.3 G/DL (ref 12–15.5)
HGB BLD-MCNC: 10.5 G/DL (ref 12–15.5)
HGB BLD-MCNC: 9.4 G/DL (ref 12–15.5)
HGB BLD-MCNC: 9.4 G/DL (ref 12–15.5)
HGB BLD-MCNC: 9.6 G/DL (ref 12–15.5)
HGB BLD-MCNC: 9.8 G/DL (ref 12–15.5)
HOROWITZ INDEX BLD+IHG-RTO: ABNORMAL MM[HG]
HYPOCHROMIA (HYPOC): ABNORMAL
INR PPP: 1.1
INR PPP: NORMAL
LACTATE BLDA-MCNC: 0.7 MMOL/L
LACTATE BLDA-MCNC: 0.7 MMOL/L
LACTATE BLDA-MCNC: 0.8 MMOL/L
LACTATE BLDA-MCNC: 0.9 MMOL/L
LACTATE BLDA-MCNC: 1.7 MMOL/L
LARGE PLATELETS (PLTL): PRESENT
LDH SERPL L TO P-CCNC: 638 UNITS/L (ref 82–240)
LYMPHOCYTES # BLD: 1.4 K/MCL (ref 1–4.8)
LYMPHOCYTES NFR BLD: 6 %
MAGNESIUM SERPL-MCNC: 2.6 MG/DL (ref 1.7–2.4)
MAGNESIUM SERPL-MCNC: 2.9 MG/DL (ref 1.7–2.4)
MCH RBC QN AUTO: 29.7 PG (ref 26–34)
MCHC RBC AUTO-ENTMCNC: 33.1 G/DL (ref 32–36.5)
MCV RBC AUTO: 89.9 FL (ref 78–100)
METAMYELOCYTES NFR BLD: 2 % (ref 0–2)
METHGB MFR BLD: 0.2 %
METHGB MFR BLD: 0.4 %
METHGB MFR BLD: 0.9 %
MONOCYTES # BLD: 1.8 K/MCL (ref 0.3–0.9)
MONOCYTES NFR BLD: 8 %
MYELOCYTES NFR BLD: 5 %
NEUTROPHILS # BLD: 16.6 K/MCL (ref 1.8–7.7)
NEUTS BAND NFR BLD: 2 % (ref 0–10)
NEUTS SEG NFR BLD: 71 %
NRBC (NRBCRE): 0 /100 WBC
NT-PROBNP SERPL-MCNC: 1646 PG/ML
OXYHGB MFR BLD: 96.3 % (ref 94–98)
OXYHGB MFR BLD: 96.9 % (ref 94–98)
OXYHGB MFR BLD: 97.1 % (ref 94–98)
OXYHGB MFR BLD: 97.6 % (ref 94–98)
OXYHGB MFR BLDA: 97.8 % (ref 94–98)
PAO2 / FIO2 RATIO (RPFR): ABNORMAL
PATH REV BLD -IMP: ABNORMAL
PCO2 BLDA: 28 MM HG (ref 32–45)
PCO2 BLDA: 36 MM HG (ref 32–45)
PCO2 BLDA: 38 MM HG (ref 32–45)
PCO2 BLDA: 38 MM HG (ref 32–45)
PCO2 BLDA: 43 MM HG (ref 32–45)
PH BLDA: 7.38 UNITS (ref 7.35–7.45)
PH BLDA: 7.4 UNITS (ref 7.35–7.45)
PH BLDA: 7.41 UNITS (ref 7.35–7.45)
PH BLDA: 7.42 UNITS (ref 7.35–7.45)
PH BLDA: 7.44 UNITS (ref 7.35–7.45)
PHOSPHATE SERPL-MCNC: 2.9 MG/DL (ref 2.4–4.7)
PHOSPHATE SERPL-MCNC: 3.2 MG/DL (ref 2.4–4.7)
PLATELET # BLD: 175 K/MCL (ref 140–450)
PO2 BLDA: 130 MM HG (ref 83–108)
PO2 BLDA: 206 MM HG (ref 83–108)
PO2 BLDA: 214 MM HG (ref 83–108)
PO2 BLDA: 398 MM HG (ref 83–108)
PO2 BLDA: 87 MM HG (ref 83–108)
POTASSIUM BLD-SCNC: 3.5 MMOL/L (ref 3.4–5.1)
POTASSIUM BLD-SCNC: 4 MMOL/L (ref 3.4–5.1)
POTASSIUM BLD-SCNC: 4.2 MMOL/L (ref 3.4–5.1)
POTASSIUM BLD-SCNC: 4.4 MMOL/L (ref 3.4–5.1)
POTASSIUM BLD-SCNC: 5 MMOL/L (ref 3.4–5.1)
POTASSIUM SERPL-SCNC: 3.9 MMOL/L (ref 3.4–5.1)
POTASSIUM SERPL-SCNC: 4.2 MMOL/L (ref 3.4–5.1)
PROCALCITONIN SERPL IA-MCNC: 2.15 NG/ML
PROCALCITONIN SERPL IA-MCNC: ABNORMAL NG/ML
PROT SERPL-MCNC: 7.7 G/DL (ref 6.4–8.2)
PROTHROMBIN TIME (PRT2): NORMAL
PROTHROMBIN TIME: 11.4 SEC (ref 9.7–11.8)
RBC # BLD: 3.16 MIL/MCL (ref 4–5.2)
SAO2 % BLDA: 100 %
SAO2 % BLDA: 100 % (ref 95–99)
SAO2 % BLDA: 99 % (ref 95–99)
SERVICE CMNT-IMP: NORMAL
SODIUM BLD-SCNC: 133 MMOL/L (ref 135–145)
SODIUM BLD-SCNC: 134 MMOL/L (ref 135–145)
SODIUM BLD-SCNC: 134 MMOL/L (ref 135–145)
SODIUM BLD-SCNC: 135 MMOL/L (ref 135–145)
SODIUM BLD-SCNC: 136 MMOL/L (ref 135–145)
SODIUM SERPL-SCNC: 133 MMOL/L (ref 135–145)
SODIUM SERPL-SCNC: 135 MMOL/L (ref 135–145)
TOXIC GRANULATION (TOXIC): PRESENT
WBC # BLD: 22.8 K/MCL (ref 4.2–11)
WBC MORPH BLD: NORMAL

## 2020-07-10 PROCEDURE — 99233 SBSQ HOSP IP/OBS HIGH 50: CPT | Performed by: INTERNAL MEDICINE

## 2020-07-10 PROCEDURE — X1094 NO CHARGE VISIT: HCPCS | Performed by: INTERNAL MEDICINE

## 2020-07-11 LAB
ALBUMIN SERPL-MCNC: 4 G/DL (ref 3.6–5.1)
ALBUMIN/GLOB SERPL: 1.1 {RATIO} (ref 1–2.4)
ALP SERPL-CCNC: 98 UNITS/L (ref 45–117)
ALT SERPL-CCNC: 88 UNITS/L
ANALYZER ANC (IANC): ABNORMAL
ANION GAP SERPL CALC-SCNC: 13 MMOL/L (ref 10–20)
ANION GAP SERPL CALC-SCNC: 13 MMOL/L (ref 10–20)
ANNOTATION COMMENT IMP: NORMAL
ANNOTATION COMMENT IMP: NORMAL
APTT PPP: 28 SEC (ref 22–32)
APTT PPP: NORMAL S
APTT PPP: NORMAL S
AST SERPL-CCNC: 55 UNITS/L
BASE DEFICIT BLDA-SCNC: 1 MMOL/L (ref 0–2)
BASE DEFICIT BLDA-SCNC: 2 MMOL/L (ref 0–2)
BASE DEFICIT BLDA-SCNC: ABNORMAL MMOL/L
BASE EXCESS BLDA CALC-SCNC: 0 MMOL/L (ref 0–3)
BASE EXCESS BLDA CALC-SCNC: ABNORMAL MMOL/L
BASOPHILS # BLD: 0 K/MCL (ref 0–0.3)
BASOPHILS NFR BLD: 0 %
BDY SITE: ABNORMAL
BILIRUB SERPL-MCNC: 1.3 MG/DL (ref 0.2–1)
BLOOD BANK CMNT PATIENT-IMP: NORMAL
BODY TEMPERATURE: 36.7 DEGREES
BODY TEMPERATURE: 36.7 DEGREES
BODY TEMPERATURE: 36.8 DEGREES
BODY TEMPERATURE: 37.2 DEGREES
BUN SERPL-MCNC: 39 MG/DL (ref 6–20)
BUN SERPL-MCNC: 40 MG/DL (ref 6–20)
BUN/CREAT SERPL: 22 (ref 7–25)
BUN/CREAT SERPL: 23 (ref 7–25)
CA-I BLD ISE-SCNC: 1.22 MMOL/L (ref 1.15–1.29)
CA-I BLD ISE-SCNC: 1.28 MMOL/L (ref 1.15–1.29)
CA-I BLD ISE-SCNC: 1.28 MMOL/L (ref 1.15–1.29)
CA-I BLD ISE-SCNC: 1.3 MMOL/L (ref 1.15–1.29)
CA-I BLD ISE-SCNC: 1.31 MMOL/L (ref 1.15–1.29)
CA-I BLDA-SCNC: 12 % (ref 15–23)
CA-I BLDA-SCNC: 13 % (ref 15–23)
CA-I BLDA-SCNC: 15 ML/DL (ref 15–23)
CALCIUM SERPL-MCNC: 10.1 MG/DL (ref 8.4–10.2)
CALCIUM SERPL-MCNC: 9.7 MG/DL (ref 8.4–10.2)
CHLORIDE SERPL-SCNC: 101 MMOL/L (ref 98–107)
CHLORIDE SERPL-SCNC: 102 MMOL/L (ref 98–107)
CK SERPL-CCNC: 26 UNITS/L (ref 26–192)
CO2 SERPL-SCNC: 24 MMOL/L (ref 21–32)
CO2 SERPL-SCNC: 25 MMOL/L (ref 21–32)
COHGB MFR BLD: 1 %
COHGB MFR BLD: 1.2 %
COHGB MFR BLD: 1.3 %
COHGB MFR BLD: 1.8 %
COHGB MFR BLD: 1.9 %
CONDITION, POC: ABNORMAL
CONDITION-RC: ABNORMAL
CONDITION: ABNORMAL
CREAT SERPL-MCNC: 1.73 MG/DL (ref 0.51–0.95)
CREAT SERPL-MCNC: 1.85 MG/DL (ref 0.51–0.95)
CRP SERPL-MCNC: 9.5 MG/DL
D DIMER PPP FEU-MCNC: >35.2 MG/L (FEU)
D DIMER PPP FEU-MCNC: ABNORMAL
DIFFERENTIAL METHOD BLD: ABNORMAL
EOSINOPHIL # BLD: 0.7 K/MCL (ref 0.1–0.5)
EOSINOPHIL NFR BLD: 3 %
ERYTHROCYTE [DISTWIDTH] IN BLOOD: 13.8 % (ref 11–15)
FERRITIN SERPL-MCNC: 2127 NG/ML (ref 8–252)
FIBRINOGEN PPP-MCNC: 263 MG/DL (ref 190–425)
GLOBULIN SER-MCNC: 3.8 G/DL (ref 2–4)
GLUCOSE BLD-MCNC: 174 MG/DL (ref 70–99)
GLUCOSE BLDC GLUCOMTR-MCNC: 124 MG/DL (ref 70–99)
GLUCOSE BLDC GLUCOMTR-MCNC: 137 MG/DL (ref 70–99)
GLUCOSE BLDC GLUCOMTR-MCNC: 145 MG/DL (ref 70–99)
GLUCOSE BLDC GLUCOMTR-MCNC: 151 MG/DL (ref 70–99)
GLUCOSE BLDC GLUCOMTR-MCNC: 153 MG/DL (ref 70–99)
GLUCOSE BLDC GLUCOMTR-MCNC: ABNORMAL MG/DL
GLUCOSE BLDC GLUCOMTR-MCNC: ABNORMAL MG/DL
GLUCOSE SERPL-MCNC: 119 MG/DL (ref 65–99)
GLUCOSE SERPL-MCNC: 164 MG/DL (ref 65–99)
HAV IGM SER QL: NEGATIVE
HBV CORE IGM SER QL: NEGATIVE
HBV SURFACE AG SER QL: NEGATIVE
HCO3 BLDA-SCNC: 21 MMOL/L (ref 22–28)
HCO3 BLDA-SCNC: 23 MMOL/L (ref 22–28)
HCO3 BLDA-SCNC: 24 MMOL/L (ref 22–28)
HCO3 BLDA-SCNC: 24 MMOL/L (ref 22–28)
HCO3 BLDA-SCNC: 25 MMOL/L (ref 22–28)
HCT VFR BLD CALC: 24.3 % (ref 36–46.5)
HCT VFR BLD CALC: 26.5 % (ref 36–46.5)
HCT VFR BLD CALC: ABNORMAL %
HCV AB SER QL: NEGATIVE
HGB BLD-MCNC: 10.2 G/DL (ref 12–15.5)
HGB BLD-MCNC: 8 G/DL (ref 12–15.5)
HGB BLD-MCNC: 8.6 G/DL (ref 12–15.5)
HGB BLD-MCNC: 8.7 G/DL (ref 12–15.5)
HGB BLD-MCNC: 9.2 G/DL (ref 12–15.5)
HGB BLD-MCNC: 9.4 G/DL (ref 12–15.5)
HGB BLD-MCNC: 9.6 G/DL (ref 12–15.5)
HOROWITZ INDEX BLD+IHG-RTO: ABNORMAL MM[HG]
HYPOCHROMIA (HYPOC): ABNORMAL
INR PPP: 1.1
INR PPP: NORMAL
LACTATE BLDA-MCNC: 0.5 MMOL/L
LACTATE BLDA-MCNC: 0.7 MMOL/L
LACTATE BLDA-MCNC: 0.8 MMOL/L
LACTATE BLDA-MCNC: 0.8 MMOL/L
LACTATE BLDA-MCNC: 0.9 MMOL/L
LARGE PLATELETS (PLTL): PRESENT
LDH SERPL L TO P-CCNC: 563 UNITS/L (ref 82–240)
LYMPHOCYTES # BLD: 0.9 K/MCL (ref 1–4.8)
LYMPHOCYTES NFR BLD: 2 %
MAGNESIUM SERPL-MCNC: 2.7 MG/DL (ref 1.7–2.4)
MAGNESIUM SERPL-MCNC: 2.8 MG/DL (ref 1.7–2.4)
MCH RBC QN AUTO: 28.9 PG (ref 26–34)
MCHC RBC AUTO-ENTMCNC: 32.9 G/DL (ref 32–36.5)
MCV RBC AUTO: 87.7 FL (ref 78–100)
METAMYELOCYTES NFR BLD: 3 % (ref 0–2)
METHGB MFR BLD: 0.6 %
METHGB MFR BLD: 0.8 %
METHGB MFR BLD: 1.2 %
MONOCYTES # BLD: 0.9 K/MCL (ref 0.3–0.9)
MONOCYTES NFR BLD: 4 %
MYELOCYTES NFR BLD: 3 %
NEUTROPHILS # BLD: 18.3 K/MCL (ref 1.8–7.7)
NEUTS BAND NFR BLD: 1 % (ref 0–10)
NEUTS SEG NFR BLD: 82 %
NRBC (NRBCRE): 0 /100 WBC
NT-PROBNP SERPL-MCNC: 2575 PG/ML
NUM BPU REQUESTED: 1
OXYHGB MFR BLD: 94.9 % (ref 94–98)
OXYHGB MFR BLD: 96.5 % (ref 94–98)
OXYHGB MFR BLD: 96.9 % (ref 94–98)
OXYHGB MFR BLD: 97.5 % (ref 94–98)
OXYHGB MFR BLDA: 97.5 % (ref 94–98)
PAO2 / FIO2 RATIO (RPFR): ABNORMAL
PATH REV BLD -IMP: ABNORMAL
PCO2 BLDA: 28 MM HG (ref 32–45)
PCO2 BLDA: 34 MM HG (ref 32–45)
PCO2 BLDA: 37 MM HG (ref 32–45)
PCO2 BLDA: 39 MM HG (ref 32–45)
PCO2 BLDA: 40 MM HG (ref 32–45)
PH BLDA: 7.39 UNITS (ref 7.35–7.45)
PH BLDA: 7.39 UNITS (ref 7.35–7.45)
PH BLDA: 7.43 UNITS (ref 7.35–7.45)
PH BLDA: 7.44 UNITS (ref 7.35–7.45)
PH BLDA: 7.48 UNITS (ref 7.35–7.45)
PHOSPHATE SERPL-MCNC: 2.4 MG/DL (ref 2.4–4.7)
PHOSPHATE SERPL-MCNC: 2.8 MG/DL (ref 2.4–4.7)
PLATELET # BLD: 199 K/MCL (ref 140–450)
PO2 BLDA: 104 MM HG (ref 83–108)
PO2 BLDA: 152 MM HG (ref 83–108)
PO2 BLDA: 226 MM HG (ref 83–108)
PO2 BLDA: 414 MM HG (ref 83–108)
PO2 BLDA: 71 MM HG (ref 83–108)
POTASSIUM BLD-SCNC: 3.4 MMOL/L (ref 3.4–5.1)
POTASSIUM BLD-SCNC: 3.6 MMOL/L (ref 3.4–5.1)
POTASSIUM BLD-SCNC: 4.5 MMOL/L (ref 3.4–5.1)
POTASSIUM BLD-SCNC: 4.5 MMOL/L (ref 3.4–5.1)
POTASSIUM BLD-SCNC: 4.6 MMOL/L (ref 3.4–5.1)
POTASSIUM SERPL-SCNC: 3.7 MMOL/L (ref 3.4–5.1)
POTASSIUM SERPL-SCNC: 4.1 MMOL/L (ref 3.4–5.1)
PROCALCITONIN SERPL IA-MCNC: 1.86 NG/ML
PROCALCITONIN SERPL IA-MCNC: ABNORMAL NG/ML
PROT SERPL-MCNC: 7.8 G/DL (ref 6.4–8.2)
PROTHROMBIN TIME (PRT2): NORMAL
PROTHROMBIN TIME: 11.3 SEC (ref 9.7–11.8)
RBC # BLD: 2.77 MIL/MCL (ref 4–5.2)
SAO2 % BLDA: 100 %
SAO2 % BLDA: 97 % (ref 95–99)
SAO2 % BLDA: 99 % (ref 95–99)
SERVICE CMNT-IMP: NORMAL
SODIUM BLD-SCNC: 136 MMOL/L (ref 135–145)
SODIUM BLD-SCNC: 136 MMOL/L (ref 135–145)
SODIUM BLD-SCNC: 137 MMOL/L (ref 135–145)
SODIUM BLD-SCNC: 138 MMOL/L (ref 135–145)
SODIUM BLD-SCNC: 138 MMOL/L (ref 135–145)
SODIUM SERPL-SCNC: 134 MMOL/L (ref 135–145)
SODIUM SERPL-SCNC: 136 MMOL/L (ref 135–145)
TOXIC GRANULATION (TOXIC): PRESENT
VARIANT LYMPHS NFR BLD: 2 % (ref 0–5)
WBC # BLD: 22 K/MCL (ref 4.2–11)

## 2020-07-11 PROCEDURE — 99233 SBSQ HOSP IP/OBS HIGH 50: CPT | Performed by: INTERNAL MEDICINE

## 2020-07-12 LAB
25(OH)D3+25(OH)D2 SERPL-MCNC: 15.9 NG/ML (ref 30–100)
25(OH)D3+25(OH)D2 SERPL-MCNC: ABNORMAL NG/ML
ABO + RH BLD: NORMAL
ALBUMIN SERPL-MCNC: 4.8 G/DL (ref 3.6–5.1)
ALBUMIN/GLOB SERPL: 1.4 {RATIO} (ref 1–2.4)
ALP SERPL-CCNC: 84 UNITS/L (ref 45–117)
ALT SERPL-CCNC: 84 UNITS/L
ANALYZER ANC (IANC): ABNORMAL
ANION GAP SERPL CALC-SCNC: 10 MMOL/L (ref 10–20)
ANION GAP SERPL CALC-SCNC: 13 MMOL/L (ref 10–20)
APTT PPP: 28 SEC (ref 22–32)
APTT PPP: NORMAL S
APTT PPP: NORMAL S
AST SERPL-CCNC: 52 UNITS/L
BASE DEFICIT BLDA-SCNC: 1 MMOL/L (ref 0–2)
BASE DEFICIT BLDA-SCNC: 2 MMOL/L (ref 0–2)
BASE DEFICIT BLDA-SCNC: ABNORMAL MMOL/L
BASE EXCESS BLDA CALC-SCNC: 0 MMOL/L (ref 0–3)
BASE EXCESS BLDA CALC-SCNC: 3 MMOL/L (ref 0–3)
BASE EXCESS BLDA CALC-SCNC: 3 MMOL/L (ref 0–3)
BASE EXCESS BLDA CALC-SCNC: ABNORMAL MMOL/L
BASE EXCESS BLDA CALC-SCNC: ABNORMAL MMOL/L
BASOPHILS # BLD: 0.2 K/MCL (ref 0–0.3)
BASOPHILS NFR BLD: 1 %
BDY SITE: ABNORMAL
BILIRUB SERPL-MCNC: 1.1 MG/DL (ref 0.2–1)
BLD GP AB SCN SERPL QL: NEGATIVE
BLD PROD TYP BPU: NORMAL
BLD UNIT ID BPU: NORMAL
BLOOD BANK CMNT PATIENT-IMP: NORMAL
BODY TEMPERATURE: 36.6 DEGREES
BODY TEMPERATURE: 36.7 DEGREES
BODY TEMPERATURE: 37 DEGREES
BODY TEMPERATURE: 37.2 DEGREES
BUN SERPL-MCNC: 44 MG/DL (ref 6–20)
BUN SERPL-MCNC: 46 MG/DL (ref 6–20)
BUN/CREAT SERPL: 25 (ref 7–25)
BUN/CREAT SERPL: 27 (ref 7–25)
BURR CELLS (BURC): ABNORMAL
CA-I BLD ISE-SCNC: 1.25 MMOL/L (ref 1.15–1.29)
CA-I BLD ISE-SCNC: 1.26 MMOL/L (ref 1.15–1.29)
CA-I BLD ISE-SCNC: 1.29 MMOL/L (ref 1.15–1.29)
CA-I BLD ISE-SCNC: 1.3 MMOL/L (ref 1.15–1.29)
CA-I BLD ISE-SCNC: 1.3 MMOL/L (ref 1.15–1.29)
CA-I BLDA-SCNC: 12 % (ref 15–23)
CA-I BLDA-SCNC: 14 % (ref 15–23)
CA-I BLDA-SCNC: 15 % (ref 15–23)
CA-I BLDA-SCNC: 15 % (ref 15–23)
CA-I BLDA-SCNC: 15 ML/DL (ref 15–23)
CALCIUM SERPL-MCNC: 10.1 MG/DL (ref 8.4–10.2)
CALCIUM SERPL-MCNC: 9.9 MG/DL (ref 8.4–10.2)
CHLORIDE SERPL-SCNC: 101 MMOL/L (ref 98–107)
CHLORIDE SERPL-SCNC: 104 MMOL/L (ref 98–107)
CK SERPL-CCNC: 18 UNITS/L (ref 26–192)
CO2 SERPL-SCNC: 25 MMOL/L (ref 21–32)
CO2 SERPL-SCNC: 26 MMOL/L (ref 21–32)
COHGB MFR BLD: 0.9 %
COHGB MFR BLD: 1.1 %
COHGB MFR BLD: 1.3 %
COHGB MFR BLD: 1.6 %
COHGB MFR BLD: 1.9 %
CONDITION, POC: ABNORMAL
CONDITION-RC: ABNORMAL
CONDITION: ABNORMAL
CREAT SERPL-MCNC: 1.68 MG/DL (ref 0.51–0.95)
CREAT SERPL-MCNC: 1.78 MG/DL (ref 0.51–0.95)
CROSSMATCH EXPIRE: NORMAL
CRP SERPL-MCNC: 4.9 MG/DL
D DIMER PPP FEU-MCNC: >35.2 MG/L (FEU)
D DIMER PPP FEU-MCNC: ABNORMAL
DIFFERENTIAL METHOD BLD: ABNORMAL
EOSINOPHIL # BLD: 1 K/MCL (ref 0.1–0.5)
EOSINOPHIL NFR BLD: 6 %
ERYTHROCYTE [DISTWIDTH] IN BLOOD: 14.5 % (ref 11–15)
FERRITIN SERPL-MCNC: 1962 NG/ML (ref 8–252)
FIBRINOGEN PPP-MCNC: 208 MG/DL (ref 190–425)
FOLATE SERPL-MCNC: 7.3 NG/ML
GLOBULIN SER-MCNC: 3.4 G/DL (ref 2–4)
GLUCOSE BLD-MCNC: 149 MG/DL (ref 70–99)
GLUCOSE BLDC GLUCOMTR-MCNC: 133 MG/DL (ref 70–99)
GLUCOSE BLDC GLUCOMTR-MCNC: 143 MG/DL (ref 70–99)
GLUCOSE BLDC GLUCOMTR-MCNC: 144 MG/DL (ref 70–99)
GLUCOSE BLDC GLUCOMTR-MCNC: ABNORMAL MG/DL
GLUCOSE SERPL-MCNC: 119 MG/DL (ref 65–99)
GLUCOSE SERPL-MCNC: 147 MG/DL (ref 65–99)
HCO3 BLDA-SCNC: 21 MMOL/L (ref 22–28)
HCO3 BLDA-SCNC: 24 MMOL/L (ref 22–28)
HCO3 BLDA-SCNC: 25 MMOL/L (ref 22–28)
HCO3 BLDA-SCNC: 26 MMOL/L (ref 22–28)
HCO3 BLDA-SCNC: 27 MMOL/L (ref 22–28)
HCT VFR BLD CALC: 25.3 % (ref 36–46.5)
HCT VFR BLD CALC: 29.1 % (ref 36–46.5)
HCT VFR BLD CALC: ABNORMAL %
HGB BLD-MCNC: 10.2 G/DL (ref 12–15.5)
HGB BLD-MCNC: 10.3 G/DL (ref 12–15.5)
HGB BLD-MCNC: 10.8 G/DL (ref 12–15.5)
HGB BLD-MCNC: 8.2 G/DL (ref 12–15.5)
HGB BLD-MCNC: 8.5 G/DL (ref 12–15.5)
HGB BLD-MCNC: 9.6 G/DL (ref 12–15.5)
HGB BLD-MCNC: 9.8 G/DL (ref 12–15.5)
HOROWITZ INDEX BLD+IHG-RTO: ABNORMAL MM[HG]
HYPOCHROMIA (HYPOC): ABNORMAL
INR PPP: 1.1
INR PPP: NORMAL
IRON SATN MFR SERPL: 46 % (ref 15–45)
IRON SERPL-MCNC: 88 MCG/DL (ref 50–170)
LACTATE BLDA-MCNC: 0.9 MMOL/L
LACTATE BLDA-MCNC: 1.2 MMOL/L
LACTATE BLDA-MCNC: 1.7 MMOL/L
LARGE PLATELETS (PLTL): PRESENT
LDH SERPL L TO P-CCNC: 485 UNITS/L (ref 82–240)
LYMPHOCYTES # BLD: 0.9 K/MCL (ref 1–4.8)
LYMPHOCYTES NFR BLD: 5 %
MAGNESIUM SERPL-MCNC: 2.7 MG/DL (ref 1.7–2.4)
MAGNESIUM SERPL-MCNC: 2.7 MG/DL (ref 1.7–2.4)
MAJ XM SERPL-IMP: NORMAL
MCH RBC QN AUTO: 29 PG (ref 26–34)
MCHC RBC AUTO-ENTMCNC: 32.4 G/DL (ref 32–36.5)
MCV RBC AUTO: 89.4 FL (ref 78–100)
METAMYELOCYTES NFR BLD: 1 % (ref 0–2)
METHGB MFR BLD: 0.2 %
METHGB MFR BLD: 0.8 %
METHGB MFR BLD: 1.2 %
MICROCYTOSIS (MICY): ABNORMAL
MONOCYTES # BLD: 1.2 K/MCL (ref 0.3–0.9)
MONOCYTES NFR BLD: 7 %
MYELOCYTES NFR BLD: 4 %
NEUTROPHILS # BLD: 13.2 K/MCL (ref 1.8–7.7)
NEUTS BAND NFR BLD: 3 % (ref 0–10)
NEUTS SEG NFR BLD: 73 %
NRBC (NRBCRE): 0 /100 WBC
NT-PROBNP SERPL-MCNC: 3982 PG/ML
NUM BPU REQUESTED: 1
NUM BPU REQUESTED: 1
OVALOCYTES (OVALO): ABNORMAL
OXYHGB MFR BLD: 95.1 % (ref 94–98)
OXYHGB MFR BLD: 96.2 % (ref 94–98)
OXYHGB MFR BLD: 96.5 % (ref 94–98)
OXYHGB MFR BLD: 97.2 % (ref 94–98)
OXYHGB MFR BLDA: 97.9 % (ref 94–98)
PAO2 / FIO2 RATIO (RPFR): ABNORMAL
PATH REV BLD -IMP: ABNORMAL
PCO2 BLDA: 29 MM HG (ref 32–45)
PCO2 BLDA: 36 MM HG (ref 32–45)
PCO2 BLDA: 37 MM HG (ref 32–45)
PCO2 BLDA: 38 MM HG (ref 32–45)
PCO2 BLDA: 38 MM HG (ref 32–45)
PH BLDA: 7.4 UNITS (ref 7.35–7.45)
PH BLDA: 7.42 UNITS (ref 7.35–7.45)
PH BLDA: 7.47 UNITS (ref 7.35–7.45)
PHOSPHATE SERPL-MCNC: 1.9 MG/DL (ref 2.4–4.7)
PHOSPHATE SERPL-MCNC: 2.8 MG/DL (ref 2.4–4.7)
PLATELET # BLD: 177 K/MCL (ref 140–450)
PO2 BLDA: 131 MM HG (ref 83–108)
PO2 BLDA: 174 MM HG (ref 83–108)
PO2 BLDA: 402 MM HG (ref 83–108)
PO2 BLDA: 71 MM HG (ref 83–108)
PO2 BLDA: 90 MM HG (ref 83–108)
POTASSIUM BLD-SCNC: 4 MMOL/L (ref 3.4–5.1)
POTASSIUM BLD-SCNC: 4 MMOL/L (ref 3.4–5.1)
POTASSIUM BLD-SCNC: 4.8 MMOL/L (ref 3.4–5.1)
POTASSIUM BLD-SCNC: 4.9 MMOL/L (ref 3.4–5.1)
POTASSIUM BLD-SCNC: 5.1 MMOL/L (ref 3.4–5.1)
POTASSIUM SERPL-SCNC: 3.7 MMOL/L (ref 3.4–5.1)
POTASSIUM SERPL-SCNC: 4.5 MMOL/L (ref 3.4–5.1)
PROT SERPL-MCNC: 8.2 G/DL (ref 6.4–8.2)
PROTHROMBIN TIME (PRT2): NORMAL
PROTHROMBIN TIME: 11.3 SEC (ref 9.7–11.8)
RBC # BLD: 2.83 MIL/MCL (ref 4–5.2)
SAO2 % BLDA: 100 %
SAO2 % BLDA: 97 % (ref 95–99)
SAO2 % BLDA: 99 % (ref 95–99)
SERVICE CMNT-IMP: NORMAL
SODIUM BLD-SCNC: 135 MMOL/L (ref 135–145)
SODIUM BLD-SCNC: 136 MMOL/L (ref 135–145)
SODIUM BLD-SCNC: 137 MMOL/L (ref 135–145)
SODIUM BLD-SCNC: 137 MMOL/L (ref 135–145)
SODIUM BLD-SCNC: 139 MMOL/L (ref 135–145)
SODIUM SERPL-SCNC: 133 MMOL/L (ref 135–145)
SODIUM SERPL-SCNC: 138 MMOL/L (ref 135–145)
STATUS OF UNIT: NORMAL
TIBC SERPL-MCNC: 190 MCG/DL (ref 250–450)
TOXIC GRANULATION (TOXIC): PRESENT
TRANSFUSION STATUS: NORMAL
UNIT DIVISION: 0
VIT B12 SERPL-MCNC: 507 PG/ML (ref 211–911)
WBC # BLD: 17.4 K/MCL (ref 4.2–11)

## 2020-07-12 PROCEDURE — 99233 SBSQ HOSP IP/OBS HIGH 50: CPT | Performed by: INTERNAL MEDICINE

## 2020-07-13 LAB
ABO + RH BLD: NORMAL
ALBUMIN SERPL-MCNC: 5 G/DL (ref 3.6–5.1)
ALBUMIN/GLOB SERPL: 1.4 {RATIO} (ref 1–2.4)
ALP SERPL-CCNC: 104 UNITS/L (ref 45–117)
ALT SERPL-CCNC: 97 UNITS/L
ANALYZER ANC (IANC): ABNORMAL
ANION GAP SERPL CALC-SCNC: 14 MMOL/L (ref 10–20)
APTT PPP: 27 SEC (ref 22–32)
APTT PPP: NORMAL S
APTT PPP: NORMAL S
AST SERPL-CCNC: 63 UNITS/L
BASE DEFICIT BLDA-SCNC: 3 MMOL/L (ref 0–2)
BASE DEFICIT BLDA-SCNC: ABNORMAL MMOL/L
BASE EXCESS BLDA CALC-SCNC: 0 MMOL/L (ref 0–3)
BASE EXCESS BLDA CALC-SCNC: 1 MMOL/L (ref 0–3)
BASE EXCESS BLDA CALC-SCNC: 3 MMOL/L (ref 0–3)
BASE EXCESS BLDA CALC-SCNC: ABNORMAL MMOL/L
BASOPHILS # BLD: 0 K/MCL (ref 0–0.3)
BASOPHILS NFR BLD: 0 %
BDY SITE: ABNORMAL
BILIRUB SERPL-MCNC: 1.1 MG/DL (ref 0.2–1)
BLD GP AB SCN SERPL QL: NEGATIVE
BLD PROD TYP BPU: NORMAL
BLD UNIT ID BPU: NORMAL
BODY TEMPERATURE: 36.8 DEGREES
BODY TEMPERATURE: 37 DEGREES
BODY TEMPERATURE: 37 DEGREES
BUN SERPL-MCNC: 48 MG/DL (ref 6–20)
BUN/CREAT SERPL: 30 (ref 7–25)
CA-I BLD ISE-SCNC: 1.26 MMOL/L (ref 1.15–1.29)
CA-I BLD ISE-SCNC: 1.29 MMOL/L (ref 1.15–1.29)
CA-I BLD ISE-SCNC: 1.31 MMOL/L (ref 1.15–1.29)
CA-I BLD ISE-SCNC: 1.33 MMOL/L (ref 1.15–1.29)
CA-I BLDA-SCNC: 15 % (ref 15–23)
CA-I BLDA-SCNC: 16 ML/DL (ref 15–23)
CALCIUM SERPL-MCNC: 10.1 MG/DL (ref 8.4–10.2)
CHLORIDE SERPL-SCNC: 100 MMOL/L (ref 98–107)
CK SERPL-CCNC: 27 UNITS/L (ref 26–192)
CO2 SERPL-SCNC: 25 MMOL/L (ref 21–32)
COHGB MFR BLD: 0.9 %
COHGB MFR BLD: 1.4 %
COHGB MFR BLD: 1.4 %
COHGB MFR BLD: 1.8 %
CONDITION, POC: ABNORMAL
CONDITION-RC: ABNORMAL
CONDITION: ABNORMAL
CREAT SERPL-MCNC: 1.62 MG/DL (ref 0.51–0.95)
CROSSMATCH EXPIRE: NORMAL
CRP SERPL-MCNC: 3.6 MG/DL
D DIMER PPP FEU-MCNC: >35.2 MG/L (FEU)
D DIMER PPP FEU-MCNC: ABNORMAL
DIFFERENTIAL METHOD BLD: ABNORMAL
EOSINOPHIL # BLD: 0.6 K/MCL (ref 0.1–0.5)
EOSINOPHIL NFR BLD: 3 %
ERYTHROCYTE [DISTWIDTH] IN BLOOD: 14.8 % (ref 11–15)
FERRITIN SERPL-MCNC: 2192 NG/ML (ref 8–252)
FIBRINOGEN PPP-MCNC: 222 MG/DL (ref 190–425)
GLOBULIN SER-MCNC: 3.7 G/DL (ref 2–4)
GLUCOSE BLD-MCNC: 158 MG/DL (ref 70–99)
GLUCOSE BLDC GLUCOMTR-MCNC: 130 MG/DL (ref 70–99)
GLUCOSE BLDC GLUCOMTR-MCNC: 135 MG/DL (ref 70–99)
GLUCOSE BLDC GLUCOMTR-MCNC: 156 MG/DL (ref 70–99)
GLUCOSE BLDC GLUCOMTR-MCNC: ABNORMAL MG/DL
GLUCOSE SERPL-MCNC: 110 MG/DL (ref 65–99)
HCO3 BLDA-SCNC: 21 MMOL/L (ref 22–28)
HCO3 BLDA-SCNC: 24 MMOL/L (ref 22–28)
HCO3 BLDA-SCNC: 25 MMOL/L (ref 22–28)
HCO3 BLDA-SCNC: 26 MMOL/L (ref 22–28)
HCT VFR BLD CALC: 31.6 % (ref 36–46.5)
HCT VFR BLD CALC: ABNORMAL %
HGB BLD-MCNC: 10.5 G/DL (ref 12–15.5)
HGB BLD-MCNC: 10.6 G/DL (ref 12–15.5)
HGB BLD-MCNC: 10.7 G/DL (ref 12–15.5)
HGB BLD-MCNC: 10.9 G/DL (ref 12–15.5)
HGB BLD-MCNC: 10.9 G/DL (ref 12–15.5)
HOROWITZ INDEX BLD+IHG-RTO: ABNORMAL MM[HG]
INR PPP: 1.1
INR PPP: NORMAL
LACTATE BLDA-MCNC: 1 MMOL/L
LACTATE BLDA-MCNC: 1.1 MMOL/L
LACTATE BLDA-MCNC: 1.2 MMOL/L
LACTATE BLDA-MCNC: 1.3 MMOL/L
LARGE PLATELETS (PLTL): PRESENT
LDH SERPL L TO P-CCNC: 664 UNITS/L (ref 82–240)
LYMPHOCYTES # BLD: 0.8 K/MCL (ref 1–4.8)
LYMPHOCYTES NFR BLD: 4 %
MAGNESIUM SERPL-MCNC: 2.6 MG/DL (ref 1.7–2.4)
MAJ XM SERPL-IMP: NORMAL
MCH RBC QN AUTO: 29.5 PG (ref 26–34)
MCHC RBC AUTO-ENTMCNC: 33.2 G/DL (ref 32–36.5)
MCV RBC AUTO: 88.8 FL (ref 78–100)
METAMYELOCYTES NFR BLD: 2 % (ref 0–2)
METHGB MFR BLD: 0.6 %
METHGB MFR BLD: 0.6 %
METHGB MFR BLD: 0.8 %
METHGB MFR BLD: 0.9 %
MONOCYTES # BLD: 0.8 K/MCL (ref 0.3–0.9)
MONOCYTES NFR BLD: 4 %
NEUTROPHILS # BLD: 17.2 K/MCL (ref 1.8–7.7)
NEUTS BAND NFR BLD: 1 % (ref 0–10)
NEUTS SEG NFR BLD: 86 %
NRBC (NRBCRE): 0 /100 WBC
NT-PROBNP SERPL-MCNC: 3191 PG/ML
NUM BPU REQUESTED: 1
OXYHGB MFR BLD: 96.5 % (ref 94–98)
OXYHGB MFR BLD: 96.9 % (ref 94–98)
OXYHGB MFR BLD: 97.3 % (ref 94–98)
OXYHGB MFR BLDA: 97.6 % (ref 94–98)
PAO2 / FIO2 RATIO (RPFR): ABNORMAL
PATH REV BLD -IMP: ABNORMAL
PCO2 BLDA: 32 MM HG (ref 32–45)
PCO2 BLDA: 34 MM HG (ref 32–45)
PCO2 BLDA: 36 MM HG (ref 32–45)
PCO2 BLDA: 39 MM HG (ref 32–45)
PH BLDA: 7.41 UNITS (ref 7.35–7.45)
PH BLDA: 7.43 UNITS (ref 7.35–7.45)
PH BLDA: 7.46 UNITS (ref 7.35–7.45)
PH BLDA: 7.47 UNITS (ref 7.35–7.45)
PHOSPHATE SERPL-MCNC: 1.7 MG/DL (ref 2.4–4.7)
PLATELET # BLD: 213 K/MCL (ref 140–450)
PO2 BLDA: 130 MM HG (ref 83–108)
PO2 BLDA: 149 MM HG (ref 83–108)
PO2 BLDA: 295 MM HG (ref 83–108)
PO2 BLDA: 97 MM HG (ref 83–108)
POLYCHROMASIA (POLY): ABNORMAL
POTASSIUM BLD-SCNC: 4.3 MMOL/L (ref 3.4–5.1)
POTASSIUM BLD-SCNC: 4.4 MMOL/L (ref 3.4–5.1)
POTASSIUM BLD-SCNC: 4.4 MMOL/L (ref 3.4–5.1)
POTASSIUM BLD-SCNC: 4.6 MMOL/L (ref 3.4–5.1)
POTASSIUM SERPL-SCNC: 4.1 MMOL/L (ref 3.4–5.1)
PROT SERPL-MCNC: 8.7 G/DL (ref 6.4–8.2)
PROTHROMBIN TIME (PRT2): NORMAL
PROTHROMBIN TIME: 11 SEC (ref 9.7–11.8)
RBC # BLD: 3.56 MIL/MCL (ref 4–5.2)
SAO2 % BLDA: 100 %
SAO2 % BLDA: 99 % (ref 95–99)
SERVICE CMNT-IMP: NORMAL
SODIUM BLD-SCNC: 132 MMOL/L (ref 135–145)
SODIUM BLD-SCNC: 134 MMOL/L (ref 135–145)
SODIUM BLD-SCNC: 136 MMOL/L (ref 135–145)
SODIUM BLD-SCNC: 137 MMOL/L (ref 135–145)
SODIUM SERPL-SCNC: 135 MMOL/L (ref 135–145)
STATUS OF UNIT: NORMAL
TOXIC GRANULATION (TOXIC): PRESENT
TRANSFERRIN SERPL-MCNC: 122 MG/DL (ref 202–336)
TRANSFUSION STATUS: NORMAL
UNIT DIVISION: 0
WBC # BLD: 19.8 K/MCL (ref 4.2–11)

## 2020-07-13 PROCEDURE — 99233 SBSQ HOSP IP/OBS HIGH 50: CPT | Performed by: INTERNAL MEDICINE

## 2020-07-14 LAB
ALBUMIN SERPL-MCNC: 4.7 G/DL (ref 3.6–5.1)
ALBUMIN SERPL-MCNC: 5 G/DL (ref 3.6–5.1)
ALBUMIN/GLOB SERPL: 1.1 {RATIO} (ref 1–2.4)
ALBUMIN/GLOB SERPL: 1.2 {RATIO} (ref 1–2.4)
ALP SERPL-CCNC: 102 UNITS/L (ref 45–117)
ALP SERPL-CCNC: 113 UNITS/L (ref 45–117)
ALT SERPL-CCNC: 108 UNITS/L
ALT SERPL-CCNC: 134 UNITS/L
ANALYZER ANC (IANC): ABNORMAL
ANALYZER ANC (IANC): ABNORMAL
ANION GAP SERPL CALC-SCNC: 13 MMOL/L (ref 10–20)
ANION GAP SERPL CALC-SCNC: 14 MMOL/L (ref 10–20)
APTT PPP: 28 SEC (ref 22–32)
APTT PPP: NORMAL S
APTT PPP: NORMAL S
AST SERPL-CCNC: 67 UNITS/L
AST SERPL-CCNC: 91 UNITS/L
BASE DEFICIT BLDA-SCNC: ABNORMAL MMOL/L
BASE EXCESS BLDA CALC-SCNC: 2 MMOL/L (ref 0–3)
BASE EXCESS BLDA CALC-SCNC: 3 MMOL/L (ref 0–3)
BASE EXCESS BLDA CALC-SCNC: 4 MMOL/L (ref 0–3)
BASOPHILS # BLD: 0 K/MCL (ref 0–0.3)
BASOPHILS # BLD: 0 K/MCL (ref 0–0.3)
BASOPHILS NFR BLD: 0 %
BASOPHILS NFR BLD: 0 %
BDY SITE: ABNORMAL
BILIRUB SERPL-MCNC: 1 MG/DL (ref 0.2–1)
BILIRUB SERPL-MCNC: 1.4 MG/DL (ref 0.2–1)
BODY TEMPERATURE: 36.7 DEGREES
BODY TEMPERATURE: 36.8 DEGREES
BODY TEMPERATURE: 36.8 DEGREES
BODY TEMPERATURE: 36.9 DEGREES
BODY TEMPERATURE: 36.9 DEGREES
BUN SERPL-MCNC: 24 MG/DL (ref 6–20)
BUN SERPL-MCNC: 53 MG/DL (ref 6–20)
BUN/CREAT SERPL: 22 (ref 7–25)
BUN/CREAT SERPL: 28 (ref 7–25)
CA-I BLD ISE-SCNC: 1.23 MMOL/L (ref 1.15–1.29)
CA-I BLD ISE-SCNC: 1.24 MMOL/L (ref 1.15–1.29)
CA-I BLD ISE-SCNC: 1.28 MMOL/L (ref 1.15–1.29)
CA-I BLD ISE-SCNC: 1.29 MMOL/L (ref 1.15–1.29)
CA-I BLD ISE-SCNC: 1.3 MMOL/L (ref 1.15–1.29)
CA-I BLD ISE-SCNC: 1.31 MMOL/L (ref 1.15–1.29)
CA-I BLDA-SCNC: 15 % (ref 15–23)
CA-I BLDA-SCNC: 16 ML/DL (ref 15–23)
CALCIUM SERPL-MCNC: 10.4 MG/DL (ref 8.4–10.2)
CALCIUM SERPL-MCNC: 10.4 MG/DL (ref 8.4–10.2)
CHLORIDE SERPL-SCNC: 101 MMOL/L (ref 98–107)
CHLORIDE SERPL-SCNC: 99 MMOL/L (ref 98–107)
CK SERPL-CCNC: 28 UNITS/L (ref 26–192)
CO2 SERPL-SCNC: 26 MMOL/L (ref 21–32)
CO2 SERPL-SCNC: 27 MMOL/L (ref 21–32)
COHGB MFR BLD: 1 %
COHGB MFR BLD: 1.1 %
COHGB MFR BLD: 1.2 %
COHGB MFR BLD: 1.2 %
COHGB MFR BLD: 1.3 %
COHGB MFR BLD: 1.7 %
CONDITION, POC: ABNORMAL
CONDITION-RC: ABNORMAL
CONDITION: ABNORMAL
CREAT SERPL-MCNC: 1.11 MG/DL (ref 0.51–0.95)
CREAT SERPL-MCNC: 1.86 MG/DL (ref 0.51–0.95)
CRP SERPL-MCNC: 6.5 MG/DL
D DIMER PPP FEU-MCNC: >35.2 MG/L (FEU)
D DIMER PPP FEU-MCNC: ABNORMAL
DIFFERENTIAL METHOD BLD: ABNORMAL
DIFFERENTIAL METHOD BLD: ABNORMAL
EOSINOPHIL # BLD: 0.2 K/MCL (ref 0.1–0.5)
EOSINOPHIL # BLD: 0.5 K/MCL (ref 0.1–0.5)
EOSINOPHIL NFR BLD: 1 %
EOSINOPHIL NFR BLD: 2 %
ERYTHROCYTE [DISTWIDTH] IN BLOOD: 14.6 % (ref 11–15)
ERYTHROCYTE [DISTWIDTH] IN BLOOD: 14.7 % (ref 11–15)
FERRITIN SERPL-MCNC: 2339 NG/ML (ref 8–252)
FIBRINOGEN PPP-MCNC: 235 MG/DL (ref 190–425)
GLOBULIN SER-MCNC: 3.9 G/DL (ref 2–4)
GLOBULIN SER-MCNC: 4.7 G/DL (ref 2–4)
GLUCOSE BLD-MCNC: 153 MG/DL (ref 70–99)
GLUCOSE BLDC GLUCOMTR-MCNC: 120 MG/DL (ref 70–99)
GLUCOSE BLDC GLUCOMTR-MCNC: 126 MG/DL (ref 70–99)
GLUCOSE BLDC GLUCOMTR-MCNC: 126 MG/DL (ref 70–99)
GLUCOSE BLDC GLUCOMTR-MCNC: 146 MG/DL (ref 70–99)
GLUCOSE BLDC GLUCOMTR-MCNC: ABNORMAL MG/DL
GLUCOSE SERPL-MCNC: 119 MG/DL (ref 65–99)
GLUCOSE SERPL-MCNC: 148 MG/DL (ref 65–99)
HCO3 BLDA-SCNC: 26 MMOL/L (ref 22–28)
HCO3 BLDA-SCNC: 26 MMOL/L (ref 22–28)
HCO3 BLDA-SCNC: 27 MMOL/L (ref 22–28)
HCO3 BLDA-SCNC: 28 MMOL/L (ref 22–28)
HCT VFR BLD CALC: 31 % (ref 36–46.5)
HCT VFR BLD CALC: 32.7 % (ref 36–46.5)
HCT VFR BLD CALC: ABNORMAL %
HGB BLD-MCNC: 10.5 G/DL (ref 12–15.5)
HGB BLD-MCNC: 10.5 G/DL (ref 12–15.5)
HGB BLD-MCNC: 10.7 G/DL (ref 12–15.5)
HGB BLD-MCNC: 10.9 G/DL (ref 12–15.5)
HGB BLD-MCNC: 11 G/DL (ref 12–15.5)
HGB BLD-MCNC: 11 G/DL (ref 12–15.5)
HGB BLD-MCNC: 11.1 G/DL (ref 12–15.5)
HGB BLD-MCNC: 11.5 G/DL (ref 12–15.5)
HOROWITZ INDEX BLD+IHG-RTO: ABNORMAL MM[HG]
HYPOCHROMIA (HYPOC): ABNORMAL
INR PPP: 1.1
INR PPP: NORMAL
LACTATE BLDA-MCNC: 0.7 MMOL/L
LACTATE BLDA-MCNC: 0.8 MMOL/L
LACTATE BLDA-MCNC: 0.9 MMOL/L
LACTATE BLDA-MCNC: 1 MMOL/L
LACTATE BLDA-MCNC: 1 MMOL/L
LACTATE BLDA-MCNC: 1.1 MMOL/L
LARGE PLATELETS (PLTL): PRESENT
LARGE PLATELETS (PLTL): PRESENT
LDH SERPL L TO P-CCNC: 769 UNITS/L (ref 82–240)
LYMPHOCYTES # BLD: 1 K/MCL (ref 1–4.8)
LYMPHOCYTES # BLD: 2.9 K/MCL (ref 1–4.8)
LYMPHOCYTES NFR BLD: 11 %
LYMPHOCYTES NFR BLD: 4 %
MAGNESIUM SERPL-MCNC: 1.8 MG/DL (ref 1.7–2.4)
MAGNESIUM SERPL-MCNC: 2 MG/DL (ref 1.7–2.4)
MAGNESIUM SERPL-MCNC: 2.2 MG/DL (ref 1.7–2.4)
MAGNESIUM SERPL-MCNC: 2.7 MG/DL (ref 1.7–2.4)
MCH RBC QN AUTO: 28.7 PG (ref 26–34)
MCH RBC QN AUTO: 29.2 PG (ref 26–34)
MCHC RBC AUTO-ENTMCNC: 33.6 G/DL (ref 32–36.5)
MCHC RBC AUTO-ENTMCNC: 33.9 G/DL (ref 32–36.5)
MCV RBC AUTO: 85.4 FL (ref 78–100)
MCV RBC AUTO: 86.4 FL (ref 78–100)
METAMYELOCYTES NFR BLD: 4 % (ref 0–2)
METHGB MFR BLD: 0.3 %
METHGB MFR BLD: 0.4 %
METHGB MFR BLD: 0.7 %
METHGB MFR BLD: 0.8 %
METHGB MFR BLD: 1 %
METHGB MFR BLD: 1.2 %
MICROCYTOSIS (MICY): ABNORMAL
MONOCYTES # BLD: 1 K/MCL (ref 0.3–0.9)
MONOCYTES # BLD: 1.3 K/MCL (ref 0.3–0.9)
MONOCYTES NFR BLD: 4 %
MONOCYTES NFR BLD: 5 %
MYELOCYTES NFR BLD: 2 %
NEUTROPHILS # BLD: 19.4 K/MCL (ref 1.8–7.7)
NEUTROPHILS # BLD: 22.2 K/MCL (ref 1.8–7.7)
NEUTS BAND NFR BLD: 1 % (ref 0–10)
NEUTS BAND NFR BLD: 3 % (ref 0–10)
NEUTS SEG NFR BLD: 80 %
NEUTS SEG NFR BLD: 82 %
NRBC (NRBCRE): 0 /100 WBC
NRBC (NRBCRE): 0 /100 WBC
NT-PROBNP SERPL-MCNC: 2348 PG/ML
OXYHGB MFR BLD: 95.5 % (ref 94–98)
OXYHGB MFR BLD: 95.7 % (ref 94–98)
OXYHGB MFR BLD: 97 % (ref 94–98)
OXYHGB MFR BLD: 97.3 % (ref 94–98)
OXYHGB MFR BLD: 97.4 % (ref 94–98)
OXYHGB MFR BLDA: 95.4 % (ref 94–98)
PAO2 / FIO2 RATIO (RPFR): 293 (ref 300–500)
PAO2 / FIO2 RATIO (RPFR): ABNORMAL
PATH REV BLD -IMP: ABNORMAL
PATH REV BLD -IMP: ABNORMAL
PCO2 BLDA: 36 MM HG (ref 32–45)
PCO2 BLDA: 37 MM HG (ref 32–45)
PCO2 BLDA: 37 MM HG (ref 32–45)
PCO2 BLDA: 39 MM HG (ref 32–45)
PCO2 BLDA: 41 MM HG (ref 32–45)
PCO2 BLDA: 41 MM HG (ref 32–45)
PH BLDA: 7.43 UNITS (ref 7.35–7.45)
PH BLDA: 7.43 UNITS (ref 7.35–7.45)
PH BLDA: 7.45 UNITS (ref 7.35–7.45)
PH BLDA: 7.47 UNITS (ref 7.35–7.45)
PHOSPHATE SERPL-MCNC: 2.1 MG/DL (ref 2.4–4.7)
PHOSPHATE SERPL-MCNC: 3.3 MG/DL (ref 2.4–4.7)
PHOSPHATE SERPL-MCNC: 3.9 MG/DL (ref 2.4–4.7)
PLATELET # BLD: 181 K/MCL (ref 140–450)
PLATELET # BLD: 185 K/MCL (ref 140–450)
PO2 BLDA: 107 MM HG (ref 83–108)
PO2 BLDA: 117 MM HG (ref 83–108)
PO2 BLDA: 70 MM HG (ref 83–108)
PO2 BLDA: 81 MM HG (ref 83–108)
PO2 BLDA: 85 MM HG (ref 83–108)
PO2 BLDA: 92 MM HG (ref 83–108)
POLYCHROMASIA (POLY): ABNORMAL
POLYCHROMASIA (POLY): ABNORMAL
POTASSIUM BLD-SCNC: 3.4 MMOL/L (ref 3.4–5.1)
POTASSIUM BLD-SCNC: 3.8 MMOL/L (ref 3.4–5.1)
POTASSIUM BLD-SCNC: 3.8 MMOL/L (ref 3.4–5.1)
POTASSIUM BLD-SCNC: 3.9 MMOL/L (ref 3.4–5.1)
POTASSIUM BLD-SCNC: 4.2 MMOL/L (ref 3.4–5.1)
POTASSIUM BLD-SCNC: 4.4 MMOL/L (ref 3.4–5.1)
POTASSIUM SERPL-SCNC: 3.2 MMOL/L (ref 3.4–5.1)
POTASSIUM SERPL-SCNC: 3.5 MMOL/L (ref 3.4–5.1)
POTASSIUM SERPL-SCNC: 3.7 MMOL/L (ref 3.4–5.1)
POTASSIUM SERPL-SCNC: 4.2 MMOL/L (ref 3.4–5.1)
PROCALCITONIN SERPL IA-MCNC: 2.15 NG/ML
PROCALCITONIN SERPL IA-MCNC: ABNORMAL NG/ML
PROT SERPL-MCNC: 8.6 G/DL (ref 6.4–8.2)
PROT SERPL-MCNC: 9.7 G/DL (ref 6.4–8.2)
PROTHROMBIN TIME (PRT2): NORMAL
PROTHROMBIN TIME: 11.7 SEC (ref 9.7–11.8)
RBC # BLD: 3.59 MIL/MCL (ref 4–5.2)
RBC # BLD: 3.83 MIL/MCL (ref 4–5.2)
SAO2 % BLDA: 97 %
SAO2 % BLDA: 98 % (ref 95–99)
SAO2 % BLDA: 98 % (ref 95–99)
SAO2 % BLDA: 99 % (ref 95–99)
SERVICE CMNT-IMP: NORMAL
SODIUM BLD-SCNC: 136 MMOL/L (ref 135–145)
SODIUM BLD-SCNC: 137 MMOL/L (ref 135–145)
SODIUM BLD-SCNC: 138 MMOL/L (ref 135–145)
SODIUM BLD-SCNC: 139 MMOL/L (ref 135–145)
SODIUM BLD-SCNC: 140 MMOL/L (ref 135–145)
SODIUM BLD-SCNC: 141 MMOL/L (ref 135–145)
SODIUM SERPL-SCNC: 136 MMOL/L (ref 135–145)
SODIUM SERPL-SCNC: 137 MMOL/L (ref 135–145)
TOXIC GRANULATION (TOXIC): PRESENT
TOXIC VACUOLATION (TOXV): PRESENT
VARIANT LYMPHS NFR BLD: 1 % (ref 0–5)
WBC # BLD: 24 K/MCL (ref 4.2–11)
WBC # BLD: 26.1 K/MCL (ref 4.2–11)
WBC MORPH BLD: NORMAL

## 2020-07-14 PROCEDURE — 99233 SBSQ HOSP IP/OBS HIGH 50: CPT | Performed by: INTERNAL MEDICINE

## 2020-07-15 LAB
ALBUMIN SERPL-MCNC: 4.5 G/DL (ref 3.6–5.1)
ALBUMIN/GLOB SERPL: 1.1 {RATIO} (ref 1–2.4)
ALP SERPL-CCNC: 101 UNITS/L (ref 45–117)
ALT SERPL-CCNC: 124 UNITS/L
ANALYZER ANC (IANC): ABNORMAL
ANION GAP SERPL CALC-SCNC: 15 MMOL/L (ref 10–20)
ANION GAP SERPL CALC-SCNC: 16 MMOL/L (ref 10–20)
APTT PPP: 35 SEC (ref 22–32)
APTT PPP: ABNORMAL S
APTT PPP: ABNORMAL S
AST SERPL-CCNC: 87 UNITS/L
BASE DEFICIT BLDA-SCNC: ABNORMAL MMOL/L
BASE EXCESS BLDA CALC-SCNC: 1 MMOL/L (ref 0–3)
BASE EXCESS BLDA CALC-SCNC: 2 MMOL/L (ref 0–3)
BASE EXCESS BLDA CALC-SCNC: 3 MMOL/L (ref 0–3)
BASE EXCESS BLDA CALC-SCNC: 3 MMOL/L (ref 0–3)
BASOPHILS # BLD: 0.2 K/MCL (ref 0–0.3)
BASOPHILS NFR BLD: 1 %
BDY SITE: ABNORMAL
BILIRUB SERPL-MCNC: 1.1 MG/DL (ref 0.2–1)
BODY TEMPERATURE: 36.6 DEGREES
BODY TEMPERATURE: 37.1 DEGREES
BODY TEMPERATURE: 37.5 DEGREES
BUN SERPL-MCNC: 39 MG/DL (ref 6–20)
BUN SERPL-MCNC: 58 MG/DL (ref 6–20)
BUN/CREAT SERPL: 26 (ref 7–25)
BUN/CREAT SERPL: 26 (ref 7–25)
CA-I BLD ISE-SCNC: 1.27 MMOL/L (ref 1.15–1.29)
CA-I BLD ISE-SCNC: 1.3 MMOL/L (ref 1.15–1.29)
CA-I BLDA-SCNC: 14 % (ref 15–23)
CA-I BLDA-SCNC: 16 ML/DL (ref 15–23)
CALCIUM SERPL-MCNC: 10 MG/DL (ref 8.4–10.2)
CALCIUM SERPL-MCNC: 9.9 MG/DL (ref 8.4–10.2)
CHLORIDE SERPL-SCNC: 100 MMOL/L (ref 98–107)
CHLORIDE SERPL-SCNC: 98 MMOL/L (ref 98–107)
CK SERPL-CCNC: 34 UNITS/L (ref 26–192)
CO2 SERPL-SCNC: 27 MMOL/L (ref 21–32)
CO2 SERPL-SCNC: 28 MMOL/L (ref 21–32)
COHGB MFR BLD: 1.2 %
COHGB MFR BLD: 1.3 %
COHGB MFR BLD: 1.4 %
COHGB MFR BLD: 1.7 %
CONDITION, POC: ABNORMAL
CONDITION-RC: ABNORMAL
CONDITION: ABNORMAL
CREAT SERPL-MCNC: 1.52 MG/DL (ref 0.51–0.95)
CREAT SERPL-MCNC: 2.25 MG/DL (ref 0.51–0.95)
CRP SERPL-MCNC: 5.2 MG/DL
D DIMER PPP FEU-MCNC: >35.2 MG/L (FEU)
D DIMER PPP FEU-MCNC: ABNORMAL
DIFFERENTIAL METHOD BLD: ABNORMAL
EOSINOPHIL # BLD: 1 K/MCL (ref 0.1–0.5)
EOSINOPHIL NFR BLD: 4 %
ERYTHROCYTE [DISTWIDTH] IN BLOOD: 14.6 % (ref 11–15)
FERRITIN SERPL-MCNC: 2893 NG/ML (ref 8–252)
FIBRINOGEN PPP-MCNC: 190 MG/DL (ref 190–425)
GLOBULIN SER-MCNC: 4 G/DL (ref 2–4)
GLUCOSE BLD-MCNC: 150 MG/DL (ref 70–99)
GLUCOSE BLDC GLUCOMTR-MCNC: 119 MG/DL (ref 70–99)
GLUCOSE BLDC GLUCOMTR-MCNC: 140 MG/DL (ref 70–99)
GLUCOSE BLDC GLUCOMTR-MCNC: 151 MG/DL (ref 70–99)
GLUCOSE SERPL-MCNC: 114 MG/DL (ref 65–99)
GLUCOSE SERPL-MCNC: 130 MG/DL (ref 65–99)
HCO3 BLDA-SCNC: 26 MMOL/L (ref 22–28)
HCO3 BLDA-SCNC: 27 MMOL/L (ref 22–28)
HCO3 BLDA-SCNC: 28 MMOL/L (ref 22–28)
HCO3 BLDA-SCNC: 29 MMOL/L (ref 22–28)
HCT VFR BLD CALC: 29.7 % (ref 36–46.5)
HCT VFR BLD CALC: ABNORMAL %
HGB BLD-MCNC: 10.3 G/DL (ref 12–15.5)
HGB BLD-MCNC: 10.4 G/DL (ref 12–15.5)
HGB BLD-MCNC: 10.6 G/DL (ref 12–15.5)
HGB BLD-MCNC: 9.8 G/DL (ref 12–15.5)
HGB BLD-MCNC: 9.9 G/DL (ref 12–15.5)
HOROWITZ INDEX BLD+IHG-RTO: ABNORMAL MM[HG]
HYPOCHROMIA (HYPOC): ABNORMAL
INR PPP: 1.2
INR PPP: ABNORMAL
LACTATE BLDA-MCNC: 0.6 MMOL/L
LACTATE BLDA-MCNC: 0.7 MMOL/L
LACTATE BLDA-MCNC: 1 MMOL/L
LACTATE BLDA-MCNC: 1.1 MMOL/L
LARGE PLATELETS (PLTL): PRESENT
LDH SERPL L TO P-CCNC: 841 UNITS/L (ref 82–240)
LYMPHOCYTES # BLD: 2.2 K/MCL (ref 1–4.8)
LYMPHOCYTES NFR BLD: 9 %
MAGNESIUM SERPL-MCNC: 2.1 MG/DL (ref 1.7–2.4)
MAGNESIUM SERPL-MCNC: 2.7 MG/DL (ref 1.7–2.4)
MCH RBC QN AUTO: 28.5 PG (ref 26–34)
MCHC RBC AUTO-ENTMCNC: 33 G/DL (ref 32–36.5)
MCV RBC AUTO: 86.3 FL (ref 78–100)
METAMYELOCYTES NFR BLD: 1 % (ref 0–2)
METHGB MFR BLD: 0.2 %
METHGB MFR BLD: 0.5 %
METHGB MFR BLD: 0.8 %
METHGB MFR BLD: 0.9 %
MONOCYTES # BLD: 2.2 K/MCL (ref 0.3–0.9)
MONOCYTES NFR BLD: 9 %
MYELOCYTES NFR BLD: 2 %
NEUTROPHILS # BLD: 18.3 K/MCL (ref 1.8–7.7)
NEUTS BAND NFR BLD: 1 % (ref 0–10)
NEUTS SEG NFR BLD: 73 %
NRBC (NRBCRE): 0 /100 WBC
NT-PROBNP SERPL-MCNC: 1338 PG/ML
OXYHGB MFR BLD: 96.7 % (ref 94–98)
OXYHGB MFR BLD: 96.9 % (ref 94–98)
OXYHGB MFR BLD: 97 % (ref 94–98)
OXYHGB MFR BLDA: 98.1 % (ref 94–98)
PAO2 / FIO2 RATIO (RPFR): ABNORMAL
PATH REV BLD -IMP: ABNORMAL
PCO2 BLDA: 41 MM HG (ref 32–45)
PCO2 BLDA: 41 MM HG (ref 32–45)
PCO2 BLDA: 42 MM HG (ref 32–45)
PCO2 BLDA: 46 MM HG (ref 32–45)
PH BLDA: 7.4 UNITS (ref 7.35–7.45)
PH BLDA: 7.4 UNITS (ref 7.35–7.45)
PH BLDA: 7.43 UNITS (ref 7.35–7.45)
PH BLDA: 7.44 UNITS (ref 7.35–7.45)
PHOSPHATE SERPL-MCNC: 2.7 MG/DL (ref 2.4–4.7)
PHOSPHATE SERPL-MCNC: 4.4 MG/DL (ref 2.4–4.7)
PLATELET # BLD: 168 K/MCL (ref 140–450)
PO2 BLDA: 117 MM HG (ref 83–108)
PO2 BLDA: 140 MM HG (ref 83–108)
PO2 BLDA: 321 MM HG (ref 83–108)
PO2 BLDA: 91 MM HG (ref 83–108)
POLYCHROMASIA (POLY): ABNORMAL
POTASSIUM BLD-SCNC: 3.9 MMOL/L (ref 3.4–5.1)
POTASSIUM BLD-SCNC: 4 MMOL/L (ref 3.4–5.1)
POTASSIUM BLD-SCNC: 4 MMOL/L (ref 3.4–5.1)
POTASSIUM BLD-SCNC: 4.1 MMOL/L (ref 3.4–5.1)
POTASSIUM SERPL-SCNC: 3.8 MMOL/L (ref 3.4–5.1)
POTASSIUM SERPL-SCNC: 3.9 MMOL/L (ref 3.4–5.1)
PROCALCITONIN SERPL IA-MCNC: 2.79 NG/ML
PROCALCITONIN SERPL IA-MCNC: ABNORMAL NG/ML
PROT SERPL-MCNC: 8.5 G/DL (ref 6.4–8.2)
PROTHROMBIN TIME (PRT2): ABNORMAL
PROTHROMBIN TIME: 12.6 SEC (ref 9.7–11.8)
RBC # BLD: 3.44 MIL/MCL (ref 4–5.2)
SAO2 % BLDA: 100 %
SAO2 % BLDA: 99 % (ref 95–99)
SERVICE CMNT-IMP: NORMAL
SODIUM BLD-SCNC: 137 MMOL/L (ref 135–145)
SODIUM BLD-SCNC: 137 MMOL/L (ref 135–145)
SODIUM BLD-SCNC: 139 MMOL/L (ref 135–145)
SODIUM BLD-SCNC: 143 MMOL/L (ref 135–145)
SODIUM SERPL-SCNC: 137 MMOL/L (ref 135–145)
SODIUM SERPL-SCNC: 139 MMOL/L (ref 135–145)
TOXIC GRANULATION (TOXIC): PRESENT
WBC # BLD: 24.7 K/MCL (ref 4.2–11)

## 2020-07-15 PROCEDURE — 99233 SBSQ HOSP IP/OBS HIGH 50: CPT | Performed by: INTERNAL MEDICINE

## 2020-07-15 PROCEDURE — 99232 SBSQ HOSP IP/OBS MODERATE 35: CPT | Performed by: INTERNAL MEDICINE

## 2020-07-16 LAB
ALBUMIN SERPL-MCNC: 4.7 G/DL (ref 3.6–5.1)
ALBUMIN/GLOB SERPL: 1.2 {RATIO} (ref 1–2.4)
ALP SERPL-CCNC: 87 UNITS/L (ref 45–117)
ALT SERPL-CCNC: 123 UNITS/L
ANALYZER ANC (IANC): ABNORMAL
ANION GAP SERPL CALC-SCNC: 10 MMOL/L (ref 10–20)
ANION GAP SERPL CALC-SCNC: 12 MMOL/L (ref 10–20)
APTT PPP: 36 SEC (ref 22–32)
APTT PPP: ABNORMAL S
APTT PPP: ABNORMAL S
AST SERPL-CCNC: 84 UNITS/L
BASE DEFICIT BLDA-SCNC: ABNORMAL MMOL/L
BASE EXCESS BLDA CALC-SCNC: 2 MMOL/L (ref 0–3)
BASE EXCESS BLDA CALC-SCNC: 2 MMOL/L (ref 0–3)
BASE EXCESS BLDA CALC-SCNC: 3 MMOL/L (ref 0–3)
BASE EXCESS BLDA CALC-SCNC: ABNORMAL MMOL/L
BASOPHILS # BLD: 0.2 K/MCL (ref 0–0.3)
BASOPHILS NFR BLD: 1 %
BDY SITE: ABNORMAL
BILIRUB SERPL-MCNC: 1 MG/DL (ref 0.2–1)
BODY TEMPERATURE: 36.4 DEGREES
BODY TEMPERATURE: 37 DEGREES
BODY TEMPERATURE: 37.5 DEGREES
BUN SERPL-MCNC: 34 MG/DL (ref 6–20)
BUN SERPL-MCNC: 57 MG/DL (ref 6–20)
BUN/CREAT SERPL: 24 (ref 7–25)
BUN/CREAT SERPL: 26 (ref 7–25)
CA-I BLD ISE-SCNC: 1.23 MMOL/L (ref 1.15–1.29)
CA-I BLD ISE-SCNC: 1.24 MMOL/L (ref 1.15–1.29)
CA-I BLD ISE-SCNC: 1.28 MMOL/L (ref 1.15–1.29)
CA-I BLD ISE-SCNC: 1.31 MMOL/L (ref 1.15–1.29)
CA-I BLDA-SCNC: 13 % (ref 15–23)
CA-I BLDA-SCNC: 14 % (ref 15–23)
CA-I BLDA-SCNC: 14 % (ref 15–23)
CA-I BLDA-SCNC: 14.6 ML/DL (ref 15–23)
CALCIUM SERPL-MCNC: 10.1 MG/DL (ref 8.4–10.2)
CALCIUM SERPL-MCNC: 9.7 MG/DL (ref 8.4–10.2)
CHLORIDE SERPL-SCNC: 100 MMOL/L (ref 98–107)
CHLORIDE SERPL-SCNC: 101 MMOL/L (ref 98–107)
CK SERPL-CCNC: 34 UNITS/L (ref 26–192)
CO2 SERPL-SCNC: 28 MMOL/L (ref 21–32)
CO2 SERPL-SCNC: 29 MMOL/L (ref 21–32)
COHGB MFR BLD: 0.8 %
COHGB MFR BLD: 0.9 %
COHGB MFR BLD: 1.2 %
COHGB MFR BLD: 1.3 %
CONDITION-RC: ABNORMAL
CONDITION: ABNORMAL
CREAT SERPL-MCNC: 1.43 MG/DL (ref 0.51–0.95)
CREAT SERPL-MCNC: 2.16 MG/DL (ref 0.51–0.95)
CRP SERPL-MCNC: 3.8 MG/DL
D DIMER PPP FEU-MCNC: >35.2 MG/L (FEU)
D DIMER PPP FEU-MCNC: ABNORMAL
DIFFERENTIAL METHOD BLD: ABNORMAL
EOSINOPHIL # BLD: 0.2 K/MCL (ref 0.1–0.5)
EOSINOPHIL NFR BLD: 1 %
ERYTHROCYTE [DISTWIDTH] IN BLOOD: 14.7 % (ref 11–15)
FERRITIN SERPL-MCNC: 3036 NG/ML (ref 8–252)
FIBRINOGEN PPP-MCNC: 200 MG/DL (ref 190–425)
GLOBULIN SER-MCNC: 3.8 G/DL (ref 2–4)
GLUCOSE BLD-MCNC: 146 MG/DL (ref 70–99)
GLUCOSE BLDC GLUCOMTR-MCNC: 126 MG/DL (ref 70–99)
GLUCOSE BLDC GLUCOMTR-MCNC: 139 MG/DL (ref 70–99)
GLUCOSE BLDC GLUCOMTR-MCNC: 143 MG/DL (ref 70–99)
GLUCOSE SERPL-MCNC: 126 MG/DL (ref 65–99)
GLUCOSE SERPL-MCNC: 152 MG/DL (ref 65–99)
HCO3 BLDA-SCNC: 27 MMOL/L (ref 22–28)
HCO3 BLDA-SCNC: ABNORMAL MMOL/L
HCT VFR BLD CALC: 28.6 % (ref 36–46.5)
HCT VFR BLD CALC: ABNORMAL %
HGB BLD-MCNC: 10.1 G/DL (ref 12–15.5)
HGB BLD-MCNC: 9.2 G/DL (ref 12–15.5)
HGB BLD-MCNC: 9.4 G/DL (ref 12–15.5)
HGB BLD-MCNC: 9.6 G/DL (ref 12–15.5)
HGB BLD-MCNC: 9.8 G/DL (ref 12–15.5)
HOROWITZ INDEX BLD+IHG-RTO: ABNORMAL MM[HG]
INR PPP: 1.2
INR PPP: ABNORMAL
LACTATE BLDA-MCNC: 0.7 MMOL/L
LACTATE BLDA-MCNC: 0.8 MMOL/L
LACTATE BLDA-MCNC: 0.9 MMOL/L
LACTATE BLDA-MCNC: 1.4 MMOL/L
LARGE PLATELETS (PLTL): PRESENT
LDH SERPL L TO P-CCNC: 727 UNITS/L (ref 82–240)
LYMPHOCYTES # BLD: 2.4 K/MCL (ref 1–4.8)
LYMPHOCYTES NFR BLD: 10 %
MAGNESIUM SERPL-MCNC: 2.1 MG/DL (ref 1.7–2.4)
MCH RBC QN AUTO: 29.2 PG (ref 26–34)
MCHC RBC AUTO-ENTMCNC: 32.2 G/DL (ref 32–36.5)
MCV RBC AUTO: 90.8 FL (ref 78–100)
METAMYELOCYTES NFR BLD: 4 % (ref 0–2)
METHGB MFR BLD: 0.2 %
METHGB MFR BLD: 0.5 %
METHGB MFR BLD: 0.9 %
METHGB MFR BLD: 0.9 %
MONOCYTES # BLD: 0.6 K/MCL (ref 0.3–0.9)
MONOCYTES NFR BLD: 3 %
MYELOCYTES NFR BLD: 4 %
NEUTROPHILS # BLD: 15.2 K/MCL (ref 1.8–7.7)
NEUTS BAND NFR BLD: 2 % (ref 0–10)
NEUTS SEG NFR BLD: 73 %
NRBC (NRBCRE): 0 /100 WBC
NT-PROBNP SERPL-MCNC: 915 PG/ML
OXYHGB MFR BLD: 96.7 % (ref 94–98)
OXYHGB MFR BLD: 96.8 % (ref 94–98)
OXYHGB MFR BLD: 98.2 % (ref 94–98)
OXYHGB MFR BLDA: 97.8 % (ref 94–98)
PAO2 / FIO2 RATIO (RPFR): ABNORMAL
PATH REV BLD -IMP: ABNORMAL
PCO2 BLDA: 40 MM HG (ref 32–45)
PCO2 BLDA: 44 MM HG (ref 32–45)
PCO2 BLDA: 45 MM HG (ref 32–45)
PCO2 BLDA: ABNORMAL MM[HG]
PH BLDA: 7.39 UNITS (ref 7.35–7.45)
PH BLDA: 7.4 UNITS (ref 7.35–7.45)
PH BLDA: 7.44 UNITS (ref 7.35–7.45)
PH BLDA: 7.47 UNITS (ref 7.35–7.45)
PHOSPHATE SERPL-MCNC: 3.7 MG/DL (ref 2.4–4.7)
PLATELET # BLD: 169 K/MCL (ref 140–450)
PO2 BLDA: 173 MM HG (ref 83–108)
PO2 BLDA: 179 MM HG (ref 83–108)
PO2 BLDA: 397 MM HG (ref 83–108)
PO2 BLDA: 95 MM HG (ref 83–108)
POLYCHROMASIA (POLY): ABNORMAL
POTASSIUM BLD-SCNC: 3.6 MMOL/L (ref 3.4–5.1)
POTASSIUM BLD-SCNC: 3.7 MMOL/L (ref 3.4–5.1)
POTASSIUM BLD-SCNC: 3.7 MMOL/L (ref 3.4–5.1)
POTASSIUM BLD-SCNC: 4.2 MMOL/L (ref 3.4–5.1)
POTASSIUM SERPL-SCNC: 3.1 MMOL/L (ref 3.4–5.1)
POTASSIUM SERPL-SCNC: 3.5 MMOL/L (ref 3.4–5.1)
PROCALCITONIN SERPL IA-MCNC: 2.89 NG/ML
PROCALCITONIN SERPL IA-MCNC: ABNORMAL NG/ML
PROT SERPL-MCNC: 8.5 G/DL (ref 6.4–8.2)
PROTHROMBIN TIME (PRT2): ABNORMAL
PROTHROMBIN TIME: 12.8 SEC (ref 9.7–11.8)
RBC # BLD: 3.15 MIL/MCL (ref 4–5.2)
SAO2 % BLDA: 100 %
SAO2 % BLDA: 99 % (ref 95–99)
SERVICE CMNT-IMP: NORMAL
SODIUM BLD-SCNC: 139 MMOL/L (ref 135–145)
SODIUM BLD-SCNC: 140 MMOL/L (ref 135–145)
SODIUM SERPL-SCNC: 136 MMOL/L (ref 135–145)
SODIUM SERPL-SCNC: 138 MMOL/L (ref 135–145)
TOXIC GRANULATION (TOXIC): PRESENT
VARIANT LYMPHS NFR BLD: 2 % (ref 0–5)
WBC # BLD: 20.2 K/MCL (ref 4.2–11)

## 2020-07-16 PROCEDURE — 99233 SBSQ HOSP IP/OBS HIGH 50: CPT | Performed by: INTERNAL MEDICINE

## 2020-07-16 PROCEDURE — 99232 SBSQ HOSP IP/OBS MODERATE 35: CPT | Performed by: INTERNAL MEDICINE

## 2020-07-17 LAB
ALBUMIN SERPL-MCNC: 4.7 G/DL (ref 3.6–5.1)
ALBUMIN/GLOB SERPL: 1.2 {RATIO} (ref 1–2.4)
ALP SERPL-CCNC: 86 UNITS/L (ref 45–117)
ALT SERPL-CCNC: 118 UNITS/L
ANALYZER ANC (IANC): ABNORMAL
ANION GAP SERPL CALC-SCNC: 11 MMOL/L (ref 10–20)
APTT PPP: 35 SEC (ref 22–32)
APTT PPP: ABNORMAL S
APTT PPP: ABNORMAL S
AST SERPL-CCNC: 79 UNITS/L
BASE DEFICIT BLDA-SCNC: ABNORMAL MMOL/L
BASE EXCESS BLDA CALC-SCNC: 1 MMOL/L (ref 0–3)
BASE EXCESS BLDA CALC-SCNC: 3 MMOL/L (ref 0–3)
BASE EXCESS BLDA CALC-SCNC: 3 MMOL/L (ref 0–3)
BASE EXCESS BLDA CALC-SCNC: 4 MMOL/L (ref 0–3)
BASOPHILS # BLD: 0.4 K/MCL (ref 0–0.3)
BASOPHILS NFR BLD: 2 %
BDY SITE: ABNORMAL
BILIRUB SERPL-MCNC: 1 MG/DL (ref 0.2–1)
BLOOD BANK CMNT PATIENT-IMP: NORMAL
BODY TEMPERATURE: 36.6 DEGREES
BODY TEMPERATURE: 37 DEGREES
BODY TEMPERATURE: 37 DEGREES
BUN SERPL-MCNC: 50 MG/DL (ref 6–20)
BUN/CREAT SERPL: 25 (ref 7–25)
CA-I BLD ISE-SCNC: 1.21 MMOL/L (ref 1.15–1.29)
CA-I BLD ISE-SCNC: 1.27 MMOL/L (ref 1.15–1.29)
CA-I BLD ISE-SCNC: 1.27 MMOL/L (ref 1.15–1.29)
CA-I BLD ISE-SCNC: 1.33 MMOL/L (ref 1.15–1.29)
CA-I BLDA-SCNC: 13 % (ref 15–23)
CA-I BLDA-SCNC: 14 % (ref 15–23)
CA-I BLDA-SCNC: 14 % (ref 15–23)
CA-I BLDA-SCNC: 14 ML/DL (ref 15–23)
CALCIUM SERPL-MCNC: 10 MG/DL (ref 8.4–10.2)
CHLORIDE SERPL-SCNC: 100 MMOL/L (ref 98–107)
CK SERPL-CCNC: 34 UNITS/L (ref 26–192)
CO2 SERPL-SCNC: 28 MMOL/L (ref 21–32)
COHGB MFR BLD: 0.9 %
COHGB MFR BLD: 0.9 %
COHGB MFR BLD: 1.4 %
COHGB MFR BLD: 1.6 %
CONDITION-RC: ABNORMAL
CONDITION: ABNORMAL
CREAT SERPL-MCNC: 2.01 MG/DL (ref 0.51–0.95)
CRP SERPL-MCNC: 3.7 MG/DL
D DIMER PPP FEU-MCNC: >35.2 MG/L (FEU)
D DIMER PPP FEU-MCNC: ABNORMAL
DIFFERENTIAL METHOD BLD: ABNORMAL
EOSINOPHIL # BLD: 0.8 K/MCL (ref 0.1–0.5)
EOSINOPHIL NFR BLD: 4 %
ERYTHROCYTE [DISTWIDTH] IN BLOOD: 14.9 % (ref 11–15)
FERRITIN SERPL-MCNC: 3321 NG/ML (ref 8–252)
FIBRINOGEN PPP-MCNC: 188 MG/DL (ref 190–425)
GLOBULIN SER-MCNC: 3.8 G/DL (ref 2–4)
GLUCOSE BLD-MCNC: 160 MG/DL (ref 70–99)
GLUCOSE BLDC GLUCOMTR-MCNC: 107 MG/DL (ref 70–99)
GLUCOSE BLDC GLUCOMTR-MCNC: 126 MG/DL (ref 70–99)
GLUCOSE SERPL-MCNC: 113 MG/DL (ref 65–99)
HCO3 BLDA-SCNC: 27 MMOL/L (ref 22–28)
HCO3 BLDA-SCNC: 28 MMOL/L (ref 22–28)
HCO3 BLDA-SCNC: 28 MMOL/L (ref 22–28)
HCO3 BLDA-SCNC: 29 MMOL/L (ref 22–28)
HCT VFR BLD CALC: 27.5 % (ref 36–46.5)
HCT VFR BLD CALC: 29.1 % (ref 36–46.5)
HCT VFR BLD CALC: ABNORMAL %
HGB BLD-MCNC: 10 G/DL (ref 12–15.5)
HGB BLD-MCNC: 8.8 G/DL (ref 12–15.5)
HGB BLD-MCNC: 9.4 G/DL (ref 12–15.5)
HGB BLD-MCNC: 9.6 G/DL (ref 12–15.5)
HGB BLD-MCNC: 9.6 G/DL (ref 12–15.5)
HGB BLD-MCNC: 9.9 G/DL (ref 12–15.5)
HOROWITZ INDEX BLD+IHG-RTO: ABNORMAL MM[HG]
HYPOCHROMIA (HYPOC): ABNORMAL
INR PPP: 1.2
INR PPP: ABNORMAL
LACTATE BLDA-MCNC: 0.6 MMOL/L
LACTATE BLDA-MCNC: 0.8 MMOL/L
LACTATE BLDA-MCNC: 0.9 MMOL/L
LACTATE BLDA-MCNC: 1 MMOL/L
LDH SERPL L TO P-CCNC: 725 UNITS/L (ref 82–240)
LYMPHOCYTES # BLD: 1.4 K/MCL (ref 1–4.8)
LYMPHOCYTES NFR BLD: 6 %
MAGNESIUM SERPL-MCNC: 1.8 MG/DL (ref 1.7–2.4)
MCH RBC QN AUTO: 28.3 PG (ref 26–34)
MCHC RBC AUTO-ENTMCNC: 32 G/DL (ref 32–36.5)
MCV RBC AUTO: 88.4 FL (ref 78–100)
METAMYELOCYTES NFR BLD: 5 % (ref 0–2)
METHGB MFR BLD: 0.5 %
METHGB MFR BLD: 0.6 %
METHGB MFR BLD: 0.7 %
METHGB MFR BLD: 0.9 %
MONOCYTES # BLD: 1.2 K/MCL (ref 0.3–0.9)
MONOCYTES NFR BLD: 6 %
MYELOCYTES NFR BLD: 4 %
NEUTROPHILS # BLD: 14.2 K/MCL (ref 1.8–7.7)
NEUTS SEG NFR BLD: 72 %
NRBC (NRBCRE): 0 /100 WBC
NT-PROBNP SERPL-MCNC: 725 PG/ML
NUM BPU REQUESTED: 1
OXYHGB MFR BLD: 96.9 % (ref 94–98)
OXYHGB MFR BLD: 97.4 % (ref 94–98)
OXYHGB MFR BLD: 97.5 % (ref 94–98)
OXYHGB MFR BLDA: 97.6 % (ref 94–98)
PAO2 / FIO2 RATIO (RPFR): ABNORMAL
PATH REV BLD -IMP: ABNORMAL
PCO2 BLDA: 40 MM HG (ref 32–45)
PCO2 BLDA: 44 MM HG (ref 32–45)
PCO2 BLDA: 45 MM HG (ref 32–45)
PCO2 BLDA: 49 MM HG (ref 32–45)
PH BLDA: 7.35 UNITS (ref 7.35–7.45)
PH BLDA: 7.4 UNITS (ref 7.35–7.45)
PH BLDA: 7.42 UNITS (ref 7.35–7.45)
PH BLDA: 7.45 UNITS (ref 7.35–7.45)
PHOSPHATE SERPL-MCNC: 3.5 MG/DL (ref 2.4–4.7)
PLAT MORPH BLD: NORMAL
PLATELET # BLD: 159 K/MCL (ref 140–450)
PO2 BLDA: 130 MM HG (ref 83–108)
PO2 BLDA: 150 MM HG (ref 83–108)
PO2 BLDA: 179 MM HG (ref 83–108)
PO2 BLDA: 214 MM HG (ref 83–108)
POLYCHROMASIA (POLY): ABNORMAL
POTASSIUM BLD-SCNC: 3.5 MMOL/L (ref 3.4–5.1)
POTASSIUM BLD-SCNC: 3.6 MMOL/L (ref 3.4–5.1)
POTASSIUM BLD-SCNC: 3.8 MMOL/L (ref 3.4–5.1)
POTASSIUM BLD-SCNC: 3.8 MMOL/L (ref 3.4–5.1)
POTASSIUM SERPL-SCNC: 3.4 MMOL/L (ref 3.4–5.1)
POTASSIUM SERPL-SCNC: 3.5 MMOL/L (ref 3.4–5.1)
PROCALCITONIN SERPL IA-MCNC: 3.12 NG/ML
PROCALCITONIN SERPL IA-MCNC: ABNORMAL NG/ML
PROT SERPL-MCNC: 8.5 G/DL (ref 6.4–8.2)
PROTHROMBIN TIME (PRT2): ABNORMAL
PROTHROMBIN TIME: 12.8 SEC (ref 9.7–11.8)
RBC # BLD: 3.11 MIL/MCL (ref 4–5.2)
SAO2 % BLDA: 100 %
SAO2 % BLDA: 99 % (ref 95–99)
SERVICE CMNT-IMP: ABNORMAL
SODIUM BLD-SCNC: 137 MMOL/L (ref 135–145)
SODIUM BLD-SCNC: 138 MMOL/L (ref 135–145)
SODIUM BLD-SCNC: 139 MMOL/L (ref 135–145)
SODIUM BLD-SCNC: 139 MMOL/L (ref 135–145)
SODIUM SERPL-SCNC: 136 MMOL/L (ref 135–145)
VARIANT LYMPHS NFR BLD: 1 % (ref 0–5)
WBC # BLD: 19.7 K/MCL (ref 4.2–11)
WBC MORPH BLD: NORMAL

## 2020-07-17 PROCEDURE — 99233 SBSQ HOSP IP/OBS HIGH 50: CPT | Performed by: INTERNAL MEDICINE

## 2020-07-17 PROCEDURE — 99232 SBSQ HOSP IP/OBS MODERATE 35: CPT | Performed by: INTERNAL MEDICINE

## 2020-07-18 LAB
ABO + RH BLD: NORMAL
ALBUMIN SERPL-MCNC: 4.6 G/DL (ref 3.6–5.1)
ALBUMIN/GLOB SERPL: 1.2 {RATIO} (ref 1–2.4)
ALP SERPL-CCNC: 84 UNITS/L (ref 45–117)
ALT SERPL-CCNC: 114 UNITS/L
ANALYZER ANC (IANC): ABNORMAL
ANION GAP SERPL CALC-SCNC: 13 MMOL/L (ref 10–20)
APTT PPP: 34 SEC (ref 22–32)
APTT PPP: ABNORMAL S
APTT PPP: ABNORMAL S
AST SERPL-CCNC: 81 UNITS/L
BASE DEFICIT BLDA-SCNC: ABNORMAL MMOL/L
BASE EXCESS BLDA CALC-SCNC: 1 MMOL/L (ref 0–3)
BASE EXCESS BLDA CALC-SCNC: 2 MMOL/L (ref 0–3)
BASE EXCESS BLDA CALC-SCNC: 3 MMOL/L (ref 0–3)
BASE EXCESS BLDA CALC-SCNC: 4 MMOL/L (ref 0–3)
BASOPHILS # BLD: 0.2 K/MCL (ref 0–0.3)
BASOPHILS NFR BLD: 1 %
BDY SITE: ABNORMAL
BILIRUB SERPL-MCNC: 1.1 MG/DL (ref 0.2–1)
BLD GP AB SCN SERPL QL: NEGATIVE
BLD PROD TYP BPU: NORMAL
BLD UNIT ID BPU: NORMAL
BODY TEMPERATURE: 36.8 DEGREES
BODY TEMPERATURE: 36.8 DEGREES
BODY TEMPERATURE: 37 DEGREES
BUN SERPL-MCNC: 39 MG/DL (ref 6–20)
BUN/CREAT SERPL: 23 (ref 7–25)
CA-I BLD ISE-SCNC: 1.25 MMOL/L (ref 1.15–1.29)
CA-I BLD ISE-SCNC: 1.26 MMOL/L (ref 1.15–1.29)
CA-I BLD ISE-SCNC: 1.28 MMOL/L (ref 1.15–1.29)
CA-I BLD ISE-SCNC: 1.3 MMOL/L (ref 1.15–1.29)
CA-I BLDA-SCNC: 13 % (ref 15–23)
CA-I BLDA-SCNC: 14 % (ref 15–23)
CA-I BLDA-SCNC: 14 % (ref 15–23)
CA-I BLDA-SCNC: 15 ML/DL (ref 15–23)
CALCIUM SERPL-MCNC: 9.9 MG/DL (ref 8.4–10.2)
CHLORIDE SERPL-SCNC: 99 MMOL/L (ref 98–107)
CK SERPL-CCNC: 35 UNITS/L (ref 26–192)
CO2 SERPL-SCNC: 27 MMOL/L (ref 21–32)
COHGB MFR BLD: 1 %
COHGB MFR BLD: 1.2 %
COHGB MFR BLD: 1.7 %
COHGB MFR BLD: 1.8 %
CONDITION, POC: ABNORMAL
CONDITION-RC: ABNORMAL
CONDITION: ABNORMAL
CREAT SERPL-MCNC: 1.73 MG/DL (ref 0.51–0.95)
CROSSMATCH EXPIRE: NORMAL
CRP SERPL-MCNC: 4.2 MG/DL
D DIMER PPP FEU-MCNC: >35.2 MG/L (FEU)
D DIMER PPP FEU-MCNC: ABNORMAL
DIFFERENTIAL METHOD BLD: ABNORMAL
EOSINOPHIL # BLD: 1.3 K/MCL (ref 0.1–0.5)
EOSINOPHIL NFR BLD: 7 %
ERYTHROCYTE [DISTWIDTH] IN BLOOD: 14.5 % (ref 11–15)
FERRITIN SERPL-MCNC: 3949 NG/ML (ref 8–252)
FIBRINOGEN PPP-MCNC: 200 MG/DL (ref 190–425)
GLOBULIN SER-MCNC: 3.7 G/DL (ref 2–4)
GLUCOSE BLD-MCNC: 135 MG/DL (ref 70–99)
GLUCOSE BLDC GLUCOMTR-MCNC: 103 MG/DL (ref 70–99)
GLUCOSE BLDC GLUCOMTR-MCNC: 110 MG/DL (ref 70–99)
GLUCOSE BLDC GLUCOMTR-MCNC: 130 MG/DL (ref 70–99)
GLUCOSE BLDC GLUCOMTR-MCNC: 132 MG/DL (ref 70–99)
GLUCOSE SERPL-MCNC: 108 MG/DL (ref 65–99)
HCO3 BLDA-SCNC: 27 MMOL/L (ref 22–28)
HCO3 BLDA-SCNC: 27 MMOL/L (ref 22–28)
HCO3 BLDA-SCNC: 28 MMOL/L (ref 22–28)
HCO3 BLDA-SCNC: 29 MMOL/L (ref 22–28)
HCT VFR BLD CALC: 28.5 % (ref 36–46.5)
HCT VFR BLD CALC: ABNORMAL %
HGB BLD-MCNC: 10 G/DL (ref 12–15.5)
HGB BLD-MCNC: 10.7 G/DL (ref 12–15.5)
HGB BLD-MCNC: 9.4 G/DL (ref 12–15.5)
HGB BLD-MCNC: 9.5 G/DL (ref 12–15.5)
HGB BLD-MCNC: 9.9 G/DL (ref 12–15.5)
HOROWITZ INDEX BLD+IHG-RTO: ABNORMAL MM[HG]
INR PPP: 1.2
INR PPP: ABNORMAL
LACTATE BLDA-MCNC: 0.6 MMOL/L
LACTATE BLDA-MCNC: 0.7 MMOL/L
LACTATE BLDA-MCNC: 0.8 MMOL/L
LACTATE BLDA-MCNC: 1.6 MMOL/L
LARGE PLATELETS (PLTL): PRESENT
LDH SERPL L TO P-CCNC: 723 UNITS/L (ref 82–240)
LYMPHOCYTES # BLD: 2 K/MCL (ref 1–4.8)
LYMPHOCYTES NFR BLD: 10 %
MAGNESIUM SERPL-MCNC: 1.7 MG/DL (ref 1.7–2.4)
MAGNESIUM SERPL-MCNC: 1.9 MG/DL (ref 1.7–2.4)
MAJ XM SERPL-IMP: NORMAL
MCH RBC QN AUTO: 28.8 PG (ref 26–34)
MCHC RBC AUTO-ENTMCNC: 33 G/DL (ref 32–36.5)
MCV RBC AUTO: 87.4 FL (ref 78–100)
METAMYELOCYTES NFR BLD: 3 % (ref 0–2)
METHGB MFR BLD: 0.3 %
METHGB MFR BLD: 0.6 %
METHGB MFR BLD: 0.7 %
METHGB MFR BLD: 1 %
MONOCYTES # BLD: 1.1 K/MCL (ref 0.3–0.9)
MONOCYTES NFR BLD: 6 %
MYELOCYTES NFR BLD: 3 %
NEUTROPHILS # BLD: 12.8 K/MCL (ref 1.8–7.7)
NEUTS BAND NFR BLD: 1 % (ref 0–10)
NEUTS SEG NFR BLD: 68 %
NRBC (NRBCRE): 0 /100 WBC
NT-PROBNP SERPL-MCNC: 540 PG/ML
NUM BPU REQUESTED: 1
OXYHGB MFR BLD: 96.6 % (ref 94–98)
OXYHGB MFR BLD: 96.7 % (ref 94–98)
OXYHGB MFR BLD: 96.9 % (ref 94–98)
OXYHGB MFR BLDA: 98 % (ref 94–98)
PAO2 / FIO2 RATIO (RPFR): ABNORMAL
PATH REV BLD -IMP: ABNORMAL
PCO2 BLDA: 42 MM HG (ref 32–45)
PCO2 BLDA: 43 MM HG (ref 32–45)
PCO2 BLDA: 45 MM HG (ref 32–45)
PCO2 BLDA: 45 MM HG (ref 32–45)
PH BLDA: 7.38 UNITS (ref 7.35–7.45)
PH BLDA: 7.4 UNITS (ref 7.35–7.45)
PH BLDA: 7.41 UNITS (ref 7.35–7.45)
PH BLDA: 7.44 UNITS (ref 7.35–7.45)
PHOSPHATE SERPL-MCNC: 2.9 MG/DL (ref 2.4–4.7)
PLATELET # BLD: 156 K/MCL (ref 140–450)
PO2 BLDA: 105 MM HG (ref 83–108)
PO2 BLDA: 105 MM HG (ref 83–108)
PO2 BLDA: 133 MM HG (ref 83–108)
PO2 BLDA: 244 MM HG (ref 83–108)
POTASSIUM BLD-SCNC: 3.3 MMOL/L (ref 3.4–5.1)
POTASSIUM BLD-SCNC: 3.3 MMOL/L (ref 3.4–5.1)
POTASSIUM BLD-SCNC: 3.8 MMOL/L (ref 3.4–5.1)
POTASSIUM BLD-SCNC: 4.5 MMOL/L (ref 3.4–5.1)
POTASSIUM SERPL-SCNC: 3.2 MMOL/L (ref 3.4–5.1)
POTASSIUM SERPL-SCNC: 3.4 MMOL/L (ref 3.4–5.1)
PROCALCITONIN SERPL IA-MCNC: 2.73 NG/ML
PROCALCITONIN SERPL IA-MCNC: ABNORMAL NG/ML
PROT SERPL-MCNC: 8.3 G/DL (ref 6.4–8.2)
PROTHROMBIN TIME (PRT2): ABNORMAL
PROTHROMBIN TIME: 12.7 SEC (ref 9.7–11.8)
RBC # BLD: 3.26 MIL/MCL (ref 4–5.2)
RBC MORPH BLD: NORMAL
SAO2 % BLDA: 100 %
SAO2 % BLDA: 99 % (ref 95–99)
SERVICE CMNT-IMP: NORMAL
SODIUM BLD-SCNC: 136 MMOL/L (ref 135–145)
SODIUM BLD-SCNC: 136 MMOL/L (ref 135–145)
SODIUM BLD-SCNC: 137 MMOL/L (ref 135–145)
SODIUM BLD-SCNC: 139 MMOL/L (ref 135–145)
SODIUM SERPL-SCNC: 136 MMOL/L (ref 135–145)
STATUS OF UNIT: NORMAL
TRANSFUSION STATUS: NORMAL
UNIT DIVISION: 0
VARIANT LYMPHS NFR BLD: 1 % (ref 0–5)
WBC # BLD: 18.5 K/MCL (ref 4.2–11)
WBC MORPH BLD: NORMAL

## 2020-07-18 PROCEDURE — 99232 SBSQ HOSP IP/OBS MODERATE 35: CPT | Performed by: INTERNAL MEDICINE

## 2020-07-18 PROCEDURE — 99233 SBSQ HOSP IP/OBS HIGH 50: CPT | Performed by: INTERNAL MEDICINE

## 2020-07-19 LAB
ACANTHOCYTES (ACANT): ABNORMAL
ALBUMIN SERPL-MCNC: 4.4 G/DL (ref 3.6–5.1)
ALBUMIN/GLOB SERPL: 1.2 {RATIO} (ref 1–2.4)
ALP SERPL-CCNC: 82 UNITS/L (ref 45–117)
ALT SERPL-CCNC: 106 UNITS/L
ANALYZER ANC (IANC): ABNORMAL
ANION GAP SERPL CALC-SCNC: 15 MMOL/L (ref 10–20)
APTT PPP: 34 SEC (ref 22–32)
APTT PPP: ABNORMAL S
APTT PPP: ABNORMAL S
AST SERPL-CCNC: 88 UNITS/L
BASE DEFICIT BLDA-SCNC: ABNORMAL MMOL/L
BASE EXCESS BLDA CALC-SCNC: 1 MMOL/L (ref 0–3)
BASE EXCESS BLDA CALC-SCNC: 1 MMOL/L (ref 0–3)
BASE EXCESS BLDA CALC-SCNC: 2 MMOL/L (ref 0–3)
BASE EXCESS BLDA CALC-SCNC: 2 MMOL/L (ref 0–3)
BASE EXCESS BLDA CALC-SCNC: 3 MMOL/L (ref 0–3)
BASOPHILS # BLD: 0 K/MCL (ref 0–0.3)
BASOPHILS NFR BLD: 0 %
BDY SITE: ABNORMAL
BILIRUB SERPL-MCNC: 1 MG/DL (ref 0.2–1)
BODY TEMPERATURE: 36.8 DEGREES
BODY TEMPERATURE: 37 DEGREES
BODY TEMPERATURE: 37.4 DEGREES
BODY TEMPERATURE: 37.5 DEGREES
BUN SERPL-MCNC: 42 MG/DL (ref 6–20)
BUN/CREAT SERPL: 25 (ref 7–25)
CA-I BLD ISE-SCNC: 1.24 MMOL/L (ref 1.15–1.29)
CA-I BLD ISE-SCNC: 1.25 MMOL/L (ref 1.15–1.29)
CA-I BLD ISE-SCNC: 1.25 MMOL/L (ref 1.15–1.29)
CA-I BLD ISE-SCNC: 1.3 MMOL/L (ref 1.15–1.29)
CA-I BLD ISE-SCNC: 1.31 MMOL/L (ref 1.15–1.29)
CA-I BLDA-SCNC: 14 % (ref 15–23)
CA-I BLDA-SCNC: 15 ML/DL (ref 15–23)
CALCIUM SERPL-MCNC: 9.7 MG/DL (ref 8.4–10.2)
CHLORIDE SERPL-SCNC: 97 MMOL/L (ref 98–107)
CK SERPL-CCNC: 44 UNITS/L (ref 26–192)
CO2 SERPL-SCNC: 26 MMOL/L (ref 21–32)
COHGB MFR BLD: 1.2 %
COHGB MFR BLD: 1.3 %
COHGB MFR BLD: 1.3 %
COHGB MFR BLD: 1.5 %
COHGB MFR BLD: 1.6 %
CONDITION, POC: ABNORMAL
CONDITION-RC: ABNORMAL
CONDITION: ABNORMAL
CREAT SERPL-MCNC: 1.68 MG/DL (ref 0.51–0.95)
CRP SERPL-MCNC: 4.4 MG/DL
D DIMER PPP FEU-MCNC: >35.2 MG/L (FEU)
D DIMER PPP FEU-MCNC: ABNORMAL
DIFFERENTIAL METHOD BLD: ABNORMAL
EOSINOPHIL # BLD: 0 K/MCL (ref 0.1–0.5)
EOSINOPHIL NFR BLD: 0 %
ERYTHROCYTE [DISTWIDTH] IN BLOOD: 14.6 % (ref 11–15)
FERRITIN SERPL-MCNC: 4625 NG/ML (ref 8–252)
FIBRINOGEN PPP-MCNC: 191 MG/DL (ref 190–425)
GLOBULIN SER-MCNC: 3.8 G/DL (ref 2–4)
GLUCOSE BLD-MCNC: 127 MG/DL (ref 70–99)
GLUCOSE BLDC GLUCOMTR-MCNC: 119 MG/DL (ref 70–99)
GLUCOSE BLDC GLUCOMTR-MCNC: 156 MG/DL (ref 70–99)
GLUCOSE SERPL-MCNC: 110 MG/DL (ref 65–99)
HCO3 BLDA-SCNC: 26 MMOL/L (ref 22–28)
HCO3 BLDA-SCNC: 27 MMOL/L (ref 22–28)
HCT VFR BLD CALC: 29.1 % (ref 36–46.5)
HCT VFR BLD CALC: 29.2 % (ref 36–46.5)
HCT VFR BLD CALC: ABNORMAL %
HGB BLD-MCNC: 10.1 G/DL (ref 12–15.5)
HGB BLD-MCNC: 10.3 G/DL (ref 12–15.5)
HGB BLD-MCNC: 10.4 G/DL (ref 12–15.5)
HGB BLD-MCNC: 9.5 G/DL (ref 12–15.5)
HGB BLD-MCNC: 9.7 G/DL (ref 12–15.5)
HGB BLD-MCNC: 9.9 G/DL (ref 12–15.5)
HGB BLD-MCNC: 9.9 G/DL (ref 12–15.5)
HOROWITZ INDEX BLD+IHG-RTO: ABNORMAL MM[HG]
INR PPP: 1.2
INR PPP: ABNORMAL
LACTATE BLDA-MCNC: 0.6 MMOL/L
LACTATE BLDA-MCNC: 0.7 MMOL/L
LACTATE BLDA-MCNC: 0.9 MMOL/L
LACTATE BLDA-MCNC: 1 MMOL/L
LACTATE BLDA-MCNC: 1 MMOL/L
LARGE PLATELETS (PLTL): PRESENT
LDH SERPL L TO P-CCNC: 700 UNITS/L (ref 82–240)
LYMPHOCYTES # BLD: 0.9 K/MCL (ref 1–4.8)
LYMPHOCYTES NFR BLD: 5 %
MAGNESIUM SERPL-MCNC: 1.8 MG/DL (ref 1.7–2.4)
MAGNESIUM SERPL-MCNC: 2.2 MG/DL (ref 1.7–2.4)
MAGNESIUM SERPL-MCNC: 2.4 MG/DL (ref 1.7–2.4)
MCH RBC QN AUTO: 28.9 PG (ref 26–34)
MCHC RBC AUTO-ENTMCNC: 32.5 G/DL (ref 32–36.5)
MCV RBC AUTO: 88.8 FL (ref 78–100)
METAMYELOCYTES NFR BLD: 3 % (ref 0–2)
METHGB MFR BLD: 0.4 %
METHGB MFR BLD: 0.5 %
METHGB MFR BLD: 0.8 %
METHGB MFR BLD: 0.9 %
METHGB MFR BLD: 1 %
MONOCYTES # BLD: 1 K/MCL (ref 0.3–0.9)
MONOCYTES NFR BLD: 6 %
MYELOCYTES NFR BLD: 1 %
NEUTROPHILS # BLD: 14.5 K/MCL (ref 1.8–7.7)
NEUTS BAND NFR BLD: 5 % (ref 0–10)
NEUTS SEG NFR BLD: 80 %
NRBC (NRBCRE): 0 /100 WBC
NT-PROBNP SERPL-MCNC: 987 PG/ML
OXYHGB MFR BLD: 95.2 % (ref 94–98)
OXYHGB MFR BLD: 96 % (ref 94–98)
OXYHGB MFR BLD: 96.4 % (ref 94–98)
OXYHGB MFR BLD: 97.2 % (ref 94–98)
OXYHGB MFR BLDA: 97.8 % (ref 94–98)
PAO2 / FIO2 RATIO (RPFR): ABNORMAL
PATH REV BLD -IMP: ABNORMAL
PCO2 BLDA: 40 MM HG (ref 32–45)
PCO2 BLDA: 42 MM HG (ref 32–45)
PCO2 BLDA: 42 MM HG (ref 32–45)
PCO2 BLDA: 43 MM HG (ref 32–45)
PCO2 BLDA: 45 MM HG (ref 32–45)
PH BLDA: 7.38 UNITS (ref 7.35–7.45)
PH BLDA: 7.4 UNITS (ref 7.35–7.45)
PH BLDA: 7.41 UNITS (ref 7.35–7.45)
PH BLDA: 7.42 UNITS (ref 7.35–7.45)
PH BLDA: 7.43 UNITS (ref 7.35–7.45)
PHOSPHATE SERPL-MCNC: 3 MG/DL (ref 2.4–4.7)
PLATELET # BLD: 149 K/MCL (ref 140–450)
PO2 BLDA: 117 MM HG (ref 83–108)
PO2 BLDA: 351 MM HG (ref 83–108)
PO2 BLDA: 76 MM HG (ref 83–108)
PO2 BLDA: 85 MM HG (ref 83–108)
PO2 BLDA: 99 MM HG (ref 83–108)
POTASSIUM BLD-SCNC: 3 MMOL/L (ref 3.4–5.1)
POTASSIUM BLD-SCNC: 3.3 MMOL/L (ref 3.4–5.1)
POTASSIUM BLD-SCNC: 3.3 MMOL/L (ref 3.4–5.1)
POTASSIUM BLD-SCNC: 3.5 MMOL/L (ref 3.4–5.1)
POTASSIUM BLD-SCNC: 3.7 MMOL/L (ref 3.4–5.1)
POTASSIUM SERPL-SCNC: 2.9 MMOL/L (ref 3.4–5.1)
POTASSIUM SERPL-SCNC: 3.2 MMOL/L (ref 3.4–5.1)
POTASSIUM SERPL-SCNC: 3.3 MMOL/L (ref 3.4–5.1)
POTASSIUM SERPL-SCNC: 3.4 MMOL/L (ref 3.4–5.1)
PROCALCITONIN SERPL IA-MCNC: 1.77 NG/ML
PROCALCITONIN SERPL IA-MCNC: ABNORMAL NG/ML
PROT SERPL-MCNC: 8.2 G/DL (ref 6.4–8.2)
PROTHROMBIN TIME (PRT2): ABNORMAL
PROTHROMBIN TIME: 12.3 SEC (ref 9.7–11.8)
RBC # BLD: 3.29 MIL/MCL (ref 4–5.2)
SAO2 % BLDA: 100 %
SAO2 % BLDA: 97 % (ref 95–99)
SAO2 % BLDA: 98 % (ref 95–99)
SAO2 % BLDA: 99 % (ref 95–99)
SAO2 % BLDA: 99 % (ref 95–99)
SERVICE CMNT-IMP: NORMAL
SODIUM BLD-SCNC: 136 MMOL/L (ref 135–145)
SODIUM BLD-SCNC: 137 MMOL/L (ref 135–145)
SODIUM BLD-SCNC: 138 MMOL/L (ref 135–145)
SODIUM BLD-SCNC: 139 MMOL/L (ref 135–145)
SODIUM BLD-SCNC: 140 MMOL/L (ref 135–145)
SODIUM SERPL-SCNC: 135 MMOL/L (ref 135–145)
WBC # BLD: 17.1 K/MCL (ref 4.2–11)
WBC MORPH BLD: NORMAL

## 2020-07-19 PROCEDURE — 99233 SBSQ HOSP IP/OBS HIGH 50: CPT | Performed by: INTERNAL MEDICINE

## 2020-07-20 LAB
ACANTHOCYTES (ACANT): ABNORMAL
ALBUMIN SERPL-MCNC: 4.3 G/DL (ref 3.6–5.1)
ALBUMIN SERPL-MCNC: 4.6 G/DL (ref 3.6–5.1)
ALBUMIN/GLOB SERPL: 1 {RATIO} (ref 1–2.4)
ALBUMIN/GLOB SERPL: 1.1 {RATIO} (ref 1–2.4)
ALP SERPL-CCNC: 89 UNITS/L (ref 45–117)
ALP SERPL-CCNC: 94 UNITS/L (ref 45–117)
ALT SERPL-CCNC: 129 UNITS/L
ALT SERPL-CCNC: 145 UNITS/L
ANALYZER ANC (IANC): ABNORMAL
ANION GAP SERPL CALC-SCNC: 14 MMOL/L (ref 10–20)
ANION GAP SERPL CALC-SCNC: 19 MMOL/L (ref 10–20)
APTT PPP: 34 SEC (ref 22–32)
APTT PPP: ABNORMAL S
APTT PPP: ABNORMAL S
AST SERPL-CCNC: 107 UNITS/L
AST SERPL-CCNC: 125 UNITS/L
BACTERIA BLD CULT: NORMAL
BASE DEFICIT BLDA-SCNC: ABNORMAL MMOL/L
BASE EXCESS BLDA CALC-SCNC: 0 MMOL/L (ref 0–3)
BASE EXCESS BLDA CALC-SCNC: 2 MMOL/L (ref 0–3)
BASE EXCESS BLDA CALC-SCNC: 2 MMOL/L (ref 0–3)
BASE EXCESS BLDA CALC-SCNC: 3 MMOL/L (ref 0–3)
BASOPHILS # BLD: 0 K/MCL (ref 0–0.3)
BASOPHILS NFR BLD: 0 %
BDY SITE: ABNORMAL
BILIRUB SERPL-MCNC: 1.1 MG/DL (ref 0.2–1)
BILIRUB SERPL-MCNC: 1.3 MG/DL (ref 0.2–1)
BODY TEMPERATURE: 36.7 DEGREES
BODY TEMPERATURE: 36.8 DEGREES
BODY TEMPERATURE: 37 DEGREES
BUN SERPL-MCNC: 58 MG/DL (ref 6–20)
BUN SERPL-MCNC: 66 MG/DL (ref 6–20)
BUN/CREAT SERPL: 31 (ref 7–25)
BUN/CREAT SERPL: 35 (ref 7–25)
CA-I BLD ISE-SCNC: 1.17 MMOL/L (ref 1.15–1.29)
CA-I BLD ISE-SCNC: 1.23 MMOL/L (ref 1.15–1.29)
CA-I BLD ISE-SCNC: 1.26 MMOL/L (ref 1.15–1.29)
CA-I BLD ISE-SCNC: 1.26 MMOL/L (ref 1.15–1.29)
CA-I BLDA-SCNC: 14 % (ref 15–23)
CA-I BLDA-SCNC: 14 % (ref 15–23)
CA-I BLDA-SCNC: 15 % (ref 15–23)
CA-I BLDA-SCNC: 16 ML/DL (ref 15–23)
CALCIUM SERPL-MCNC: 10 MG/DL (ref 8.4–10.2)
CALCIUM SERPL-MCNC: 9.8 MG/DL (ref 8.4–10.2)
CHLORIDE SERPL-SCNC: 101 MMOL/L (ref 98–107)
CHLORIDE SERPL-SCNC: 98 MMOL/L (ref 98–107)
CK SERPL-CCNC: 52 UNITS/L (ref 26–192)
CO2 SERPL-SCNC: 25 MMOL/L (ref 21–32)
CO2 SERPL-SCNC: 26 MMOL/L (ref 21–32)
COHGB MFR BLD: 0.9 %
COHGB MFR BLD: 1.2 %
COHGB MFR BLD: 1.5 %
COHGB MFR BLD: 1.9 %
CONDITION, POC: ABNORMAL
CONDITION-RC: ABNORMAL
CONDITION: ABNORMAL
CREAT SERPL-MCNC: 1.87 MG/DL (ref 0.51–0.95)
CREAT SERPL-MCNC: 1.87 MG/DL (ref 0.51–0.95)
CRP SERPL-MCNC: 3.8 MG/DL
D DIMER PPP FEU-MCNC: >35.2 MG/L (FEU)
D DIMER PPP FEU-MCNC: ABNORMAL
DIFFERENTIAL METHOD BLD: ABNORMAL
EOSINOPHIL # BLD: 0.4 K/MCL (ref 0.1–0.5)
EOSINOPHIL NFR BLD: 2 %
ERYTHROCYTE [DISTWIDTH] IN BLOOD: 14.8 % (ref 11–15)
FERRITIN SERPL-MCNC: 5922 NG/ML (ref 8–252)
FIBRINOGEN PPP-MCNC: 187 MG/DL (ref 190–425)
GLOBULIN SER-MCNC: 4.2 G/DL (ref 2–4)
GLOBULIN SER-MCNC: 4.2 G/DL (ref 2–4)
GLUCOSE BLDC GLUCOMTR-MCNC: 101 MG/DL (ref 70–99)
GLUCOSE BLDC GLUCOMTR-MCNC: 102 MG/DL (ref 70–99)
GLUCOSE BLDC GLUCOMTR-MCNC: 115 MG/DL (ref 70–99)
GLUCOSE SERPL-MCNC: 101 MG/DL (ref 65–99)
GLUCOSE SERPL-MCNC: 138 MG/DL (ref 65–99)
HCO3 BLDA-SCNC: 26 MMOL/L (ref 22–28)
HCO3 BLDA-SCNC: 26 MMOL/L (ref 22–28)
HCO3 BLDA-SCNC: 27 MMOL/L (ref 22–28)
HCO3 BLDA-SCNC: 27 MMOL/L (ref 22–28)
HCT VFR BLD CALC: 29.5 % (ref 36–46.5)
HCT VFR BLD CALC: ABNORMAL %
HGB BLD-MCNC: 10.2 G/DL (ref 12–15.5)
HGB BLD-MCNC: 10.2 G/DL (ref 12–15.5)
HGB BLD-MCNC: 10.7 G/DL (ref 12–15.5)
HGB BLD-MCNC: 10.8 G/DL (ref 12–15.5)
HGB BLD-MCNC: 9.8 G/DL (ref 12–15.5)
HOROWITZ INDEX BLD+IHG-RTO: ABNORMAL MM[HG]
INR PPP: 1.2
INR PPP: ABNORMAL
LACTATE BLDA-MCNC: 0.8 MMOL/L
LACTATE BLDA-MCNC: 1 MMOL/L
LACTATE BLDA-MCNC: 1.4 MMOL/L
LDH SERPL L TO P-CCNC: 781 UNITS/L (ref 82–240)
LYMPHOCYTES # BLD: 1.3 K/MCL (ref 1–4.8)
LYMPHOCYTES NFR BLD: 5 %
MAGNESIUM SERPL-MCNC: 2 MG/DL (ref 1.7–2.4)
MAGNESIUM SERPL-MCNC: 2.1 MG/DL (ref 1.7–2.4)
MCH RBC QN AUTO: 29.3 PG (ref 26–34)
MCHC RBC AUTO-ENTMCNC: 33.2 G/DL (ref 32–36.5)
MCV RBC AUTO: 88.1 FL (ref 78–100)
METHGB MFR BLD: 0.4 %
METHGB MFR BLD: 0.6 %
METHGB MFR BLD: 0.6 %
METHGB MFR BLD: 0.8 %
MONOCYTES # BLD: 1.1 K/MCL (ref 0.3–0.9)
MONOCYTES NFR BLD: 6 %
MYELOCYTES NFR BLD: 4 %
NEUTROPHILS # BLD: 14.6 K/MCL (ref 1.8–7.7)
NEUTS BAND NFR BLD: 1 % (ref 0–10)
NEUTS SEG NFR BLD: 80 %
NRBC (NRBCRE): 0 /100 WBC
NT-PROBNP SERPL-MCNC: 945 PG/ML
OXYHGB MFR BLD: 97.4 % (ref 94–98)
OXYHGB MFR BLD: 97.4 % (ref 94–98)
OXYHGB MFR BLD: 97.5 % (ref 94–98)
OXYHGB MFR BLDA: 97.5 % (ref 94–98)
PAO2 / FIO2 RATIO (RPFR): ABNORMAL
PATH REV BLD -IMP: ABNORMAL
PCO2 BLDA: 38 MM HG (ref 32–45)
PCO2 BLDA: 40 MM HG (ref 32–45)
PCO2 BLDA: 41 MM HG (ref 32–45)
PCO2 BLDA: 45 MM HG (ref 32–45)
PH BLDA: 7.37 UNITS (ref 7.35–7.45)
PH BLDA: 7.42 UNITS (ref 7.35–7.45)
PH BLDA: 7.43 UNITS (ref 7.35–7.45)
PH BLDA: 7.44 UNITS (ref 7.35–7.45)
PHOSPHATE SERPL-MCNC: 4.5 MG/DL (ref 2.4–4.7)
PHOSPHATE SERPL-MCNC: 6.1 MG/DL (ref 2.4–4.7)
PLAT MORPH BLD: NORMAL
PLATELET # BLD: 142 K/MCL (ref 140–450)
PO2 BLDA: 131 MM HG (ref 83–108)
PO2 BLDA: 136 MM HG (ref 83–108)
PO2 BLDA: 158 MM HG (ref 83–108)
PO2 BLDA: 427 MM HG (ref 83–108)
POTASSIUM BLD-SCNC: 3.1 MMOL/L (ref 3.4–5.1)
POTASSIUM BLD-SCNC: 3.3 MMOL/L (ref 3.4–5.1)
POTASSIUM BLD-SCNC: 3.5 MMOL/L (ref 3.4–5.1)
POTASSIUM BLD-SCNC: 3.5 MMOL/L (ref 3.4–5.1)
POTASSIUM SERPL-SCNC: 3.2 MMOL/L (ref 3.4–5.1)
POTASSIUM SERPL-SCNC: 3.2 MMOL/L (ref 3.4–5.1)
PROCALCITONIN SERPL IA-MCNC: 1.76 NG/ML
PROCALCITONIN SERPL IA-MCNC: ABNORMAL NG/ML
PROT SERPL-MCNC: 8.5 G/DL (ref 6.4–8.2)
PROT SERPL-MCNC: 8.8 G/DL (ref 6.4–8.2)
PROTHROMBIN TIME (PRT2): ABNORMAL
PROTHROMBIN TIME: 13 SEC (ref 9.7–11.8)
RBC # BLD: 3.35 MIL/MCL (ref 4–5.2)
SAO2 % BLDA: 100 %
SAO2 % BLDA: 99 % (ref 95–99)
SERVICE CMNT-IMP: ABNORMAL
SODIUM BLD-SCNC: 138 MMOL/L (ref 135–145)
SODIUM BLD-SCNC: 139 MMOL/L (ref 135–145)
SODIUM BLD-SCNC: 142 MMOL/L (ref 135–145)
SODIUM BLD-SCNC: 143 MMOL/L (ref 135–145)
SODIUM SERPL-SCNC: 138 MMOL/L (ref 135–145)
SODIUM SERPL-SCNC: 139 MMOL/L (ref 135–145)
VARIANT LYMPHS NFR BLD: 2 % (ref 0–5)
WBC # BLD: 18 K/MCL (ref 4.2–11)
WBC MORPH BLD: NORMAL

## 2020-07-20 PROCEDURE — 99232 SBSQ HOSP IP/OBS MODERATE 35: CPT | Performed by: INTERNAL MEDICINE

## 2020-07-20 PROCEDURE — 99233 SBSQ HOSP IP/OBS HIGH 50: CPT | Performed by: INTERNAL MEDICINE

## 2020-07-20 PROCEDURE — 93306 TTE W/DOPPLER COMPLETE: CPT | Performed by: INTERNAL MEDICINE

## 2020-07-21 LAB
ABO + RH BLD: NORMAL
ABO + RH BLD: NORMAL
ALBUMIN SERPL-MCNC: 4.3 G/DL (ref 3.6–5.1)
ALBUMIN/GLOB SERPL: 1 {RATIO} (ref 1–2.4)
ALP SERPL-CCNC: 92 UNITS/L (ref 45–117)
ALT SERPL-CCNC: 154 UNITS/L
ANALYZER ANC (IANC): ABNORMAL
ANION GAP SERPL CALC-SCNC: 16 MMOL/L (ref 10–20)
ANION GAP SERPL CALC-SCNC: 17 MMOL/L (ref 10–20)
APTT PPP: 34 SEC (ref 22–32)
APTT PPP: ABNORMAL S
APTT PPP: ABNORMAL S
AST SERPL-CCNC: 135 UNITS/L
B-HCG UR QL: NEGATIVE
BASE DEFICIT BLDA-SCNC: 2 MMOL/L (ref 0–2)
BASE DEFICIT BLDA-SCNC: 4 MMOL/L (ref 0–2)
BASE DEFICIT BLDA-SCNC: ABNORMAL MMOL/L
BASE EXCESS BLDA CALC-SCNC: 3 MMOL/L (ref 0–3)
BASE EXCESS BLDA CALC-SCNC: ABNORMAL MMOL/L
BASE EXCESS BLDA CALC-SCNC: ABNORMAL MMOL/L
BASOPHILS # BLD: 0 K/MCL (ref 0–0.3)
BASOPHILS NFR BLD: 0 %
BDY SITE: ABNORMAL
BILIRUB SERPL-MCNC: 1.2 MG/DL (ref 0.2–1)
BLD GP AB SCN SERPL QL: NEGATIVE
BLD GP AB SCN SERPL QL: NEGATIVE
BLOOD BANK CMNT PATIENT-IMP: NORMAL
BODY TEMPERATURE: 36.7 DEGREES
BODY TEMPERATURE: 36.9 DEGREES
BUN SERPL-MCNC: 75 MG/DL (ref 6–20)
BUN SERPL-MCNC: 85 MG/DL (ref 6–20)
BUN/CREAT SERPL: 36 (ref 7–25)
BUN/CREAT SERPL: 37 (ref 7–25)
CA-I BLD ISE-SCNC: 1.18 MMOL/L (ref 1.15–1.29)
CA-I BLD ISE-SCNC: 1.2 MMOL/L (ref 1.15–1.29)
CA-I BLD ISE-SCNC: 1.24 MMOL/L (ref 1.15–1.29)
CA-I BLDA-SCNC: 13 % (ref 15–23)
CA-I BLDA-SCNC: 14 ML/DL (ref 15–23)
CA-I BLDA-SCNC: 16 % (ref 15–23)
CALCIUM SERPL-MCNC: 9.5 MG/DL (ref 8.4–10.2)
CALCIUM SERPL-MCNC: 9.8 MG/DL (ref 8.4–10.2)
CHLORIDE SERPL-SCNC: 102 MMOL/L (ref 98–107)
CHLORIDE SERPL-SCNC: 105 MMOL/L (ref 98–107)
CK SERPL-CCNC: 53 UNITS/L (ref 26–192)
CO2 SERPL-SCNC: 23 MMOL/L (ref 21–32)
CO2 SERPL-SCNC: 26 MMOL/L (ref 21–32)
COHGB MFR BLD: 0.8 %
COHGB MFR BLD: 0.8 %
COHGB MFR BLD: 0.9 %
CONDITION-RC: ABNORMAL
CONDITION-RC: ABNORMAL
CONDITION: ABNORMAL
CONDITION: ABNORMAL
CREAT SERPL-MCNC: 2.02 MG/DL (ref 0.51–0.95)
CREAT SERPL-MCNC: 2.34 MG/DL (ref 0.51–0.95)
CROSSMATCH EXPIRE: NORMAL
CROSSMATCH EXPIRE: NORMAL
CRP SERPL-MCNC: 4.3 MG/DL
D DIMER PPP FEU-MCNC: >35.2 MG/L (FEU)
D DIMER PPP FEU-MCNC: ABNORMAL
DIFFERENTIAL METHOD BLD: ABNORMAL
EOSINOPHIL # BLD: 0.2 K/MCL (ref 0.1–0.5)
EOSINOPHIL NFR BLD: 1 %
ERYTHROCYTE [DISTWIDTH] IN BLOOD: 14.6 % (ref 11–15)
FERRITIN SERPL-MCNC: 6946 NG/ML (ref 8–252)
FIBRINOGEN PPP-MCNC: 151 MG/DL (ref 190–425)
GLOBULIN SER-MCNC: 4.2 G/DL (ref 2–4)
GLUCOSE BLDC GLUCOMTR-MCNC: 127 MG/DL (ref 70–99)
GLUCOSE BLDC GLUCOMTR-MCNC: 135 MG/DL (ref 70–99)
GLUCOSE BLDC GLUCOMTR-MCNC: 150 MG/DL (ref 70–99)
GLUCOSE BLDC GLUCOMTR-MCNC: ABNORMAL MG/DL
GLUCOSE SERPL-MCNC: 122 MG/DL (ref 65–99)
GLUCOSE SERPL-MCNC: 147 MG/DL (ref 65–99)
HCG SERPL QL: NEGATIVE
HCO3 BLDA-SCNC: 21 MMOL/L (ref 22–28)
HCO3 BLDA-SCNC: 24 MMOL/L (ref 22–28)
HCO3 BLDA-SCNC: 28 MMOL/L (ref 22–28)
HCT VFR BLD CALC: 29.8 % (ref 36–46.5)
HCT VFR BLD CALC: ABNORMAL %
HGB BLD-MCNC: 11.3 G/DL (ref 12–15.5)
HGB BLD-MCNC: 9.6 G/DL (ref 12–15.5)
HGB BLD-MCNC: 9.7 G/DL (ref 12–15.5)
HGB BLD-MCNC: 9.7 G/DL (ref 12–15.5)
HOROWITZ INDEX BLD+IHG-RTO: ABNORMAL MM[HG]
HOROWITZ INDEX BLD+IHG-RTO: ABNORMAL MM[HG]
HYPOCHROMIA (HYPOC): ABNORMAL
INR PPP: 1.4
INR PPP: ABNORMAL
ISOLATION GUIDELINES: NORMAL
LACTATE BLDA-MCNC: 1.3 MMOL/L
LACTATE BLDA-MCNC: 5.9 MMOL/L
LACTATE BLDV-SCNC: 7.4 MMOL/L (ref 0–2)
LACTATE BLDV-SCNC: ABNORMAL MMOL/L
LDH SERPL L TO P-CCNC: 904 UNITS/L (ref 82–240)
LYMPHOCYTES # BLD: 0.7 K/MCL (ref 1–4.8)
LYMPHOCYTES NFR BLD: 2 %
MAGNESIUM SERPL-MCNC: 3.4 MG/DL (ref 1.7–2.4)
MCH RBC QN AUTO: 28.3 PG (ref 26–34)
MCHC RBC AUTO-ENTMCNC: 32.6 G/DL (ref 32–36.5)
MCV RBC AUTO: 86.9 FL (ref 78–100)
METAMYELOCYTES NFR BLD: 4 % (ref 0–2)
METHGB MFR BLD: 1 %
METHGB MFR BLD: 2 %
METHGB MFR BLD: 2.8 %
MONOCYTES # BLD: 0.7 K/MCL (ref 0.3–0.9)
MONOCYTES NFR BLD: 3 %
MYELOCYTES NFR BLD: 1 %
NEUTROPHILS # BLD: 20.7 K/MCL (ref 1.8–7.7)
NEUTS BAND NFR BLD: 4 % (ref 0–10)
NEUTS SEG NFR BLD: 84 %
NRBC (NRBCRE): 0 /100 WBC
NT-PROBNP SERPL-MCNC: 597 PG/ML
NUM BPU REQUESTED: 2
NUM BPU REQUESTED: 4
NUM BPU REQUESTED: 4
OXYHGB MFR BLD: 95.7 % (ref 94–98)
OXYHGB MFR BLD: 97.8 % (ref 94–98)
OXYHGB MFR BLDA: 95.4 % (ref 94–98)
PAO2 / FIO2 RATIO (RPFR): ABNORMAL
PAO2 / FIO2 RATIO (RPFR): ABNORMAL
PATH REV BLD -IMP: ABNORMAL
PCO2 BLDA: 36 MM HG (ref 32–45)
PCO2 BLDA: 42 MM HG (ref 32–45)
PCO2 BLDA: 42 MM HG (ref 32–45)
PH BLDA: 7.36 UNITS (ref 7.35–7.45)
PH BLDA: 7.37 UNITS (ref 7.35–7.45)
PH BLDA: 7.43 UNITS (ref 7.35–7.45)
PHOSPHATE SERPL-MCNC: 6.1 MG/DL (ref 2.4–4.7)
PLAT MORPH BLD: NORMAL
PLATELET # BLD: 131 K/MCL (ref 140–450)
PO2 BLDA: 173 MM HG (ref 83–108)
PO2 BLDA: 176 MM HG (ref 83–108)
PO2 BLDA: 373 MM HG (ref 83–108)
POLYCHROMASIA (POLY): ABNORMAL
POTASSIUM BLD-SCNC: 2.8 MMOL/L (ref 3.4–5.1)
POTASSIUM BLD-SCNC: 4.9 MMOL/L (ref 3.4–5.1)
POTASSIUM BLD-SCNC: 5.1 MMOL/L (ref 3.4–5.1)
POTASSIUM SERPL-SCNC: 3.1 MMOL/L (ref 3.4–5.1)
POTASSIUM SERPL-SCNC: 3.4 MMOL/L (ref 3.4–5.1)
POTASSIUM SERPL-SCNC: 4.2 MMOL/L (ref 3.4–5.1)
POTASSIUM SERPL-SCNC: 4.8 MMOL/L (ref 3.4–5.1)
PROT SERPL-MCNC: 8.5 G/DL (ref 6.4–8.2)
PROTHROMBIN TIME (PRT2): ABNORMAL
PROTHROMBIN TIME: 13.9 SEC (ref 9.7–11.8)
RBC # BLD: 3.43 MIL/MCL (ref 4–5.2)
SAO2 % BLDA: 100 % (ref 95–99)
SAO2 % BLDA: 99 %
SAO2 % BLDA: 99 % (ref 95–99)
SARS-COV-2 RNA RESP QL NAA+PROBE: NOT DETECTED
SERVICE CMNT-IMP: ABNORMAL
SERVICE CMNT-IMP: NORMAL
SODIUM BLD-SCNC: 142 MMOL/L (ref 135–145)
SODIUM BLD-SCNC: 142 MMOL/L (ref 135–145)
SODIUM BLD-SCNC: 143 MMOL/L (ref 135–145)
SODIUM SERPL-SCNC: 141 MMOL/L (ref 135–145)
SODIUM SERPL-SCNC: 141 MMOL/L (ref 135–145)
SPECIMEN SOURCE: NORMAL
VARIANT LYMPHS NFR BLD: 1 % (ref 0–5)
WBC # BLD: 23.5 K/MCL (ref 4.2–11)
WBC MORPH BLD: NORMAL

## 2020-07-21 PROCEDURE — 99232 SBSQ HOSP IP/OBS MODERATE 35: CPT | Performed by: INTERNAL MEDICINE

## 2020-07-21 PROCEDURE — 99291 CRITICAL CARE FIRST HOUR: CPT | Performed by: INTERNAL MEDICINE

## 2020-07-21 PROCEDURE — 99254 IP/OBS CNSLTJ NEW/EST MOD 60: CPT | Performed by: SURGERY

## 2020-07-22 LAB
ALBUMIN SERPL-MCNC: 3.1 G/DL (ref 3.6–5.1)
ALBUMIN/GLOB SERPL: 0.9 {RATIO} (ref 1–2.4)
ALP SERPL-CCNC: 120 UNITS/L (ref 45–117)
ALT SERPL-CCNC: 564 UNITS/L
ANALYZER ANC (IANC): ABNORMAL
ANION GAP SERPL CALC-SCNC: 20 MMOL/L (ref 10–20)
APTT PPP: 62 SEC (ref 22–32)
APTT PPP: ABNORMAL S
APTT PPP: ABNORMAL S
AST SERPL-CCNC: 939 UNITS/L
BASE DEFICIT BLDA-SCNC: 10 MMOL/L (ref 0–2)
BASE DEFICIT BLDA-SCNC: 16 MMOL/L (ref 0–2)
BASE EXCESS BLDA CALC-SCNC: ABNORMAL MMOL/L
BASE EXCESS BLDA CALC-SCNC: ABNORMAL MMOL/L
BASOPHILS # BLD: 0 K/MCL (ref 0–0.3)
BASOPHILS NFR BLD: 0 %
BDY SITE: ABNORMAL
BDY SITE: ABNORMAL
BILIRUB SERPL-MCNC: 2.3 MG/DL (ref 0.2–1)
BODY TEMPERATURE: 36.2 DEGREES
BODY TEMPERATURE: 36.7 DEGREES
BUN SERPL-MCNC: 98 MG/DL (ref 6–20)
BUN/CREAT SERPL: 37 (ref 7–25)
BURR CELLS (BURC): ABNORMAL
CA-I BLD ISE-SCNC: 1.15 MMOL/L (ref 1.15–1.29)
CA-I BLD ISE-SCNC: 1.21 MMOL/L (ref 1.15–1.29)
CA-I BLDA-SCNC: 12 % (ref 15–23)
CA-I BLDA-SCNC: 12 % (ref 15–23)
CALCIUM SERPL-MCNC: 8.3 MG/DL (ref 8.4–10.2)
CHLORIDE SERPL-SCNC: 110 MMOL/L (ref 98–107)
CO2 SERPL-SCNC: 17 MMOL/L (ref 21–32)
COHGB MFR BLD: 0 %
COHGB MFR BLD: 0.1 %
CONDITION-RC: ABNORMAL
CONDITION-RC: ABNORMAL
CONDITION: ABNORMAL
CONDITION: ABNORMAL
CREAT SERPL-MCNC: 2.62 MG/DL (ref 0.51–0.95)
D DIMER PPP FEU-MCNC: >35.2 MG/L (FEU)
D DIMER PPP FEU-MCNC: ABNORMAL
DIFFERENTIAL METHOD BLD: ABNORMAL
EOSINOPHIL # BLD: 0 K/MCL (ref 0.1–0.5)
EOSINOPHIL NFR BLD: 0 %
ERYTHROCYTE [DISTWIDTH] IN BLOOD: 14.7 % (ref 11–15)
FERRITIN SERPL-MCNC: ABNORMAL NG/ML (ref 8–252)
FIBRINOGEN PPP-MCNC: 213 MG/DL (ref 190–425)
GLOBULIN SER-MCNC: 3.4 G/DL (ref 2–4)
GLUCOSE BLDC GLUCOMTR-MCNC: 144 MG/DL (ref 70–99)
GLUCOSE BLDC GLUCOMTR-MCNC: 156 MG/DL (ref 70–99)
GLUCOSE BLDC GLUCOMTR-MCNC: 160 MG/DL (ref 70–99)
GLUCOSE BLDC GLUCOMTR-MCNC: ABNORMAL MG/DL
GLUCOSE SERPL-MCNC: 103 MG/DL (ref 65–99)
HCO3 BLDA-SCNC: 15 MMOL/L (ref 22–28)
HCO3 BLDA-SCNC: 17 MMOL/L (ref 22–28)
HCT VFR BLD CALC: 26.2 % (ref 36–46.5)
HGB BLD-MCNC: 8 G/DL (ref 12–15.5)
HGB BLD-MCNC: 8.8 G/DL (ref 12–15.5)
HGB BLD-MCNC: 8.9 G/DL (ref 12–15.5)
HOROWITZ INDEX BLD+IHG-RTO: ABNORMAL MM[HG]
HOROWITZ INDEX BLD+IHG-RTO: ABNORMAL MM[HG]
INR PPP: 2.3
INR PPP: ABNORMAL
LACTATE BLDA-MCNC: 6.8 MMOL/L
LACTATE BLDA-MCNC: 8.7 MMOL/L
LARGE PLATELETS (PLTL): PRESENT
LDH SERPL L TO P-CCNC: 1744 UNITS/L (ref 82–240)
LYMPHOCYTES # BLD: 1.1 K/MCL (ref 1–4.8)
LYMPHOCYTES NFR BLD: 4 %
MAGNESIUM SERPL-MCNC: 6.4 MG/DL (ref 1.7–2.4)
MAGNESIUM SERPL-MCNC: ABNORMAL MG/DL
MCH RBC QN AUTO: 28.9 PG (ref 26–34)
MCHC RBC AUTO-ENTMCNC: 30.5 G/DL (ref 32–36.5)
MCV RBC AUTO: 94.6 FL (ref 78–100)
METAMYELOCYTES NFR BLD: 2 % (ref 0–2)
METHGB MFR BLD: 3.1 %
METHGB MFR BLD: 3.3 %
MONOCYTES # BLD: 0.5 K/MCL (ref 0.3–0.9)
MONOCYTES NFR BLD: 2 %
MYELOCYTES NFR BLD: 5 %
NEUTROPHILS # BLD: 23.4 K/MCL (ref 1.8–7.7)
NEUTS BAND NFR BLD: 19 % (ref 0–10)
NEUTS SEG NFR BLD: 68 %
NRBC (NRBCRE): 0 /100 WBC
NT-PROBNP SERPL-MCNC: 1525 PG/ML
OXYHGB MFR BLD: 93.3 % (ref 94–98)
OXYHGB MFR BLD: 93.6 % (ref 94–98)
PAO2 / FIO2 RATIO (RPFR): ABNORMAL
PAO2 / FIO2 RATIO (RPFR): ABNORMAL
PATH REV BLD -IMP: ABNORMAL
PCO2 BLDA: 40 MM HG (ref 32–45)
PCO2 BLDA: 59 MM HG (ref 32–45)
PH BLDA: 7.01 UNITS (ref 7.35–7.45)
PH BLDA: 7.22 UNITS (ref 7.35–7.45)
PHOSPHATE SERPL-MCNC: 8.9 MG/DL (ref 2.4–4.7)
PLATELET # BLD: 139 K/MCL (ref 140–450)
PO2 BLDA: 164 MM HG (ref 83–108)
PO2 BLDA: 178 MM HG (ref 83–108)
POLYCHROMASIA (POLY): ABNORMAL
POTASSIUM BLD-SCNC: 5 MMOL/L (ref 3.4–5.1)
POTASSIUM BLD-SCNC: 5.4 MMOL/L (ref 3.4–5.1)
POTASSIUM SERPL-SCNC: 5.3 MMOL/L (ref 3.4–5.1)
PROT SERPL-MCNC: 6.5 G/DL (ref 6.4–8.2)
PROTHROMBIN TIME (PRT2): ABNORMAL
PROTHROMBIN TIME: 23.3 SEC (ref 9.7–11.8)
RBC # BLD: 2.77 MIL/MCL (ref 4–5.2)
SAO2 % BLDA: 97 % (ref 95–99)
SAO2 % BLDA: 98 % (ref 95–99)
SERVICE CMNT-IMP: NORMAL
SHISTOCYTES (SHSTO): ABNORMAL
SODIUM BLD-SCNC: 140 MMOL/L (ref 135–145)
SODIUM BLD-SCNC: 141 MMOL/L (ref 135–145)
SODIUM SERPL-SCNC: 142 MMOL/L (ref 135–145)
TOXIC GRANULATION (TOXIC): PRESENT
WBC # BLD: 26.9 K/MCL (ref 4.2–11)
WBC MORPH BLD: NORMAL

## 2020-07-22 PROCEDURE — 99291 CRITICAL CARE FIRST HOUR: CPT | Performed by: INTERNAL MEDICINE

## 2020-07-22 PROCEDURE — 99232 SBSQ HOSP IP/OBS MODERATE 35: CPT | Performed by: INTERNAL MEDICINE

## 2020-07-22 PROCEDURE — 49000 EXPLORATION OF ABDOMEN: CPT | Performed by: SURGERY

## 2020-07-23 ENCOUNTER — CASE MANAGEMENT (OUTPATIENT)
Dept: CARE COORDINATION | Age: 20
End: 2020-07-23

## 2020-07-23 LAB
BASE DEFICIT BLDA-SCNC: ABNORMAL MMOL/L
BASE EXCESS BLDA CALC-SCNC: ABNORMAL MMOL/L
BDY SITE: ABNORMAL
BODY TEMPERATURE: 36.2 DEGREES
CA-I BLD ISE-SCNC: 1.12 MMOL/L (ref 1.15–1.29)
CA-I BLDA-SCNC: 12 % (ref 15–23)
COHGB MFR BLD: 0 %
CONDITION-RC: ABNORMAL
CONDITION: ABNORMAL
HCO3 BLDA-SCNC: ABNORMAL MMOL/L
HGB BLD-MCNC: 8.7 G/DL (ref 12–15.5)
HOROWITZ INDEX BLD+IHG-RTO: ABNORMAL MM[HG]
LACTATE BLDA-MCNC: >14 MMOL/L
METHGB MFR BLD: 4.9 %
OXYHGB MFR BLD: 91.6 % (ref 94–98)
PAO2 / FIO2 RATIO (RPFR): ABNORMAL
PCO2 BLDA: 47 MM HG (ref 32–45)
PH BLDA: <6.96 UNITS (ref 7.35–7.45)
PO2 BLDA: 354 MM HG (ref 83–108)
POTASSIUM BLD-SCNC: 7.5 MMOL/L (ref 3.4–5.1)
SAO2 % BLDA: 97 % (ref 95–99)
SODIUM BLD-SCNC: 138 MMOL/L (ref 135–145)

## 2021-05-06 NOTE — TELEPHONE ENCOUNTER
Routed to CAP to please review. Labs show 2+ candida. Thanks. Implemented All Universal Safety Interventions:  [place] to call system. Call bell, personal items and telephone within reach. Instruct patient to call for assistance. Room bathroom lighting operational. Non-slip footwear when patient is off stretcher. Physically safe environment: no spills, clutter or unnecessary equipment. Stretcher in lowest position, wheels locked, appropriate side rails in place.

## 2022-06-03 NOTE — ED PROVIDER NOTES
Patient Seen in: [facility] AND [facility] Emergency Department    History   Patient presents with: Tachycardia  Dizziness  Dyspnea IVELISSE SOB      HPI    Patient presents to the ED complaining of multiple complaints.   She states she feels lightheaded has a rapid O2 Device None (Room air)       All measures to prevent infection transmission during my interaction with the patient were taken. The patient was already wearing a droplet mask on my arrival to the room.  Personal protective equipment including droplet ma Procedure                               Abnormality         Status                                     ---------                               -----------         ------                                     CBC W/ DIFFERENTIAL[[id number]] caregiver.     Condition upon leaving the department: Stable    Disposition and Plan     Clinical Impression:  SOB (shortness of breath)  (primary encounter diagnosis)  Abdominal pain, generalized  Moderate back pain    Disposition:  Discharge    Follow-up: FAMILY HISTORY:  Mother  Still living? Unknown  Family history of cardiovascular disease, Age at diagnosis: Age Unknown

## 2023-12-06 NOTE — ED INITIAL ASSESSMENT (HPI)
States that she has had a fever associated with abd pain and nausea. No dysuria.  No hematuria stated

## (undated) DEVICE — HEART A: Brand: MEDLINE INDUSTRIES, INC.

## (undated) DEVICE — SUTURE SILK 4-0 SA63H

## (undated) DEVICE — CONNECTOR PRFSN QCK PRM .25IN

## (undated) DEVICE — PRESSURE TUBING: Brand: TRUWAVE

## (undated) DEVICE — SUTURE PROLENE 6-0 C-1

## (undated) DEVICE — 3M™ IOBAN™ 2 ANTIMICROBIAL INCISE DRAPE 6650EZ: Brand: IOBAN™ 2

## (undated) DEVICE — GAUZE SPONGES,12 PLY: Brand: CURITY

## (undated) DEVICE — FEMORAL VENOUS CANNULA: Brand: EDWARDS LIFESCIENCES FEMORAL VENOUS CANNULA

## (undated) DEVICE — BONE WAX W31G

## (undated) DEVICE — SUTURE VICRYL 1-0 J977H

## (undated) DEVICE — Device

## (undated) DEVICE — UNDYED BRAIDED (POLYGLACTIN 910), SYNTHETIC ABSORBABLE SUTURE: Brand: COATED VICRYL

## (undated) DEVICE — PAD PLMM SLVR 12.5X4IN SLVR

## (undated) DEVICE — 12 FOOT DISPOSABLE EXTENSION CABLE WITH SAFE CONNECT / SCREW-DOWN

## (undated) DEVICE — SET XTN .1IN 2.7ML 20IN IV

## (undated) DEVICE — SUTURE PDS II 1 CTX

## (undated) DEVICE — SUTURE VICRYL 2-0 CT-1

## (undated) DEVICE — SUTURE PDS II 2-0 CT-1

## (undated) DEVICE — GAMMEX® PI HYBRID SIZE 8, STERILE POWDER-FREE SURGICAL GLOVE, POLYISOPRENE AND NEOPRENE BLEND: Brand: GAMMEX

## (undated) DEVICE — EZ GLIDE AORTIC CANNULA: Brand: EDWARDS LIFESCIENCES EZ GLIDE AORTIC CANNULA

## (undated) DEVICE — SPONGE LAP 18X18 XRAY STRL

## (undated) DEVICE — LEAD BIPOLAR TEMP 6495XF53

## (undated) DEVICE — PERCUTANEOUS INSERTION KIT-VENOUS: Brand: PERCUTANEOUS INSERTION KIT-VENOUS

## (undated) DEVICE — DRAPE SHEET LG

## (undated) DEVICE — THORACIC CATHETER, STRAIGHT, SILICONE, WITH CLOTSTOP®: Brand: AXIOM® ATRAUM™ WITH CLOTSTOP®

## (undated) DEVICE — THORACIC CATHETER, RIGHT ANGLE, SILICONE, WITH CLOTSTOP®: Brand: AXIOM® ATRAUM™ WITH CLOTSTOP®

## (undated) DEVICE — HEMOCLIP MED 24 CLIP/CARTRIDGE

## (undated) DEVICE — SUCTION CANISTER, 3000CC,SAFELINER: Brand: DEROYAL

## (undated) DEVICE — 12 ML SYRINGE LUER-LOCK TIP: Brand: MONOJECT

## (undated) DEVICE — SUTURE PROLENE 4-0 RB-1

## (undated) DEVICE — VENT LINE PERF

## (undated) DEVICE — 32 FR RIGHT ANGLE – SOFT PVC CATHETER: Brand: PVC THORACIC CATHETERS

## (undated) DEVICE — SUTURE SILK 2-0 SH

## (undated) DEVICE — FLOSEAL HEMOSTATIC MATRIX, 5ML: Brand: FLOSEAL HEMOSTATIC MATRIX

## (undated) DEVICE — SUTURE SILK 2-0 SA85H

## (undated) DEVICE — COVER SGL STRL LGHT HNDL BLU

## (undated) DEVICE — CATH THORACIC 32F 5 EYLT

## (undated) DEVICE — HEART DRAPE AND SUPPLY: Brand: MEDLINE INDUSTRIES, INC.

## (undated) DEVICE — ABSORBABLE HEMOSTAT (OXIDIZED REGENERATED CELLULOSE, U.S.P.): Brand: SURGICEL

## (undated) DEVICE — TRAY ENDOVEIN KTV16 HARVESTING

## (undated) DEVICE — BANDAGE ROLL,100% COTTON, 6 PLY, LARGE: Brand: KERLIX

## (undated) DEVICE — SUTURE PROLENE 5-0 C-1

## (undated) DEVICE — FEM-FLEX II FEMORAL ARTERIAL CANNULA: Brand: FEM-FLEX II FEMORAL ARTERIAL CANNULA

## (undated) DEVICE — DRAPE SLUSH/WARMER W/DISC

## (undated) DEVICE — CONNECTOR PRFSN RDC WYE 3/8IN

## (undated) DEVICE — 32 FR STRAIGHT – SOFT PVC CATHETER: Brand: PVC THORACIC CATHETERS

## (undated) DEVICE — SUTURE PROLENE 5-0 8325H

## (undated) DEVICE — [HIGH FLOW INSUFFLATOR,  DO NOT USE IF PACKAGE IS DAMAGED,  KEEP DRY,  KEEP AWAY FROM SUNLIGHT,  PROTECT FROM HEAT AND RADIOACTIVE SOURCES.]: Brand: PNEUMOSURE

## (undated) DEVICE — INTENDED TO BE USED TO OCCLUDE, RETRACT AND IDENTIFY ARTERIES, VEINS, TENDONS AND NERVES IN SURGICAL PROCEDURES: Brand: STERION®  VESSEL LOOP

## (undated) DEVICE — CLIP SM INTNL HRZN TI LGT LTWT

## (undated) DEVICE — SUTURE WIRE DOUBLE STERNOTOMY

## (undated) DEVICE — DEVICE BLWR/MSTR ACCUMIST ATCH

## (undated) DEVICE — SUTURE SILK 1-0 SA87G

## (undated) DEVICE — FOGARTY ARTERIAL EMBOLECTOMY CATHETER 4F 40CM: Brand: FOGARTY

## (undated) DEVICE — SOLUTION SURG DURA PREP HAZMAT

## (undated) DEVICE — DRAPE SHEET LAPAROTOMY

## (undated) DEVICE — PROXIMATE SKIN STAPLERS (35 WIDE) CONTAINS 35 STAINLESS STEEL STAPLES (FIXED HEAD): Brand: PROXIMATE

## (undated) DEVICE — CATH SECURING DEVICE STATLOCK

## (undated) DEVICE — PACK SRG UNV 1 STRL LF

## (undated) DEVICE — CLIP MED INTNL HMCLP TNTLM

## (undated) DEVICE — 3M™ IOBAN™ 2 ANTIMICROBIAL INCISE DRAPE 6651EZ: Brand: IOBAN™ 2

## (undated) DEVICE — SUTURE MONOCRYL 3-0 Y936H

## (undated) DEVICE — ENCORE® LATEX ACCLAIM SIZE 7.5, STERILE LATEX POWDER-FREE SURGICAL GLOVE: Brand: ENCORE

## (undated) DEVICE — SUTURE SURGICAL STEEL #7

## (undated) DEVICE — ENCORE® LATEX ACCLAIM SIZE 8, STERILE LATEX POWDER-FREE SURGICAL GLOVE: Brand: ENCORE

## (undated) DEVICE — STERNUM BLADE, OFFSET (31.7 X 0.64 X 6.3MM)

## (undated) DEVICE — SOL  .9 1000ML BTL

## (undated) DEVICE — RETROGRADE CARDIOPLEGIA CATHETER: Brand: EDWARDS LIFESCIENCES RETROGRADE CARDIOPLEGIA CATHETER

## (undated) DEVICE — TRAY CATH BDX 16FR 350ML FL

## (undated) DEVICE — LIGACLIP MCA MULTIPLE CLIP APPLIERS, 20 SMALL CLIPS: Brand: LIGACLIP

## (undated) DEVICE — ADAPTER CRDPLG ANTGRD RTRGD 3W

## (undated) DEVICE — ESMARK: Brand: DEROYAL

## (undated) DEVICE — FORESIGHT LARGE SENSOR: Brand: FORESIGHT

## (undated) DEVICE — HEMOCONCENTRATOR NO RINSE 0.3M

## (undated) DEVICE — 3M™ BAIR HUGGER® UNDERBODY BLANKET, FULL ACCESS, 10 PER CASE 63500: Brand: BAIR HUGGER™

## (undated) DEVICE — SUTURE ETHIBOND 0 CT-1

## (undated) DEVICE — SOL  .9 1000ML BAG

## (undated) DEVICE — SUTURE PROLENE 7-0 8701H

## (undated) DEVICE — ASPIRATION/ANTICOAGULATION SET: Brand: HAEMONETICS CELL SAVER SYSTEM

## (undated) DEVICE — VIOLET BRAIDED (POLYGLACTIN 910), SYNTHETIC ABSORBABLE SUTURE: Brand: COATED VICRYL

## (undated) DEVICE — DECANTER SPIKE TRANSFLOW

## (undated) DEVICE — 3 ML SYRINGE LUER-LOCK TIP: Brand: MONOJECT

## (undated) DEVICE — ALARM PT PTCH LVL

## (undated) DEVICE — PACK CUSTOM TUBING

## (undated) DEVICE — MINI-BLADE®: Brand: BEAVER®

## (undated) DEVICE — SUTURE NON ABS 3-0 30INL MONO

## (undated) DEVICE — MINOR GENERAL: Brand: MEDLINE INDUSTRIES, INC.

## (undated) DEVICE — FOGARTY EMBOLECTOMY CATHETER: Brand: FOGARTY

## (undated) DEVICE — PACK ASSRY CUSTOM TUBING

## (undated) DEVICE — CLIP SM INTNL HMCLP TNTLM ESCP

## (undated) DEVICE — PLEDGETT SOFT 1/4 X 12

## (undated) DEVICE — BATTERY

## (undated) DEVICE — CARTRIDGE HC HMS+ CRTDG SYR

## (undated) DEVICE — REM POLYHESIVE ADULT PATIENT RETURN ELECTRODE: Brand: VALLEYLAB

## (undated) DEVICE — CS5/5+ FASTPACK, 225ML 150U RES: Brand: HAEMONETICS CELL SAVER 5/5+ SYSTEMS

## (undated) DEVICE — PUNCH AORTIC 4.0 LONG HANDLE

## (undated) DEVICE — DRAIN CHEST DRY ADULT/PED

## (undated) DEVICE — SUMP INTRCRD SCT ADLT .25IN 12

## (undated) DEVICE — FOGARTY BILIARY BALLOON PROBE 5F 23CM: Brand: FOGARTY

## (undated) DEVICE — STERILE (10.2 X 147CM) TELESCOPICALLY-FOLDED COVER: Brand: CIV-FLEX™ TRANSDUCER COVER

## (undated) NOTE — ED AVS SNAPSHOT
Sonny Goldberg   MRN: [id number]    Department:  [facility] Emergency Department   Date of Visit:  5/23/2019           Disclosure     Insurance plans vary and the physician(s) referred by the ER may not be covered by your plan.  Please contact y CARE PHYSICIAN AT ONCE OR RETURN IMMEDIATELY TO THE EMERGENCY DEPARTMENT. If you have been prescribed any medication(s), please fill your prescription right away and begin taking the medication(s) as directed.   If you believe that any of the medications

## (undated) NOTE — ED AVS SNAPSHOT
[facility] Emergency Department    [place] [address]     [place] [address]    Phone:  [phone]    Fax:  [phone]           Ronny Lorenzo   MRN: [id number]    Department:  [facility] Emergency Department   Date of Visit:  6/22/ Si tiene problemas para programar sangeetha nelli de seguimiento según lo indicado, llame al encargado de varghese al [phone]. It is our goal to assure that you are completely satisfied with every aspect of your visit today.   In an effort to constantly impr list to your next doctor's appointment. Any imaging studies and labs completed today can be reviewed in your MyChart account. You may have had testing done that requires us to contact you. Please make sure we have your correct phone number on file. Sign up for Tracksmitht, your secure online medical record. Systems Integration will allow you to access patient instructions from your recent visit,  view other health information, and more. To sign up or find more information, go to [email or web address]

## (undated) NOTE — ED AVS SNAPSHOT
Sonny Goldberg   MRN: [id number]    Department:  [facility] Emergency Department   Date of Visit:  3/27/2019           Disclosure     Insurance plans vary and the physician(s) referred by the ER may not be covered by your plan.  Please contact y CARE PHYSICIAN AT ONCE OR RETURN IMMEDIATELY TO THE EMERGENCY DEPARTMENT. If you have been prescribed any medication(s), please fill your prescription right away and begin taking the medication(s) as directed.   If you believe that any of the medications

## (undated) NOTE — ED AVS SNAPSHOT
Morgan Meredith   MRN: [id number]    Department:  [facility] Emergency Department   Date of Visit:  10/27/2018           Disclosure     Insurance plans vary and the physician(s) referred by the ER may not be covered by your plan.  Please contact CARE PHYSICIAN AT ONCE OR RETURN IMMEDIATELY TO THE EMERGENCY DEPARTMENT. If you have been prescribed any medication(s), please fill your prescription right away and begin taking the medication(s) as directed.   If you believe that any of the medications

## (undated) NOTE — ED AVS SNAPSHOT
Mindy Wells   MRN: [id number]    Department:  [facility] Emergency Department   Date of Visit:  6/27/2019           Disclosure     Insurance plans vary and the physician(s) referred by the ER may not be covered by your plan.  Please contact y CARE PHYSICIAN AT ONCE OR RETURN IMMEDIATELY TO THE EMERGENCY DEPARTMENT. If you have been prescribed any medication(s), please fill your prescription right away and begin taking the medication(s) as directed.   If you believe that any of the medications

## (undated) NOTE — LETTER
Date & Time: 9/1/2018, 11:50 AM  Patient: Aditi Ackerman  Encounter Provider(s):    Ruben Yi MD       To Whom It May Concern:    Aditi Ackerman was seen and treated in our department on 9/1/2018. She should not return to work until 9/4/18.     I

## (undated) NOTE — LETTER
Date & Time: 3/27/2019, 1:47 PM  Patient: Saadia Arzola  Encounter Provider(s):    SWATHI Mott       To Whom It May Concern:    Saadia Arzola was seen and treated in our department on 3/27/2019. She can return to work 03/28/2019.     If you

## (undated) NOTE — LETTER
[address], [place], [place]     AUTHORIZATION FOR SURGICAL OPERATION OR PROCEDURE    I hereby authorize Dr. Jarred Ballard, my Physician(s) and whomever may be designated as the doctor's Assistant, to perform 4. I consent to the photographing of procedure(s) to be performed for the purposes of advancing medicine, science and/or education, provided my identity is not revealed.  If the procedure has been videotaped, the physician/surgeon will obtain the original v (Witness signature)                                                                                                  (Date)                                (Time)  STATEMENT OF PHYSICIAN My signature below affirms that prior to the time of the procedure;  I

## (undated) NOTE — ED AVS SNAPSHOT
Nainavanessa Melara   MRN: [id number]    Department:  [facility] Emergency Department   Date of Visit:  1/5/2020           Disclosure     Insurance plans vary and the physician(s) referred by the ER may not be covered by your plan.  Please contact yo CARE PHYSICIAN AT ONCE OR RETURN IMMEDIATELY TO THE EMERGENCY DEPARTMENT. If you have been prescribed any medication(s), please fill your prescription right away and begin taking the medication(s) as directed.   If you believe that any of the medications

## (undated) NOTE — LETTER
Date & Time: 1/21/2020, 2:43 PM  Patient: Marcellus Crook  Encounter Provider(s):    INES Goncalves       To Whom It May Concern:    Marcellus Crook was seen and treated in our department on 1/21/2020.  She may return to work 1/22/2020  If you have any

## (undated) NOTE — LETTER
[address], [place], [place]     AUTHORIZATION FOR SURGICAL OPERATION OR PROCEDURE    I hereby authorize Dr Kim Newell MD, my Physician(s) and whomever may be designated as the doctor's Assistant, to perform the followin 4. I consent to the photographing of procedure(s) to be performed for the purposes of advancing medicine, science and/or education, provided my identity is not revealed.  If the procedure has been videotaped, the physician/surgeon will obtain the original v (Witness signature)                                                                                                  (Date)                                (Time)  STATEMENT OF PHYSICIAN My signature below affirms that prior to the time of the procedure;  I

## (undated) NOTE — ED AVS SNAPSHOT
Live Shock   MRN: [id number]    Department:  [facility] Emergency Department   Date of Visit:  12/13/2017           Disclosure     Insurance plans vary and the physician(s) referred by the ER may not be covered by your plan.  Please contact CARE PHYSICIAN AT ONCE OR RETURN IMMEDIATELY TO THE EMERGENCY DEPARTMENT. If you have been prescribed any medication(s), please fill your prescription right away and begin taking the medication(s) as directed.   If you believe that any of the medications

## (undated) NOTE — LETTER
[address], [place], [place]     AUTHORIZATION FOR SURGICAL OPERATION OR PROCEDURE    I hereby authorize Dr. Theresa Farmer MD, my Physician(s) and whomever may be designated as the doctor's Assistant, to perform the followi 4. I consent to the photographing of procedure(s) to be performed for the purposes of advancing medicine, science and/or education, provided my identity is not revealed.  If the procedure has been videotaped, the physician/surgeon will obtain the original v (Witness signature)                                                                                                  (Date)                                (Time)  STATEMENT OF PHYSICIAN My signature below affirms that prior to the time of the procedure;  I

## (undated) NOTE — ED AVS SNAPSHOT
Jackie Marie   MRN: [id number]    Department:  [facility] Emergency Department   Date of Visit:  3/12/2018           Disclosure     Insurance plans vary and the physician(s) referred by the ER may not be covered by your plan.  Please contact y CARE PHYSICIAN AT ONCE OR RETURN IMMEDIATELY TO THE EMERGENCY DEPARTMENT. If you have been prescribed any medication(s), please fill your prescription right away and begin taking the medication(s) as directed.   If you believe that any of the medications

## (undated) NOTE — ED AVS SNAPSHOT
Morgan Meredith   MRN: [id number]    Department:  [facility] Emergency Department   Date of Visit:  3/4/2019           Disclosure     Insurance plans vary and the physician(s) referred by the ER may not be covered by your plan.  Please contact yo CARE PHYSICIAN AT ONCE OR RETURN IMMEDIATELY TO THE EMERGENCY DEPARTMENT. If you have been prescribed any medication(s), please fill your prescription right away and begin taking the medication(s) as directed.   If you believe that any of the medications

## (undated) NOTE — ED AVS SNAPSHOT
Prabhjto Found   MRN: [id number]    Department:  [facility] Emergency Department   Date of Visit:  2/5/2020           Disclosure     Insurance plans vary and the physician(s) referred by the ER may not be covered by your plan.  Please contact yo CARE PHYSICIAN AT ONCE OR RETURN IMMEDIATELY TO THE EMERGENCY DEPARTMENT. If you have been prescribed any medication(s), please fill your prescription right away and begin taking the medication(s) as directed.   If you believe that any of the medications

## (undated) NOTE — ED AVS SNAPSHOT
Corrinne Nasuti   MRN: [id number]    Department:  [facility] Emergency Department   Date of Visit:  11/5/2018           Disclosure     Insurance plans vary and the physician(s) referred by the ER may not be covered by your plan.  Please contact y CARE PHYSICIAN AT ONCE OR RETURN IMMEDIATELY TO THE EMERGENCY DEPARTMENT. If you have been prescribed any medication(s), please fill your prescription right away and begin taking the medication(s) as directed.   If you believe that any of the medications

## (undated) NOTE — LETTER
Date & Time: 11/5/2018, 7:45 PM  Patient: Guerline Moy  Encounter Provider(s):    ZEUS Steele       To Whom It May Concern:    Guerline Moy was seen and treated in our department on 11/5/2018.  She should be excused from work for the next 2

## (undated) NOTE — ED AVS SNAPSHOT
Mac West   MRN: [id number]    Department:  [facility] Emergency Department   Date of Visit:  4/18/2019           Disclosure     Insurance plans vary and the physician(s) referred by the ER may not be covered by your plan.  Please contact y CARE PHYSICIAN AT ONCE OR RETURN IMMEDIATELY TO THE EMERGENCY DEPARTMENT. If you have been prescribed any medication(s), please fill your prescription right away and begin taking the medication(s) as directed.   If you believe that any of the medications

## (undated) NOTE — ED AVS SNAPSHOT
Mindy Wells   MRN: [id number]    Department:  [facility] Emergency Department   Date of Visit:  12/24/2019           Disclosure     Insurance plans vary and the physician(s) referred by the ER may not be covered by your plan.  Please contact CARE PHYSICIAN AT ONCE OR RETURN IMMEDIATELY TO THE EMERGENCY DEPARTMENT. If you have been prescribed any medication(s), please fill your prescription right away and begin taking the medication(s) as directed.   If you believe that any of the medications

## (undated) NOTE — IP AVS SNAPSHOT
[facility]            (For Outpatient Use Only) Initial Admit Date: 6/17/2020   Inpt/Obs Admit Date: Inpt: 6/17/20 / Obs: N/A   Discharge Date:    Berenice Robles:  [de-identified]   MRN: [de-identified]   CSN: [id number]   CEID: [id number]        [facility] Account Financial Class: Medicaid Advantage    June 18, 2020

## (undated) NOTE — LETTER
Serina Buckner [address], [place], [place]  [address]  INFORMED CONSENT FOR TRANSFUSION OF BLOOD OR BLOOD PRODUCTS  My physician has informed me of the nature, purpose, benefits and risks of transfusion for blood and blood components that ______________________________________________  (Signature of Patient)                                                            (Responsible party in case of Minor,

## (undated) NOTE — IP AVS SNAPSHOT
Patient Demographics     Address  [place] [address] Phone  [phone] [facility])  [phone] (Mobile) *Preferred*      Emergency Contact(s)     Name Relation Home Work Mobile    Ehsan Harrington Mother [phone]        Allergies as of 6 [    ]     dextrose 5 % SOLN 250 mL with Norepinephrine Bitartrate 1 MG/ML SOLN 16 mg      Inject 106.6 mcg/min into the vein continuous.    Kelley Nissen, NP   [    ]   [    ]   [    ]   [    ]     Dextrose-NaCl 5-0.9 % Soln      Inject  mL/hr int milrinone lactate in Dextrose 20-5 MG/100ML-% Soln     Please  your prescriptions at the location directed by your doctor or nurse    Bring a paper prescription for each of these medications  dextrose 5 % SOLN 100 mL with Piperacillin Sod-Tazobactam [id number] Magnesium Sulfate in D5W IVPB premix 1 g 06/17/20 2323 New Bag      [id number] Magnesium Sulfate in D5W IVPB premix 1 g 06/18/20 0811 New Bag      [id number] Metoclopramide HCl (REGLAN) injection 5 mg 06/18/20 1025 Given      [id number] Norepinephr [id number] norepinephrine (LEVOPHED) 4 mg/250 ml premix infusion 06/18/20 0025 New Bag      [id number] phenylephrine HCl (SHANNON-SYNEPHRINE) 100 mg in sodium chloride 0.9% 250 mL infusion 06/18/20 1330 New Bag      [id number] potassium chloride IVPB premix 40 m Pulse  (!) 128 Filed at 06/18/2020 1500   Resp  (!) 38 Filed at 06/18/2020 1500   Temp  98.1 °F (36.7 °C) Filed at 06/18/2020 1500   SpO2  99 % Filed at 06/18/2020 1500      Patient's Most Recent Weight       Most Recent Value   Patient Weight  106.6 kg (2 Ordering provider:  Ifrah Abbasi MD  06/18/20 1115 Resulting lab:  [facility])    Specimen Information    Type Source Collected On   Blood — 06/18/20 1119          Components    Component Value Reference Range Flag Lab Venous O2 Saturation 69.0 60.0 - 85.0 % — [facility] ([facility])   Oxygen Delivery Device Vent — — [facility] ([facility])   Blood Gas Vent Mode A/C — — [facility] ([facility])   Blood Gas Respiration Rate 25 BPM — [facility] Total Protein 5.0 6.4 - 8.2 g/dL  [facility])   Albumin 2.4 3.4 - 5.0 g/dL  [facility])   Globulin  2.6 2.8 - 4.4 g/dL  [facility])   A/G Ratio 0.9 1.0 - 2.0  [facility])            MAGNESIUM [[id number]] (Normal)  Resulted: 06/1 Comment:         Only the INR (not the Protime value) should be utilized for   the monitoring of oral anticoagulant therapy.      Recommended therapeutic ranges for anticoagulant therapy are   as follows:   2.0 - 3.0 All indications except for mechanical pr Potassium Blood Gas 3.4 3.3 - 5.1 mmol/L — [facility] ([facility])   Sodium Blood Gas 150 135 - 145 mmol/L H [facility] ([facility])   Lactic Acid (Blood Gas) 4.7 0.5 - 2.2 mmol/L HH [facility] ([facility])   Ionized [place] [facility] ([facility])    Specimen Information    Type Source Collected On   Blood — 06/17/20 3415          Components    Component Value Reference Range Flag Lab   Sample Site Arterial Line — — [facility] ( BASIC METABOLIC PANEL (8) [[id number]] (Abnormal)  Resulted: 06/17/20 2155, Result status: Final result   Ordering provider:  Ifrah Abbasi MD  06/17/20 2109 Resulting lab:  [facility])    Specimen Information    Type [facility] ABG pH 7.17 7.35 - 7.45 LL [facility] ([facility])   ABG pCO2 58 35 - 45 mm Hg H [facility] ([facility])   ABG PO2 106 80 - 100 mm Hg H [facility] (H)   ABG HCO3 19.6 21.0 - 27.0 mEq/L  [facility] Type Source Collected On   Blood — 06/17/20 7388          Components    Component Value Reference Range Flag Lab   PTT 96.8 23.2 - 35.3 seconds H [place] [facility])   Comment:         Elevations of the aPTT in patients not receiving  anticoagulant therapy CBC W/ DIFFERENTIAL[[id number]]          Abnormal            Final result                 Please view results for these tests on the individual orders.     Specimen Information    Type Source Collected On   Blood — 06/17/20 2115            DIC PROFILE [04045 Type Source Collected On   Blood — 06/17/20 0378          Components    Component Value Reference Range Flag Lab   PT 22.6 11.8 - 14.5 seconds H [place] [facility])   Comment:         Elevations of the PT and/or INR in patients not  receiving anticoagulant PTT, ACTIVATED [[id number]] (Abnormal)  Resulted: 06/17/20 1824, Result status: Final result   Ordering provider:  SWATHI Fink  06/17/20 1758 Resulting lab:  [facility])    Specimen Information    Type Source Co ([facility])   Sodium Blood Gas 154 135 - 145 mmol/L H [place] [facility] ([facility])   Lactic Acid (Blood Gas) 16.8 0.5 - 2.2 mmol/L [facility] ([facility])   Ionized Calcium 0.96 0.95 - 1.32 mmol/L — [facility] ([facility])   Total factor deficiency, vitamin K deficiency, liver  disease, etc. Clinical correlation is recommended.        INR >15.50 0.90 - 1.20 [facility])   Comment:         Only the INR (not the Protime value) should be utilized for   the monitoring of oral an Basophil Absolute Manual 0.22 0.00 - 0.20 x10(3) uL H [place] [facility] ([facility])   Metamyelocyte Absolute Manual 0.45 0 x10(3) uL H [place] [facility] ([facility])   Neutrophils % Manual 62 % — [facility] ([facility])   Band % 4 % — [facility] ([facility])   Lymphocyte % Manual 31 % — E Final result   Ordering provider:  John Pelayo NP  06/17/20 1508 Resulting lab:  [place] [facility] ([facility])    Specimen Information    Type Source Collected On   Blood — 06/17/20 3733          Components    Component Value Referen ABG HCO3 15.7 21.0 - 27.0 mEq/L  [place] [facility] ([facility])   Blood Gas Base Excess -11.8 -2.0 - 2.0 mmol/L  [place] ([facility])   Potassium Blood Gas 3.2 3.3 - 5.1 mmol/L  [place] [facility] ([facility])   Sodium Blood Gas 152 13 Microbiology Results (All)     Procedure Component Value Units Date/Time    SARS-CoV-2 by PCR Once [[id number]]  (Normal) Collected:  06/17/20 1510    Order Status:  Completed Lab Status:  Final result Updated:  06/18/20 1522    Specimen:  Other from [facility]  2000 MRN [id number]   Location [address] [place] [address] Attending [address] PCP Jose Alberto Melgar MD, Aliyah Constantino     Date of Admission:  2020  History of Present Illness:   Patient is a 58-year-old  0. 02-0.1 mg/kg/hr, Intravenous, Continuous      (Not in a hospital admission)      Allergies    Sulfa Antibiotics       UNKNOWN    Review of Systems:   Unable to obtain secondary to patient's current clinical condition    Physical Exam:   Blood pressure Tiffanie Blackman Normal.  No significant pulmonary parenchymal abnormalities. No effusion or pleural thickening. BONES: No fracture or visible bony lesion. OTHER: Endotracheal tube is in good position         CONCLUSION:  1. Normal heart and lungs.  2. ET tube in good posi with dobutamine and milrinone. Continue full ventilatory support. Receiving nitric oxide. Further recommendations per cardiology and thoracic surgery.   Discussed with mother.[WH.3]      Electronically signed by Jaycee Simons DO on 6/17/2020  4:04 disease. Unable to obtain 12 point review of systems secondary patient's current clinical condition. Past Medical History  Past Medical History:   Diagnosis Date   • UTI (urinary tract infection)        Past Surgical History  History reviewed.  No perti .0 06/17/2020    CREATSERUM 1.86 (H) 06/17/2020    BUN 10 06/17/2020     06/17/2020    K 2.8 (LL) 06/17/2020     06/17/2020    CO2 17.0 (L) 06/17/2020     (H) 06/17/2020    CA 8.6 06/17/2020    ALB 3.2 (L) 06/17/2020    ALKPHO 42 Patient currently not clinically stable to perform CT chest.  -2D echo with significant right ventricular dilatation seen. [WH.1]  Presumed cardiogenic shock. [WH.2]  -Evaluated by cardiology and cardiothoracic surgery.   Plan for emergent thrombectomy if ev dysuria. Mother states that patient admits to significant dyspnea sudden onset in nature earlier today.   Shortly after arrival to emergency department worsening hypotension, bradycardia noted with brief PEA arrest.  2 rounds of epinephrine administered wi 235 lb (106.6 kg), last menstrual period 02/09/2020, SpO2 (!) 74 %.     Constitutional: Intubated, sedated  HEENT: PERRL  Cardio: RRR, S1 S2  Respiratory: clear to auscultation bilaterally  GI: abdomen soft, non tender  Extremities: no clubbing, cyanosis, e Assessment   1. Status post PEA arrest  2. Shock state  3. Acute hypoxemic respiratory failure  4. Elevated d-dimer  5. Elevated troponin  6. Lactic acidosis  7. Hypokalemia  8. Acute kidney injury  9.   Metabolic acidosis    Plan   -Patient prese She returned 6/17 with acute respiratory distress, hypotensive, bradycardic with PEA arrest.  CPR x 1 hour, intubated & sedated, tPA started for massive PE, & pt taken to OR by Dr Patrick Reid for salvage exploration & thrombectomy.  RP Impella placed for failur Review of Systems:    Constitutional: No fevers, chills, fatigue or night sweats. ENT: No mouth pain, neck pain, running nose, headaches or swollen glands.   Skin: No rashes, pruritus or skin changes,  Respiratory: Denies cough, wheezing or shortness of br CA 7.9 06/18/2020    ALB 2.4 06/18/2020    ALKPHO 56 06/18/2020    BILT 1.0 06/18/2020    TP 5.0 06/18/2020    AST 4,273 06/18/2020     06/18/2020    .6 06/18/2020    INR 1.63 06/18/2020    PTP 19.0 06/18/2020    DDIMER >20.00 06/17/2020 by (CST): Maylin Ennis MD on 6/18/2020 at 7:25 AM     Finalized by (CST): Maylin Ennis MD on 6/18/2020 at 7:29 AM          Xr Chest Ap Portable  (cpt=71045)    Result Date: 6/18/2020  CONCLUSION:  1.  Interval development of multifocal CONCLUSION:  1.  Ultrasound-guided placement of temporary [place] dialysis catheter    Dictated by (CST): Ya Potter MD on 6/18/2020 at 9:35 AM     Finalized by (CST): Ya Potter MD on 6/18/2020 at 9:37 AM              Patient Active Problem List: Reason for Continuing Central Line  Continuous Renal Replacement Therapy (CRRT)        AV Fistula  —        Status  Accessed        CHG Impregnated Disc  Yes        If No, Reason Why?  —        Dressing Status  Clean;Dry; Intact        Site Condition  No co

## (undated) NOTE — ED AVS SNAPSHOT
Ronny Lorenzo   MRN: [id number]    Department:  [facility] Emergency Department   Date of Visit:  1/21/2020           Disclosure     Insurance plans vary and the physician(s) referred by the ER may not be covered by your plan.  Please contact y CARE PHYSICIAN AT ONCE OR RETURN IMMEDIATELY TO THE EMERGENCY DEPARTMENT. If you have been prescribed any medication(s), please fill your prescription right away and begin taking the medication(s) as directed.   If you believe that any of the medications

## (undated) NOTE — ED AVS SNAPSHOT
Bernardo Eden   MRN: [id number]    Department:  [facility] Emergency Department   Date of Visit:  7/31/2018           Disclosure     Insurance plans vary and the physician(s) referred by the ER may not be covered by your plan.  Please contact y CARE PHYSICIAN AT ONCE OR RETURN IMMEDIATELY TO THE EMERGENCY DEPARTMENT. If you have been prescribed any medication(s), please fill your prescription right away and begin taking the medication(s) as directed.   If you believe that any of the medications

## (undated) NOTE — ED AVS SNAPSHOT
Bebe Dancer   MRN: [id number]    Department:  [facility] Emergency Department   Date of Visit:  9/1/2018           Disclosure     Insurance plans vary and the physician(s) referred by the ER may not be covered by your plan.  Please contact yo CARE PHYSICIAN AT ONCE OR RETURN IMMEDIATELY TO THE EMERGENCY DEPARTMENT. If you have been prescribed any medication(s), please fill your prescription right away and begin taking the medication(s) as directed.   If you believe that any of the medications

## (undated) NOTE — ED AVS SNAPSHOT
Mason Del Real   MRN: [id number]    Department:  [facility]   Date of Visit:  6/13/2018           Disclosure     Insurance plans vary and the physician(s) referred by the ER may not be covered by your plan.  Please contact y CARE PHYSICIAN AT ONCE OR RETURN IMMEDIATELY TO THE EMERGENCY DEPARTMENT. If you have been prescribed any medication(s), please fill your prescription right away and begin taking the medication(s) as directed.   If you believe that any of the medications

## (undated) NOTE — LETTER
Date & Time: 3/5/2019, 1:29 AM  Patient: Reema Noyola  Encounter Provider(s):    Chucky Sotelo MD       To Whom It May Concern:    Reema Noyola was seen and treated in our department on 3/4/2019. She should not return to school until 3/6/19.

## (undated) NOTE — ED AVS SNAPSHOT
Sonny Goldberg   MRN: [id number]    Department:  [facility] Emergency Department   Date of Visit:  1/8/2019           Disclosure     Insurance plans vary and the physician(s) referred by the ER may not be covered by your plan.  Please contact yo CARE PHYSICIAN AT ONCE OR RETURN IMMEDIATELY TO THE EMERGENCY DEPARTMENT. If you have been prescribed any medication(s), please fill your prescription right away and begin taking the medication(s) as directed.   If you believe that any of the medications